# Patient Record
Sex: FEMALE | Race: OTHER | Employment: OTHER | URBAN - METROPOLITAN AREA
[De-identification: names, ages, dates, MRNs, and addresses within clinical notes are randomized per-mention and may not be internally consistent; named-entity substitution may affect disease eponyms.]

---

## 2021-06-23 ENCOUNTER — ULTRASOUND ENCOUNTER (OUTPATIENT)
Dept: OBGYN CLINIC | Facility: CLINIC | Age: 41
End: 2021-06-23
Payer: COMMERCIAL

## 2021-06-23 ENCOUNTER — MOBILE ENCOUNTER (OUTPATIENT)
Dept: OBGYN CLINIC | Facility: CLINIC | Age: 41
End: 2021-06-23

## 2021-06-23 ENCOUNTER — OFFICE VISIT (OUTPATIENT)
Dept: OBGYN CLINIC | Facility: CLINIC | Age: 41
End: 2021-06-23
Payer: COMMERCIAL

## 2021-06-23 VITALS
HEIGHT: 61 IN | BODY MASS INDEX: 33.99 KG/M2 | SYSTOLIC BLOOD PRESSURE: 120 MMHG | DIASTOLIC BLOOD PRESSURE: 80 MMHG | WEIGHT: 180 LBS

## 2021-06-23 DIAGNOSIS — O09.519 ADVANCED MATERNAL AGE, PRIMIGRAVIDA, ANTEPARTUM: ICD-10-CM

## 2021-06-23 DIAGNOSIS — Z34.00 PRIMIPAROUS, ANTEPARTUM: ICD-10-CM

## 2021-06-23 DIAGNOSIS — Z32.00 ENCOUNTER FOR CONFIRMATION OF PREGNANCY TEST RESULT WITH PHYSICAL EXAMINATION: ICD-10-CM

## 2021-06-23 DIAGNOSIS — Z32.00 ENCOUNTER FOR CONFIRMATION OF PREGNANCY TEST RESULT WITH PHYSICAL EXAMINATION: Primary | ICD-10-CM

## 2021-06-23 DIAGNOSIS — O99.210 OBESITY IN PREGNANCY: ICD-10-CM

## 2021-06-23 PROBLEM — E78.6 ELEVATED CHOLESTEROL/HIGH DENSITY LIPOPROTEIN RATIO: Status: ACTIVE | Noted: 2018-09-17

## 2021-06-23 PROCEDURE — 3079F DIAST BP 80-89 MM HG: CPT | Performed by: OBSTETRICS & GYNECOLOGY

## 2021-06-23 PROCEDURE — 3008F BODY MASS INDEX DOCD: CPT | Performed by: OBSTETRICS & GYNECOLOGY

## 2021-06-23 PROCEDURE — 99204 OFFICE O/P NEW MOD 45 MIN: CPT | Performed by: OBSTETRICS & GYNECOLOGY

## 2021-06-23 PROCEDURE — 81025 URINE PREGNANCY TEST: CPT | Performed by: OBSTETRICS & GYNECOLOGY

## 2021-06-23 PROCEDURE — 76817 TRANSVAGINAL US OBSTETRIC: CPT | Performed by: OBSTETRICS & GYNECOLOGY

## 2021-06-23 PROCEDURE — 3074F SYST BP LT 130 MM HG: CPT | Performed by: OBSTETRICS & GYNECOLOGY

## 2021-06-23 RX ORDER — PRENATAL VIT/IRON FUM/FOLIC AC 27MG-0.8MG
1 TABLET ORAL DAILY
COMMUNITY
Start: 2021-03-29 | End: 2022-02-04

## 2021-06-23 RX ORDER — FLUNISOLIDE 0.25 MG/ML
2 SOLUTION NASAL 2 TIMES DAILY
COMMUNITY
Start: 2021-03-29 | End: 2022-02-04

## 2021-06-23 RX ORDER — MONTELUKAST SODIUM 10 MG/1
TABLET ORAL
COMMUNITY
Start: 2018-09-25 | End: 2021-07-26

## 2021-06-23 RX ORDER — FLUTICASONE PROPIONATE AND SALMETEROL 250; 50 UG/1; UG/1
1 POWDER RESPIRATORY (INHALATION) DAILY PRN
COMMUNITY
Start: 2020-04-24 | End: 2021-07-26 | Stop reason: ALTCHOICE

## 2021-06-23 RX ORDER — ALBUTEROL SULFATE 90 UG/1
AEROSOL, METERED RESPIRATORY (INHALATION)
COMMUNITY
Start: 2020-04-01

## 2021-06-23 NOTE — PROGRESS NOTES
OFFICE VISIT FOR CONFIRMATION OF PREGNANCY  NEW PT    2021  3:02 PM    CC: Patient is here for confirmation of pregnancy.     HPI: Patient is a 46year old  Patient's last menstrual period was 2021. 7w0d Estimated Date of Delivery: 22 who History    Socioeconomic History      Marital status:       Spouse name: Not on file      Number of children: Not on file      Years of education: Not on file      Highest education level: Not on file    Occupational History      Occupation: [employer] Abused:     Medications reviewed.  See active list.     /80   Ht 61\"   Wt 180 lb (81.6 kg)   LMP 05/05/2021   Breastfeeding No   BMI 34.01 kg/m²     Exam:   GENERAL: well developed, well nourished, in no apparent distress  ABDOMEN: Soft, non distende related risk of trisomies and option of cell free DNA testing. 13.) The option of complete carrier screening for autosomal recessive disease, cystic fibrosis screening, and SMA screening. New OB first trimester folder given to pt. Plan FU in 4 weeks.

## 2021-06-23 NOTE — PROGRESS NOTES
Telephone call:    Patient paged with \"questions on which meds to get\". Called patient twice. Left voicemail that I was calling to get a hold of the patient. Patient did not answer the phone. Left voicemail to page back if has further questions.     D

## 2021-07-09 ENCOUNTER — NURSE ONLY (OUTPATIENT)
Dept: OBGYN CLINIC | Facility: CLINIC | Age: 41
End: 2021-07-09
Payer: COMMERCIAL

## 2021-07-09 ENCOUNTER — LABORATORY ENCOUNTER (OUTPATIENT)
Dept: LAB | Facility: REFERENCE LAB | Age: 41
End: 2021-07-09
Attending: OBSTETRICS & GYNECOLOGY
Payer: COMMERCIAL

## 2021-07-09 DIAGNOSIS — O99.210 OBESITY IN PREGNANCY: ICD-10-CM

## 2021-07-09 DIAGNOSIS — Z34.01 ENCOUNTER FOR SUPERVISION OF NORMAL FIRST PREGNANCY IN FIRST TRIMESTER: Primary | ICD-10-CM

## 2021-07-09 DIAGNOSIS — Z34.01 ENCOUNTER FOR SUPERVISION OF NORMAL FIRST PREGNANCY IN FIRST TRIMESTER: ICD-10-CM

## 2021-07-09 LAB
ANTIBODY SCREEN: NEGATIVE
BASOPHILS # BLD AUTO: 0.05 X10(3) UL (ref 0–0.2)
BASOPHILS NFR BLD AUTO: 0.4 %
DEPRECATED RDW RBC AUTO: 49.6 FL (ref 35.1–46.3)
EOSINOPHIL # BLD AUTO: 0.02 X10(3) UL (ref 0–0.7)
EOSINOPHIL NFR BLD AUTO: 0.2 %
ERYTHROCYTE [DISTWIDTH] IN BLOOD BY AUTOMATED COUNT: 14.6 % (ref 11–15)
GLUCOSE 1H P GLC SERPL-MCNC: 140 MG/DL
HBV SURFACE AG SER-ACNC: <0.1 [IU]/L
HBV SURFACE AG SERPL QL IA: NONREACTIVE
HCT VFR BLD AUTO: 40.7 %
HGB BLD-MCNC: 13.2 G/DL
IMM GRANULOCYTES # BLD AUTO: 0.04 X10(3) UL (ref 0–1)
IMM GRANULOCYTES NFR BLD: 0.4 %
LYMPHOCYTES # BLD AUTO: 1.93 X10(3) UL (ref 1–4)
LYMPHOCYTES NFR BLD AUTO: 17.4 %
MCH RBC QN AUTO: 30 PG (ref 26–34)
MCHC RBC AUTO-ENTMCNC: 32.4 G/DL (ref 31–37)
MCV RBC AUTO: 92.5 FL
MONOCYTES # BLD AUTO: 0.61 X10(3) UL (ref 0.1–1)
MONOCYTES NFR BLD AUTO: 5.5 %
NEUTROPHILS # BLD AUTO: 8.47 X10 (3) UL (ref 1.5–7.7)
NEUTROPHILS # BLD AUTO: 8.47 X10(3) UL (ref 1.5–7.7)
NEUTROPHILS NFR BLD AUTO: 76.1 %
PLATELET # BLD AUTO: 258 10(3)UL (ref 150–450)
RBC # BLD AUTO: 4.4 X10(6)UL
RH BLOOD TYPE: NEGATIVE
RUBV IGG SER QL: POSITIVE
RUBV IGG SER-ACNC: 88.2 IU/ML (ref 10–?)
WBC # BLD AUTO: 11.1 X10(3) UL (ref 4–11)

## 2021-07-09 PROCEDURE — 86777 TOXOPLASMA ANTIBODY: CPT

## 2021-07-09 PROCEDURE — 86762 RUBELLA ANTIBODY: CPT

## 2021-07-09 PROCEDURE — 82950 GLUCOSE TEST: CPT

## 2021-07-09 PROCEDURE — 85025 COMPLETE CBC W/AUTO DIFF WBC: CPT

## 2021-07-09 PROCEDURE — 87389 HIV-1 AG W/HIV-1&-2 AB AG IA: CPT

## 2021-07-09 PROCEDURE — 86780 TREPONEMA PALLIDUM: CPT

## 2021-07-09 PROCEDURE — 86900 BLOOD TYPING SEROLOGIC ABO: CPT

## 2021-07-09 PROCEDURE — 87340 HEPATITIS B SURFACE AG IA: CPT

## 2021-07-09 PROCEDURE — 86778 TOXOPLASMA ANTIBODY IGM: CPT

## 2021-07-09 PROCEDURE — 86901 BLOOD TYPING SEROLOGIC RH(D): CPT

## 2021-07-09 PROCEDURE — 87086 URINE CULTURE/COLONY COUNT: CPT

## 2021-07-09 PROCEDURE — 86850 RBC ANTIBODY SCREEN: CPT

## 2021-07-09 PROCEDURE — 36415 COLL VENOUS BLD VENIPUNCTURE: CPT

## 2021-07-09 NOTE — PROGRESS NOTES
Pt is a   here today for RN [employer].      Pregnancy Confirmation apt with: [address] [facility] on 21  LMP: 21  US: 21  Working CARMEN: 22 based on LMP    Pre  BMI:   34.03  Medical Hx significant for: asthma, anxiety  Obstetrical Hx signific

## 2021-07-10 PROBLEM — R73.09 ELEVATED GLUCOSE TOLERANCE TEST: Status: ACTIVE | Noted: 2021-07-10

## 2021-07-10 PROBLEM — O99.210 OBESITY IN PREGNANCY: Chronic | Status: ACTIVE | Noted: 2021-06-23

## 2021-07-10 PROBLEM — R73.09 ELEVATED GLUCOSE TOLERANCE TEST: Chronic | Status: ACTIVE | Noted: 2021-07-10

## 2021-07-10 PROBLEM — O26.899 RH NEGATIVE STATE IN ANTEPARTUM PERIOD: Chronic | Status: ACTIVE | Noted: 2021-07-10

## 2021-07-10 PROBLEM — Z67.91 RH NEGATIVE STATE IN ANTEPARTUM PERIOD: Chronic | Status: ACTIVE | Noted: 2021-07-10

## 2021-07-10 PROBLEM — Z67.91 RH NEGATIVE STATE IN ANTEPARTUM PERIOD: Status: ACTIVE | Noted: 2021-07-10

## 2021-07-10 PROBLEM — O99.210 OBESITY IN PREGNANCY (HCC): Chronic | Status: ACTIVE | Noted: 2021-06-23

## 2021-07-10 PROBLEM — O26.899 RH NEGATIVE STATE IN ANTEPARTUM PERIOD: Status: ACTIVE | Noted: 2021-07-10

## 2021-07-10 PROBLEM — Z67.91 RH NEGATIVE STATE IN ANTEPARTUM PERIOD (HCC): Status: ACTIVE | Noted: 2021-07-10

## 2021-07-10 PROBLEM — O26.899 RH NEGATIVE STATE IN ANTEPARTUM PERIOD (HCC): Chronic | Status: ACTIVE | Noted: 2021-07-10

## 2021-07-10 PROBLEM — Z67.91 RH NEGATIVE STATE IN ANTEPARTUM PERIOD (HCC): Chronic | Status: ACTIVE | Noted: 2021-07-10

## 2021-07-10 PROBLEM — O26.899 RH NEGATIVE STATE IN ANTEPARTUM PERIOD (HCC): Status: ACTIVE | Noted: 2021-07-10

## 2021-07-12 LAB — T PALLIDUM AB SER QL: NEGATIVE

## 2021-07-12 NOTE — PROGRESS NOTES
This RN called pt; 3 hr GTT instructions sent to pt via [employer] and number for central scheduling provided. Pt verbalized understanding and agrees with POC.

## 2021-07-13 LAB
TOXOPLASMA GONDII AB, IGG: <3 IU/ML
TOXOPLASMA GONDII AB, IGM: <3 AU/ML

## 2021-07-26 ENCOUNTER — OFFICE VISIT (OUTPATIENT)
Dept: FAMILY MEDICINE CLINIC | Facility: CLINIC | Age: 41
End: 2021-07-26
Payer: COMMERCIAL

## 2021-07-26 VITALS
OXYGEN SATURATION: 99 % | SYSTOLIC BLOOD PRESSURE: 118 MMHG | HEART RATE: 101 BPM | WEIGHT: 181 LBS | HEIGHT: 61 IN | DIASTOLIC BLOOD PRESSURE: 76 MMHG | BODY MASS INDEX: 34.17 KG/M2

## 2021-07-26 DIAGNOSIS — Z00.00 ENCOUNTER FOR ROUTINE ADULT HEALTH EXAMINATION WITHOUT ABNORMAL FINDINGS: Primary | ICD-10-CM

## 2021-07-26 DIAGNOSIS — J45.20 MILD INTERMITTENT ASTHMA WITHOUT COMPLICATION: ICD-10-CM

## 2021-07-26 DIAGNOSIS — Z12.4 PAP SMEAR FOR CERVICAL CANCER SCREENING: ICD-10-CM

## 2021-07-26 PROCEDURE — 3078F DIAST BP <80 MM HG: CPT | Performed by: FAMILY MEDICINE

## 2021-07-26 PROCEDURE — 87624 HPV HI-RISK TYP POOLED RSLT: CPT | Performed by: FAMILY MEDICINE

## 2021-07-26 PROCEDURE — 3074F SYST BP LT 130 MM HG: CPT | Performed by: FAMILY MEDICINE

## 2021-07-26 PROCEDURE — 3008F BODY MASS INDEX DOCD: CPT | Performed by: FAMILY MEDICINE

## 2021-07-26 PROCEDURE — 99385 PREV VISIT NEW AGE 18-39: CPT | Performed by: FAMILY MEDICINE

## 2021-07-26 RX ORDER — OMEGA-3 FATTY ACIDS/FISH OIL 300-1000MG
CAPSULE ORAL
COMMUNITY
End: 2022-02-04

## 2021-07-26 RX ORDER — BUDESONIDE 90 UG/1
1 AEROSOL, POWDER RESPIRATORY (INHALATION) 2 TIMES DAILY
Qty: 1 EACH | Refills: 2 | Status: SHIPPED | OUTPATIENT
Start: 2021-07-26 | End: 2022-02-04

## 2021-07-26 RX ORDER — MONTELUKAST SODIUM 10 MG/1
10 TABLET ORAL NIGHTLY
Qty: 90 TABLET | Refills: 2 | Status: SHIPPED | OUTPATIENT
Start: 2021-07-26 | End: 2022-02-04

## 2021-07-26 NOTE — PROGRESS NOTES
HPI:   Josef Clark is a 46year old female who presents for a complete physical exam.     Currently 12 weeks pregnant and has had nausea and vomiting every night but not during the day. Has tried Vitamin B6 and Unisom with some relief, but not always. mouth nightly. 90 tablet 2   • Budesonide (PULMICORT FLEXHALER) 90 MCG/ACT Inhalation Aerosol Powder, Breath Activated Inhale 1 puff into the lungs 2 (two) times daily. 1 each 2   • Prenatal 27-0.8 MG Oral Tab Take 1 tablet by mouth daily.      • Flunisolid 101   Ht 5' 1\" (1.549 m)   Wt 181 lb (82.1 kg)   LMP 05/05/2021   SpO2 99%   BMI 34.20 kg/m²     GENERAL: well developed, well nourished, in no apparent distress   SKIN: no rashes, no suspicious lesions  HEENT: atraumatic, normocephalic  EYES: PERRLA, EOM vaccine  -Need for Tdap once as an adult and Td booster every 10 years  -Need for Zoster vaccine for patients >= 50  -STI screening (GC/Chlamydia/HIV):  Checked and negative   -Hepatitis C screening for all adults between the ages of 25 and 78: Check in the

## 2021-07-27 ENCOUNTER — TELEPHONE (OUTPATIENT)
Dept: OBGYN CLINIC | Facility: CLINIC | Age: 41
End: 2021-07-27

## 2021-07-27 LAB — HPV I/H RISK 1 DNA SPEC QL NAA+PROBE: NEGATIVE

## 2021-07-30 LAB
LAST PAP RESULT: NORMAL
PAP HISTORY (OTHER THAN LAST PAP): NORMAL

## 2021-08-02 ENCOUNTER — PATIENT MESSAGE (OUTPATIENT)
Dept: OBGYN CLINIC | Facility: CLINIC | Age: 41
End: 2021-08-02

## 2021-08-02 ENCOUNTER — LABORATORY ENCOUNTER (OUTPATIENT)
Dept: LAB | Age: 41
End: 2021-08-02
Attending: OBSTETRICS & GYNECOLOGY
Payer: COMMERCIAL

## 2021-08-02 DIAGNOSIS — R73.09 ELEVATED GLUCOSE TOLERANCE TEST: Primary | ICD-10-CM

## 2021-08-02 PROBLEM — O24.119 PRE-EXISTING TYPE 2 DIABETES MELLITUS DURING PREGNANCY, ANTEPARTUM (HCC): Status: ACTIVE | Noted: 2021-08-02

## 2021-08-02 PROBLEM — O24.119 PRE-EXISTING TYPE 2 DIABETES MELLITUS DURING PREGNANCY, ANTEPARTUM: Status: ACTIVE | Noted: 2021-08-02

## 2021-08-02 PROCEDURE — 82952 GTT-ADDED SAMPLES: CPT | Performed by: OBSTETRICS & GYNECOLOGY

## 2021-08-02 PROCEDURE — 36415 COLL VENOUS BLD VENIPUNCTURE: CPT | Performed by: OBSTETRICS & GYNECOLOGY

## 2021-08-02 PROCEDURE — 82951 GLUCOSE TOLERANCE TEST (GTT): CPT | Performed by: OBSTETRICS & GYNECOLOGY

## 2021-08-03 ENCOUNTER — INITIAL PRENATAL (OUTPATIENT)
Dept: OBGYN CLINIC | Facility: CLINIC | Age: 41
End: 2021-08-03
Payer: COMMERCIAL

## 2021-08-03 VITALS
DIASTOLIC BLOOD PRESSURE: 76 MMHG | BODY MASS INDEX: 34.36 KG/M2 | HEIGHT: 61 IN | SYSTOLIC BLOOD PRESSURE: 112 MMHG | WEIGHT: 182 LBS

## 2021-08-03 DIAGNOSIS — Z67.91 RH NEGATIVE STATE IN ANTEPARTUM PERIOD: Chronic | ICD-10-CM

## 2021-08-03 DIAGNOSIS — O24.119 PRE-EXISTING TYPE 2 DIABETES MELLITUS DURING PREGNANCY, ANTEPARTUM: Chronic | ICD-10-CM

## 2021-08-03 DIAGNOSIS — O26.899 RH NEGATIVE STATE IN ANTEPARTUM PERIOD: Chronic | ICD-10-CM

## 2021-08-03 DIAGNOSIS — O09.519 ADVANCED MATERNAL AGE, PRIMIGRAVIDA, ANTEPARTUM: Primary | Chronic | ICD-10-CM

## 2021-08-03 PROCEDURE — 3078F DIAST BP <80 MM HG: CPT | Performed by: OBSTETRICS & GYNECOLOGY

## 2021-08-03 PROCEDURE — 3008F BODY MASS INDEX DOCD: CPT | Performed by: OBSTETRICS & GYNECOLOGY

## 2021-08-03 PROCEDURE — 3074F SYST BP LT 130 MM HG: CPT | Performed by: OBSTETRICS & GYNECOLOGY

## 2021-08-03 PROCEDURE — 99212 OFFICE O/P EST SF 10 MIN: CPT | Performed by: OBSTETRICS & GYNECOLOGY

## 2021-08-06 ENCOUNTER — HOSPITAL ENCOUNTER (OUTPATIENT)
Dept: ENDOCRINOLOGY | Facility: HOSPITAL | Age: 41
Discharge: HOME | End: 2021-08-06
Attending: OBSTETRICS & GYNECOLOGY
Payer: COMMERCIAL

## 2021-08-06 VITALS — BODY MASS INDEX: 34 KG/M2 | WEIGHT: 179.88 LBS

## 2021-08-06 DIAGNOSIS — O24.415 GESTATIONAL DIABETES MELLITUS (GDM) IN SECOND TRIMESTER CONTROLLED ON ORAL HYPOGLYCEMIC DRUG: Primary | ICD-10-CM

## 2021-08-06 NOTE — PROGRESS NOTES
Gumarogolden Tayler  : 3/3/1980 was seen for Gestational Diabetes Counseling: Individual 14 weeks geatation    Date: 2021   Start time: 3:45 End time: 4:45    Obtained usual diet history: nausea has been very strong throughout pregnancy   B: saltines, provided. Recommendations:      1. Follow recommended GDM meal plan. 2. Begin testing fasting glucose and 2 hours after meals   3. Bring glucose log to each MD office visit. 4. Encouraged activity if no restrictions.    5. Encouraged Malick Baker to call

## 2021-08-17 ENCOUNTER — PATIENT MESSAGE (OUTPATIENT)
Dept: OBGYN CLINIC | Facility: CLINIC | Age: 41
End: 2021-08-17

## 2021-08-17 NOTE — TELEPHONE ENCOUNTER
Fletcher Walters,      How is the nausea and vomiting today? If you are still having issues, please call the office for medication to treat nausea and vomiting. As for the iron pills, based upon your labs you are not anemic and do not need to take iron.   A

## 2021-08-18 ENCOUNTER — APPOINTMENT (OUTPATIENT)
Dept: ENDOCRINOLOGY | Facility: HOSPITAL | Age: 41
End: 2021-08-18
Attending: OBSTETRICS & GYNECOLOGY
Payer: COMMERCIAL

## 2021-08-19 ENCOUNTER — HOSPITAL ENCOUNTER (OUTPATIENT)
Dept: ENDOCRINOLOGY | Facility: HOSPITAL | Age: 41
Discharge: HOME | End: 2021-08-19
Attending: OBSTETRICS & GYNECOLOGY
Payer: COMMERCIAL

## 2021-08-19 VITALS — WEIGHT: 179 LBS | BODY MASS INDEX: 34 KG/M2

## 2021-08-19 DIAGNOSIS — O24.410 DIET CONTROLLED GESTATIONAL DIABETES MELLITUS (GDM) IN SECOND TRIMESTER: Primary | ICD-10-CM

## 2021-08-19 NOTE — PROGRESS NOTES
Terrie Alfaro  : 3/3/1980 was seen for GDM Follow-Up Counseling 15 weeks     Date: 2021  Referring Provider: Dr Danny Lord  Start time: 5 pm End time: 5:30 pm    Assessment:  Wt 179 lb   LMP 2021   BMI 33.82 kg/m²   Weight: Wt Readings from and when to call provider. Recommendations:      1. Follow recommended meal plan. 2. Test blood glucose and ketones as directed. 3. Bring glucose log to each MD visit. 4. Start/continue activity if no restrictions.     5. Additional recommendation

## 2021-08-20 ENCOUNTER — PATIENT MESSAGE (OUTPATIENT)
Dept: OBGYN CLINIC | Facility: CLINIC | Age: 41
End: 2021-08-20

## 2021-08-20 NOTE — TELEPHONE ENCOUNTER
From: Chris Olvera  To: Alma Delia Rivera MD  Sent: 8/20/2021 9:08 AM CDT  Subject: Non-Urgent Medical Question    Good morning. I just read the after visit notes from Dr. Kennedy Hernandez.  She has that my gestational diabetes is being controlled by an or

## 2021-08-27 ENCOUNTER — PATIENT MESSAGE (OUTPATIENT)
Dept: OBGYN CLINIC | Facility: CLINIC | Age: 41
End: 2021-08-27

## 2021-08-27 DIAGNOSIS — O24.319 PREGESTATIONAL DIABETES MELLITUS, MODIFIED WHITE CLASS B: Primary | ICD-10-CM

## 2021-08-30 ENCOUNTER — TELEPHONE (OUTPATIENT)
Dept: PERINATAL CARE | Facility: HOSPITAL | Age: 41
End: 2021-08-30

## 2021-08-30 ENCOUNTER — PATIENT MESSAGE (OUTPATIENT)
Dept: OBGYN CLINIC | Facility: CLINIC | Age: 41
End: 2021-08-30

## 2021-08-30 ENCOUNTER — ROUTINE PRENATAL (OUTPATIENT)
Dept: OBGYN CLINIC | Facility: CLINIC | Age: 41
End: 2021-08-30
Payer: COMMERCIAL

## 2021-08-30 VITALS
BODY MASS INDEX: 33.58 KG/M2 | SYSTOLIC BLOOD PRESSURE: 114 MMHG | HEIGHT: 61 IN | DIASTOLIC BLOOD PRESSURE: 70 MMHG | WEIGHT: 177.88 LBS

## 2021-08-30 DIAGNOSIS — O24.119 PRE-EXISTING TYPE 2 DIABETES MELLITUS DURING PREGNANCY, ANTEPARTUM: Primary | Chronic | ICD-10-CM

## 2021-08-30 DIAGNOSIS — O24.119 TYPE 2 DIABETES MELLITUS COMPLICATING PREGNANCY, ANTEPARTUM: Primary | ICD-10-CM

## 2021-08-30 DIAGNOSIS — O09.519 ADVANCED MATERNAL AGE, PRIMIGRAVIDA, ANTEPARTUM: Chronic | ICD-10-CM

## 2021-08-30 LAB
GLUCOSE (URINE DIPSTICK): NEGATIVE MG/DL
MULTISTIX LOT#: NORMAL NUMERIC
PROTEIN (URINE DIPSTICK): NEGATIVE MG/DL

## 2021-08-30 PROCEDURE — 81002 URINALYSIS NONAUTO W/O SCOPE: CPT | Performed by: OBSTETRICS & GYNECOLOGY

## 2021-08-30 PROCEDURE — 99213 OFFICE O/P EST LOW 20 MIN: CPT | Performed by: OBSTETRICS & GYNECOLOGY

## 2021-08-30 PROCEDURE — 3078F DIAST BP <80 MM HG: CPT | Performed by: OBSTETRICS & GYNECOLOGY

## 2021-08-30 PROCEDURE — 3008F BODY MASS INDEX DOCD: CPT | Performed by: OBSTETRICS & GYNECOLOGY

## 2021-08-30 PROCEDURE — 3074F SYST BP LT 130 MM HG: CPT | Performed by: OBSTETRICS & GYNECOLOGY

## 2021-08-30 NOTE — TELEPHONE ENCOUNTER
See other my chart message            From: Kimberly Gamino  To: Zachery Persaud MD  Sent: 8/30/2021  2:10 PM CDT  Subject: Referral Request    Hi.  I need a referral put in for my 20 week ultrasound as they are already booked until the end of S

## 2021-08-30 NOTE — PROGRESS NOTES
Here for WANDA - last visit Dr. Jim Friedman. Just began Metformin 8/27 for elevated FBS - consult with MFM and for Level II USN ordered.

## 2021-08-31 ENCOUNTER — HOSPITAL ENCOUNTER (OUTPATIENT)
Dept: PERINATAL CARE | Facility: HOSPITAL | Age: 41
Discharge: HOME | End: 2021-08-31
Attending: OBSTETRICS & GYNECOLOGY
Payer: COMMERCIAL

## 2021-08-31 ENCOUNTER — TELEPHONE (OUTPATIENT)
Dept: PERINATAL CARE | Facility: HOSPITAL | Age: 41
End: 2021-08-31

## 2021-08-31 VITALS — WEIGHT: 177 LBS | BODY MASS INDEX: 33 KG/M2

## 2021-08-31 DIAGNOSIS — O24.319 PREGESTATIONAL DIABETES MELLITUS, MODIFIED WHITE CLASS B: Primary | ICD-10-CM

## 2021-08-31 DIAGNOSIS — O09.519 ADVANCED MATERNAL AGE, PRIMIGRAVIDA, ANTEPARTUM: Chronic | ICD-10-CM

## 2021-08-31 DIAGNOSIS — O99.210 OBESITY IN PREGNANCY: Chronic | ICD-10-CM

## 2021-08-31 DIAGNOSIS — O24.319 PREGESTATIONAL DIABETES MELLITUS, MODIFIED WHITE CLASS B: ICD-10-CM

## 2021-08-31 PROCEDURE — 99243 OFF/OP CNSLTJ NEW/EST LOW 30: CPT | Performed by: OBSTETRICS & GYNECOLOGY

## 2021-08-31 RX ORDER — PEN NEEDLE, DIABETIC 31 GX5/16"
NEEDLE, DISPOSABLE MISCELLANEOUS
Qty: 100 EACH | Refills: 2 | Status: SHIPPED | OUTPATIENT
Start: 2021-08-31 | End: 2021-11-18

## 2021-08-31 NOTE — PROGRESS NOTES
Reason for Consult:   Dear Dr. Alease Boxer ,    Thank you for requesting a maternal fetal medicine consultation on Stacey Hill. As you are aware she is a 46year old female with a [place] pregnancy at [place] [address].   A maternal-fetal medicine co route 2 (two) times daily.      • Albuterol Sulfate  (90 Base) MCG/ACT Inhalation Aero Soln ALBUTEROL SULFATE  (90 BASE) MCG/ACT AERS          HISTORY:  Past Medical History:   Diagnosis Date   • Anxiety    • Asthma    • Environmental allergie advised for these patients including a comprehensive ultrasound to assess for fetal malformations (at 20 weeks) and a third trimester ultrasound assessment for fetal growth (at 32 weeks).  In addition, weekly NST's (initiating at 36 weeks gestation for wome women does increase the chance that a women will be induced (71 inductions per fetal death averted) and thereby increases her risk of having a  delivery, only 14 additional cesareans would need to be performed to avert one unexplained fetal death. with the patient. First trimester screening and second trimester multiple-marker serum serum screening as alternative aneuploidy screening options were also reviewed.  However, both of these tests are associated with lower detection and higher false positiv postptandial values of less than 140, or 2 hour postprandial values of less than 120.   Blood sugars should be check at least 4 times daily.      ----------------------------    OBESITY:    Obesity during pregnancy is associated with numerous maternal and p risk factors for preeclampsia.              Studies have found that the increased risk of  birth in obese gravidas is primarily associated with obesity-related medical and  complications, rather than an intrinsic predisposition to spontaneou daily    • Delivery for well controlled pre-existing diabetes without other complication 39 weeks  • Delivery for poorly controlled pre-existing diabetes with other complications 22-85 weeks.     Thank you for allowing me to participate in the care of your

## 2021-08-31 NOTE — PROGRESS NOTES
Pt for MFM consult and Diabetes management  Per Dr Frederic Bamberger she would like Pt managed for DM by MFM  My chart  Flow sheet hector initiated Pt states understanding

## 2021-08-31 NOTE — TELEPHONE ENCOUNTER
Informed LC of pt's concern, order given for NPH 10 units nightly, education at the diabetic center at [facility] 150, f/u with MFM 09/15/21. Called diabetic center and informed on placing the order in the referral and done.     Called pt and informed of insulin ED

## 2021-08-31 NOTE — TELEPHONE ENCOUNTER
Pt contacted office RE Diabetic consult  Pt states taking metformin 500 bid since 8/20  Unable to achieve [facility] Under 100  Office contacted RE if MFM is to follow Blood glucose for pt  Pt encouraged to get A1C  Testing done    Pt offered appointment 10 am  8

## 2021-09-02 ENCOUNTER — TELEPHONE (OUTPATIENT)
Dept: OBGYN CLINIC | Facility: CLINIC | Age: 41
End: 2021-09-02

## 2021-09-02 NOTE — TELEPHONE ENCOUNTER
Patient is very disappointed in the care she is receiving at Dr Teressa Kat office and would like to transfer care to our practice.   She requested that they send her medical records over for review but they refused and told her Dr Denton can access them thro

## 2021-09-02 NOTE — TELEPHONE ENCOUNTER
Spoke with pt who is 17w1d and unhappy with care at current practice. She wishes to transfer care to us. She is under 20 weeks so no doc approval needed. We can see all labs from current practice in EPIC.  Pt states she just failed her 3 hour GTT and starte

## 2021-09-03 ENCOUNTER — HOSPITAL ENCOUNTER (OUTPATIENT)
Dept: ENDOCRINOLOGY | Facility: HOSPITAL | Age: 41
Discharge: HOME | End: 2021-09-03
Attending: OBSTETRICS & GYNECOLOGY
Payer: COMMERCIAL

## 2021-09-03 VITALS — BODY MASS INDEX: 33 KG/M2 | WEIGHT: 177 LBS

## 2021-09-03 DIAGNOSIS — O24.414 INSULIN CONTROLLED GESTATIONAL DIABETES MELLITUS (GDM) IN SECOND TRIMESTER: Primary | ICD-10-CM

## 2021-09-03 NOTE — PROGRESS NOTES
Jose Zhang  : 3/3/1980 was seen for Injection Instruction: Individual    Date: 9/3/2021 Start time: 2:30 pm End time: 3:08 pm    Wt 177 lb   LMP 2021   BMI 33.44 kg/m²       Medication type, dose and frequency: Patient and  reviewed p

## 2021-09-07 ENCOUNTER — TELEPHONE (OUTPATIENT)
Dept: PERINATAL CARE | Facility: HOSPITAL | Age: 41
End: 2021-09-07

## 2021-09-07 NOTE — TELEPHONE ENCOUNTER
17w6d  Received BS log for date range 9/1- 9/6     Low  High Out of Range   Fasting Blood Sugar 92 99 4/6   Post Breakfast 72 115 0/6   Post lunch 101 124 1/6   Post Dinner 106 132 2/6     Patient is currently taking        Levemir 10u at HS

## 2021-09-07 NOTE — TELEPHONE ENCOUNTER
Blood Glucose flow sheet  reviewed  through 9/6        Recommendations  Tor 20 Units at     Blood glucose monitoring   With weekly review by AMIRA

## 2021-09-14 ENCOUNTER — TELEPHONE (OUTPATIENT)
Dept: PERINATAL CARE | Facility: HOSPITAL | Age: 41
End: 2021-09-14

## 2021-09-14 NOTE — TELEPHONE ENCOUNTER
18w6d  Received BS log for date range 9/7-9/13     Low  High Out of Range   Fasting Blood Sugar 77 95 0/7   Post Breakfast 94 109 0/7   Post lunch 96 129 3/7 (food choices for 2/3 highs)   Post Dinner 100 122 1/7     Patient is currently taking     Levemir

## 2021-09-14 NOTE — TELEPHONE ENCOUNTER
No change - continue current regimen. Continue Insulin  Levemir 20 units at bedtime     Sharyle Casey. Sabine Carpenter M.D.   Maternal-Fetal Medicine

## 2021-09-20 ENCOUNTER — NURSE ONLY (OUTPATIENT)
Dept: OBGYN CLINIC | Facility: CLINIC | Age: 41
End: 2021-09-20
Payer: COMMERCIAL

## 2021-09-20 VITALS — BODY MASS INDEX: 33 KG/M2 | WEIGHT: 175.38 LBS

## 2021-09-20 DIAGNOSIS — Z34.02 ENCOUNTER FOR SUPERVISION OF NORMAL FIRST PREGNANCY IN SECOND TRIMESTER: Primary | ICD-10-CM

## 2021-09-20 PROBLEM — Z67.91 RH NEGATIVE STATE IN ANTEPARTUM PERIOD (HCC): Chronic | Status: RESOLVED | Noted: 2021-07-10 | Resolved: 2021-09-20

## 2021-09-20 PROBLEM — R73.09 ELEVATED GLUCOSE TOLERANCE TEST: Chronic | Status: RESOLVED | Noted: 2021-07-10 | Resolved: 2021-09-20

## 2021-09-20 PROBLEM — O26.899 RH NEGATIVE STATE IN ANTEPARTUM PERIOD: Chronic | Status: RESOLVED | Noted: 2021-07-10 | Resolved: 2021-09-20

## 2021-09-20 PROBLEM — O26.899 RH NEGATIVE STATE IN ANTEPARTUM PERIOD (HCC): Chronic | Status: RESOLVED | Noted: 2021-07-10 | Resolved: 2021-09-20

## 2021-09-20 PROBLEM — Z67.91 RH NEGATIVE STATE IN ANTEPARTUM PERIOD: Chronic | Status: RESOLVED | Noted: 2021-07-10 | Resolved: 2021-09-20

## 2021-09-20 PROBLEM — O24.119 PRE-EXISTING TYPE 2 DIABETES MELLITUS DURING PREGNANCY, ANTEPARTUM (HCC): Chronic | Status: RESOLVED | Noted: 2021-08-02 | Resolved: 2021-09-20

## 2021-09-20 PROBLEM — O24.119 PRE-EXISTING TYPE 2 DIABETES MELLITUS DURING PREGNANCY, ANTEPARTUM: Chronic | Status: RESOLVED | Noted: 2021-08-02 | Resolved: 2021-09-20

## 2021-09-20 RX ORDER — CHOLECALCIFEROL (VITAMIN D3) 25 MCG
1 TABLET,CHEWABLE ORAL DAILY
COMMUNITY

## 2021-09-20 NOTE — PROGRESS NOTES
Pillo Moreno    Dear Dr. Alberto Crandall    Thank you for requesting an ultrasound and maternal-fetal medicine consultation on your patient Randy Felty.   As you are aware she is a 46year old female  with a singl with the recommended four times daily assessments (fasting and two-hour post-prandial) is adequate. Her overall glucose control is adequate. Her compliance with her insulin regimen was reviewed and it is adequate.    Her current insulin regimen is:  Karina Adorno

## 2021-09-20 NOTE — PROGRESS NOTES
Pt seen for OBN appt today as a transfer at 19w with no complaints. Pt had PN labs done at previous practice and results are in Epic in the labs section. Pt has NPN appt with [facility] on 9/22.      Partner's name is Travis Concha  contact # [phone]; antonella family with:  Patient's age 28 years or older as of estimated date of delivery: Yes   Thalassemia ([facility], [facility], [address] [facility], or  background):  MCV less than 80: No   Neural tube defect (Meningomyelocele, Spina bifida, or Anencephaly): No   Congeni

## 2021-09-21 ENCOUNTER — PATIENT MESSAGE (OUTPATIENT)
Dept: PERINATAL CARE | Facility: HOSPITAL | Age: 41
End: 2021-09-21

## 2021-09-21 NOTE — TELEPHONE ENCOUNTER
Blood Glucose flow sheet  reviewed  through  9/21        Recommendations     Continue  Levemir 20 units at bedtime      Blood glucose monitoring   With weekly review by AMIRA

## 2021-09-22 ENCOUNTER — HOSPITAL ENCOUNTER (OUTPATIENT)
Dept: PERINATAL CARE | Facility: HOSPITAL | Age: 41
Discharge: HOME | End: 2021-09-22
Attending: OBSTETRICS & GYNECOLOGY
Payer: COMMERCIAL

## 2021-09-22 ENCOUNTER — LAB ENCOUNTER (OUTPATIENT)
Dept: LAB | Facility: HOSPITAL | Age: 41
End: 2021-09-22
Attending: OBSTETRICS & GYNECOLOGY
Payer: COMMERCIAL

## 2021-09-22 ENCOUNTER — INITIAL PRENATAL (OUTPATIENT)
Dept: OBGYN CLINIC | Facility: CLINIC | Age: 41
End: 2021-09-22
Payer: COMMERCIAL

## 2021-09-22 ENCOUNTER — TELEPHONE (OUTPATIENT)
Dept: OBGYN CLINIC | Facility: CLINIC | Age: 41
End: 2021-09-22

## 2021-09-22 VITALS
WEIGHT: 175 LBS | DIASTOLIC BLOOD PRESSURE: 68 MMHG | BODY MASS INDEX: 33 KG/M2 | SYSTOLIC BLOOD PRESSURE: 107 MMHG | HEART RATE: 67 BPM

## 2021-09-22 VITALS
BODY MASS INDEX: 33 KG/M2 | WEIGHT: 175 LBS | HEART RATE: 96 BPM | SYSTOLIC BLOOD PRESSURE: 104 MMHG | DIASTOLIC BLOOD PRESSURE: 64 MMHG

## 2021-09-22 DIAGNOSIS — Z34.02 ENCOUNTER FOR SUPERVISION OF NORMAL FIRST PREGNANCY IN SECOND TRIMESTER: ICD-10-CM

## 2021-09-22 DIAGNOSIS — Z34.02 ENCOUNTER FOR SUPERVISION OF NORMAL FIRST PREGNANCY IN SECOND TRIMESTER: Primary | ICD-10-CM

## 2021-09-22 DIAGNOSIS — O99.210 OBESITY IN PREGNANCY: Chronic | ICD-10-CM

## 2021-09-22 DIAGNOSIS — O24.419 GDM, CLASS A2: Primary | ICD-10-CM

## 2021-09-22 DIAGNOSIS — Z36.3 SCREENING, ANTENATAL, FOR MALFORMATION BY ULTRASOUND: ICD-10-CM

## 2021-09-22 DIAGNOSIS — O09.519 ADVANCED MATERNAL AGE, PRIMIGRAVIDA, ANTEPARTUM: Chronic | ICD-10-CM

## 2021-09-22 PROBLEM — O24.319 PREGESTATIONAL DIABETES MELLITUS, MODIFIED WHITE CLASS B: Status: ACTIVE | Noted: 2021-09-22

## 2021-09-22 PROBLEM — O24.319 PREGESTATIONAL DIABETES MELLITUS, MODIFIED WHITE CLASS B (HCC): Status: ACTIVE | Noted: 2021-09-22

## 2021-09-22 LAB
ALT SERPL-CCNC: 20 U/L
APPEARANCE: CLEAR
AST SERPL-CCNC: 11 U/L (ref 15–37)
CREAT UR-SCNC: 74.3 MG/DL
DEPRECATED RDW RBC AUTO: 49.4 FL (ref 35.1–46.3)
ERYTHROCYTE [DISTWIDTH] IN BLOOD BY AUTOMATED COUNT: 14.2 % (ref 11–15)
GLUCOSE (URINE DIPSTICK): NEGATIVE MG/DL
HCT VFR BLD AUTO: 39.3 %
HGB BLD-MCNC: 12.6 G/DL
MCH RBC QN AUTO: 30.4 PG (ref 26–34)
MCHC RBC AUTO-ENTMCNC: 32.1 G/DL (ref 31–37)
MCV RBC AUTO: 94.7 FL
MULTISTIX LOT#: 5077 NUMERIC
NITRITE, URINE: NEGATIVE
PLATELET # BLD AUTO: 254 10(3)UL (ref 150–450)
PROT UR-MCNC: 5.5 MG/DL
PROT/CREAT UR-RTO: 0.07
RBC # BLD AUTO: 4.15 X10(6)UL
URINE-COLOR: YELLOW
WBC # BLD AUTO: 11 X10(3) UL (ref 4–11)

## 2021-09-22 PROCEDURE — 84460 ALANINE AMINO (ALT) (SGPT): CPT | Performed by: OBSTETRICS & GYNECOLOGY

## 2021-09-22 PROCEDURE — 84156 ASSAY OF PROTEIN URINE: CPT

## 2021-09-22 PROCEDURE — 82570 ASSAY OF URINE CREATININE: CPT

## 2021-09-22 PROCEDURE — 99215 OFFICE O/P EST HI 40 MIN: CPT | Performed by: OBSTETRICS & GYNECOLOGY

## 2021-09-22 PROCEDURE — 3074F SYST BP LT 130 MM HG: CPT | Performed by: OBSTETRICS & GYNECOLOGY

## 2021-09-22 PROCEDURE — 81002 URINALYSIS NONAUTO W/O SCOPE: CPT | Performed by: OBSTETRICS & GYNECOLOGY

## 2021-09-22 PROCEDURE — 36415 COLL VENOUS BLD VENIPUNCTURE: CPT

## 2021-09-22 PROCEDURE — 85027 COMPLETE CBC AUTOMATED: CPT

## 2021-09-22 PROCEDURE — 76811 OB US DETAILED SNGL FETUS: CPT | Performed by: OBSTETRICS & GYNECOLOGY

## 2021-09-22 PROCEDURE — 3078F DIAST BP <80 MM HG: CPT | Performed by: OBSTETRICS & GYNECOLOGY

## 2021-09-22 PROCEDURE — 84450 TRANSFERASE (AST) (SGOT): CPT | Performed by: OBSTETRICS & GYNECOLOGY

## 2021-09-22 NOTE — TELEPHONE ENCOUNTER
Patient is in the lab and would like to have her blood work and urine culture done but the labs are not in place yet. Please advise.

## 2021-09-22 NOTE — PROGRESS NOTES
Had MFM US this am- reviewed recs with her- please see prob list, pt on insulin 0+0+0+20L- AMIRA reviews her BS's weekly.   cotest neg 7/2021

## 2021-09-28 ENCOUNTER — TELEPHONE (OUTPATIENT)
Dept: PERINATAL CARE | Facility: HOSPITAL | Age: 41
End: 2021-09-28

## 2021-09-28 NOTE — TELEPHONE ENCOUNTER
Blood Glucose flow sheet  reviewed  through  9/27     Recommendations      Continue  Levemir 20 units at bedtime       Blood glucose monitoring   With weekly review by MFM    My chart message sent

## 2021-09-30 ENCOUNTER — TELEPHONE (OUTPATIENT)
Dept: PERINATAL CARE | Facility: HOSPITAL | Age: 41
End: 2021-09-30

## 2021-10-05 ENCOUNTER — TELEPHONE (OUTPATIENT)
Dept: PERINATAL CARE | Facility: HOSPITAL | Age: 41
End: 2021-10-05

## 2021-10-05 NOTE — TELEPHONE ENCOUNTER
Blood Glucose flow sheet  reviewed  through 10/4      Recommendations    Levemir 20 units at bedtime      Blood glucose monitoring   With weekly review by MFM    My chart message sent

## 2021-10-12 ENCOUNTER — TELEPHONE (OUTPATIENT)
Dept: PERINATAL CARE | Facility: HOSPITAL | Age: 41
End: 2021-10-12

## 2021-10-12 NOTE — TELEPHONE ENCOUNTER
Blood Glucose flow sheet  reviewed  through 10/11      Recommendations     Jewelemir 20 units at bedtime       Blood glucose monitoring   With weekly review by MFM     My chart message sent

## 2021-10-14 ENCOUNTER — LAB ENCOUNTER (OUTPATIENT)
Dept: LAB | Facility: HOSPITAL | Age: 41
End: 2021-10-14
Attending: OBSTETRICS & GYNECOLOGY
Payer: COMMERCIAL

## 2021-10-14 DIAGNOSIS — Z34.02 ENCOUNTER FOR SUPERVISION OF NORMAL FIRST PREGNANCY IN SECOND TRIMESTER: ICD-10-CM

## 2021-10-14 PROCEDURE — 36415 COLL VENOUS BLD VENIPUNCTURE: CPT

## 2021-10-14 PROCEDURE — 83036 HEMOGLOBIN GLYCOSYLATED A1C: CPT

## 2021-10-14 PROCEDURE — 84443 ASSAY THYROID STIM HORMONE: CPT

## 2021-10-19 ENCOUNTER — TELEPHONE (OUTPATIENT)
Dept: PERINATAL CARE | Facility: HOSPITAL | Age: 41
End: 2021-10-19

## 2021-10-19 NOTE — TELEPHONE ENCOUNTER
Blood Glucose flow sheet  reviewed  through 10/18      Recommendations     Levemir 20 units at bedtime       Blood glucose monitoring   With weekly review by MFM     My chart message sent

## 2021-10-21 ENCOUNTER — TELEPHONE (OUTPATIENT)
Dept: OBGYN CLINIC | Facility: CLINIC | Age: 41
End: 2021-10-21

## 2021-10-22 ENCOUNTER — PATIENT MESSAGE (OUTPATIENT)
Dept: OBGYN CLINIC | Facility: CLINIC | Age: 41
End: 2021-10-22

## 2021-10-22 ENCOUNTER — HOSPITAL ENCOUNTER (OUTPATIENT)
Dept: PERINATAL CARE | Facility: HOSPITAL | Age: 41
Discharge: HOME | End: 2021-10-22
Attending: OBSTETRICS & GYNECOLOGY
Payer: COMMERCIAL

## 2021-10-22 ENCOUNTER — LAB ENCOUNTER (OUTPATIENT)
Dept: LAB | Facility: HOSPITAL | Age: 41
End: 2021-10-22
Attending: OBSTETRICS & GYNECOLOGY
Payer: COMMERCIAL

## 2021-10-22 ENCOUNTER — ROUTINE PRENATAL (OUTPATIENT)
Dept: OBGYN CLINIC | Facility: CLINIC | Age: 41
End: 2021-10-22
Payer: COMMERCIAL

## 2021-10-22 VITALS
WEIGHT: 178 LBS | BODY MASS INDEX: 34 KG/M2 | HEART RATE: 88 BPM | DIASTOLIC BLOOD PRESSURE: 67 MMHG | SYSTOLIC BLOOD PRESSURE: 121 MMHG

## 2021-10-22 VITALS
DIASTOLIC BLOOD PRESSURE: 71 MMHG | SYSTOLIC BLOOD PRESSURE: 110 MMHG | BODY MASS INDEX: 34 KG/M2 | HEART RATE: 80 BPM | WEIGHT: 177.38 LBS

## 2021-10-22 DIAGNOSIS — Z34.91 ENCOUNTER FOR SUPERVISION OF NORMAL PREGNANCY IN FIRST TRIMESTER, UNSPECIFIED GRAVIDITY: ICD-10-CM

## 2021-10-22 DIAGNOSIS — O09.519 ADVANCED MATERNAL AGE, PRIMIGRAVIDA, ANTEPARTUM: Primary | Chronic | ICD-10-CM

## 2021-10-22 DIAGNOSIS — O24.119 TYPE 2 DIABETES MELLITUS COMPLICATING PREGNANCY, ANTEPARTUM: ICD-10-CM

## 2021-10-22 DIAGNOSIS — O09.519 ADVANCED MATERNAL AGE, PRIMIGRAVIDA, ANTEPARTUM: Chronic | ICD-10-CM

## 2021-10-22 DIAGNOSIS — Z34.91 ENCOUNTER FOR SUPERVISION OF NORMAL PREGNANCY IN FIRST TRIMESTER, UNSPECIFIED GRAVIDITY: Primary | ICD-10-CM

## 2021-10-22 DIAGNOSIS — O24.319 PREGESTATIONAL DIABETES MELLITUS, MODIFIED WHITE CLASS B: ICD-10-CM

## 2021-10-22 DIAGNOSIS — O99.210 OBESITY IN PREGNANCY: Chronic | ICD-10-CM

## 2021-10-22 PROCEDURE — 93325 DOPPLER ECHO COLOR FLOW MAPG: CPT | Performed by: OBSTETRICS & GYNECOLOGY

## 2021-10-22 PROCEDURE — 93010 ELECTROCARDIOGRAM REPORT: CPT | Performed by: OBSTETRICS & GYNECOLOGY

## 2021-10-22 PROCEDURE — 76825 ECHO EXAM OF FETAL HEART: CPT | Performed by: OBSTETRICS & GYNECOLOGY

## 2021-10-22 PROCEDURE — 99212 OFFICE O/P EST SF 10 MIN: CPT | Performed by: OBSTETRICS & GYNECOLOGY

## 2021-10-22 PROCEDURE — 93005 ELECTROCARDIOGRAM TRACING: CPT

## 2021-10-22 PROCEDURE — 3078F DIAST BP <80 MM HG: CPT | Performed by: OBSTETRICS & GYNECOLOGY

## 2021-10-22 PROCEDURE — 90471 IMMUNIZATION ADMIN: CPT | Performed by: OBSTETRICS & GYNECOLOGY

## 2021-10-22 PROCEDURE — 85027 COMPLETE CBC AUTOMATED: CPT

## 2021-10-22 PROCEDURE — 3074F SYST BP LT 130 MM HG: CPT | Performed by: OBSTETRICS & GYNECOLOGY

## 2021-10-22 PROCEDURE — 36415 COLL VENOUS BLD VENIPUNCTURE: CPT

## 2021-10-22 PROCEDURE — 81002 URINALYSIS NONAUTO W/O SCOPE: CPT | Performed by: OBSTETRICS & GYNECOLOGY

## 2021-10-22 PROCEDURE — 76827 ECHO EXAM OF FETAL HEART: CPT | Performed by: OBSTETRICS & GYNECOLOGY

## 2021-10-22 PROCEDURE — 90686 IIV4 VACC NO PRSV 0.5 ML IM: CPT | Performed by: OBSTETRICS & GYNECOLOGY

## 2021-10-22 NOTE — PROGRESS NOTES
Adam Summers    Dear Dr. De Pink    Thank you for requesting ultrasound evaluation and maternal fetal medicine consultation on your patient Prateek Tinajero.   As you are aware she is a 46year old female  wi There is a right to left shunting across the patent roiana ovale. There is a normal appearance of the aorta and branching pattern of the head vessels. There appears to be a structurally  normal fetal heart and rhythm.  The patient was made aware of the l Recommendation: decrease insulin to 18 levemir. Continued medication use and capillary blood glucose assessments were reviewed as well as recommendations for serial growth ultrasounds and antepartum testing.       IMPRESSION:  · IUP at 24w2d  · Early-o

## 2021-10-22 NOTE — PROGRESS NOTES
Pt given flu vaccine in left deltoid. Pt tolerated well.     Lot 3A7CG  Exp  06-30-22  ndc 10327-768-30

## 2021-10-29 ENCOUNTER — TELEPHONE (OUTPATIENT)
Dept: PERINATAL CARE | Facility: HOSPITAL | Age: 41
End: 2021-10-29

## 2021-10-29 NOTE — TELEPHONE ENCOUNTER
Blood Glucose flow sheet  reviewed  through 10/28     Recommendations     Levemir 20 units at bedtime       Blood glucose monitoring   With weekly review by MFM     My chart message sent

## 2021-11-09 ENCOUNTER — TELEPHONE (OUTPATIENT)
Dept: PERINATAL CARE | Facility: HOSPITAL | Age: 41
End: 2021-11-09

## 2021-11-09 NOTE — TELEPHONE ENCOUNTER
No change - continue current regimen. Continue Insulin  Levemir 18 U QHS    Mariella Manrique. Nat Friedman M.D.   Maternal-Fetal Medicine

## 2021-11-09 NOTE — TELEPHONE ENCOUNTER
26w6d  Received BS log for date range 11/1-11/8     Low  High Out of Range   Fasting Blood Sugar 69 92 0/8   Post Breakfast 100 114 0/8   Post Lunch 77 118 0/8   Post Dinner 97 116 0/8     Patient is currently taking     Levemir 20 units at bedtime  Rx   P

## 2021-11-11 NOTE — PROGRESS NOTES
Lily Navarrete  Dear Dr. Fernando Erazo     Thank you for requesting an ultrasound and maternal-fetal medicine consultation on your patient Reynaldo Lund.   As you are aware she is a 46year old female  with a mejia pregnan compliance is adequate. Her capillary blood glucose records were reviewed today; her compliance with the recommended four times daily assessments (fasting and two-hour post-prandial) is adequate.   Her overall glucose control is adequate.     Her complianc

## 2021-11-16 ENCOUNTER — ROUTINE PRENATAL (OUTPATIENT)
Dept: OBGYN CLINIC | Facility: CLINIC | Age: 41
End: 2021-11-16
Payer: COMMERCIAL

## 2021-11-16 ENCOUNTER — LAB ENCOUNTER (OUTPATIENT)
Dept: LAB | Facility: HOSPITAL | Age: 41
End: 2021-11-16
Attending: OBSTETRICS & GYNECOLOGY
Payer: COMMERCIAL

## 2021-11-16 ENCOUNTER — TELEPHONE (OUTPATIENT)
Dept: OBGYN CLINIC | Facility: CLINIC | Age: 41
End: 2021-11-16

## 2021-11-16 VITALS
DIASTOLIC BLOOD PRESSURE: 74 MMHG | WEIGHT: 179.81 LBS | SYSTOLIC BLOOD PRESSURE: 115 MMHG | HEART RATE: 85 BPM | BODY MASS INDEX: 34 KG/M2

## 2021-11-16 DIAGNOSIS — Z34.02 ENCOUNTER FOR SUPERVISION OF NORMAL FIRST PREGNANCY IN SECOND TRIMESTER: Primary | ICD-10-CM

## 2021-11-16 PROCEDURE — 90715 TDAP VACCINE 7 YRS/> IM: CPT | Performed by: OBSTETRICS & GYNECOLOGY

## 2021-11-16 PROCEDURE — 81002 URINALYSIS NONAUTO W/O SCOPE: CPT | Performed by: OBSTETRICS & GYNECOLOGY

## 2021-11-16 PROCEDURE — 86901 BLOOD TYPING SEROLOGIC RH(D): CPT | Performed by: OBSTETRICS & GYNECOLOGY

## 2021-11-16 PROCEDURE — 3078F DIAST BP <80 MM HG: CPT | Performed by: OBSTETRICS & GYNECOLOGY

## 2021-11-16 PROCEDURE — 3074F SYST BP LT 130 MM HG: CPT | Performed by: OBSTETRICS & GYNECOLOGY

## 2021-11-16 PROCEDURE — 86900 BLOOD TYPING SEROLOGIC ABO: CPT | Performed by: OBSTETRICS & GYNECOLOGY

## 2021-11-16 PROCEDURE — 86850 RBC ANTIBODY SCREEN: CPT | Performed by: OBSTETRICS & GYNECOLOGY

## 2021-11-16 PROCEDURE — 90471 IMMUNIZATION ADMIN: CPT | Performed by: OBSTETRICS & GYNECOLOGY

## 2021-11-16 PROCEDURE — 36415 COLL VENOUS BLD VENIPUNCTURE: CPT | Performed by: OBSTETRICS & GYNECOLOGY

## 2021-11-16 NOTE — TELEPHONE ENCOUNTER
Pt needs rhogam on Thursday. She has MFM appt that day and can come before or after for rhogam.  She was seen today at 27w6d and didn't have her blood type repeated yet.

## 2021-11-16 NOTE — PROGRESS NOTES
No issues. BS being managed by [facility]. On 18L at bedtime. Tdap today. Needs to do blood type for rhogam.  Has [facility] Thursday. RTC 2 wks.

## 2021-11-18 ENCOUNTER — NURSE ONLY (OUTPATIENT)
Dept: OBGYN CLINIC | Facility: CLINIC | Age: 41
End: 2021-11-18
Payer: COMMERCIAL

## 2021-11-18 ENCOUNTER — TELEPHONE (OUTPATIENT)
Dept: PERINATAL CARE | Facility: HOSPITAL | Age: 41
End: 2021-11-18

## 2021-11-18 ENCOUNTER — HOSPITAL ENCOUNTER (OUTPATIENT)
Dept: PERINATAL CARE | Facility: HOSPITAL | Age: 41
Discharge: HOME | End: 2021-11-18
Attending: OBSTETRICS & GYNECOLOGY
Payer: COMMERCIAL

## 2021-11-18 VITALS
HEART RATE: 89 BPM | WEIGHT: 179 LBS | DIASTOLIC BLOOD PRESSURE: 71 MMHG | SYSTOLIC BLOOD PRESSURE: 133 MMHG | BODY MASS INDEX: 34 KG/M2

## 2021-11-18 VITALS — DIASTOLIC BLOOD PRESSURE: 79 MMHG | HEART RATE: 83 BPM | SYSTOLIC BLOOD PRESSURE: 114 MMHG

## 2021-11-18 DIAGNOSIS — O24.319 PREGESTATIONAL DIABETES MELLITUS, MODIFIED WHITE CLASS B: ICD-10-CM

## 2021-11-18 DIAGNOSIS — O09.519 ADVANCED MATERNAL AGE, PRIMIGRAVIDA, ANTEPARTUM: Chronic | ICD-10-CM

## 2021-11-18 DIAGNOSIS — O24.415 GESTATIONAL DIABETES MELLITUS (GDM) IN SECOND TRIMESTER CONTROLLED ON ORAL HYPOGLYCEMIC DRUG: ICD-10-CM

## 2021-11-18 DIAGNOSIS — E78.6 ELEVATED CHOLESTEROL/HIGH DENSITY LIPOPROTEIN RATIO: ICD-10-CM

## 2021-11-18 DIAGNOSIS — Z67.91 RH NEGATIVE STATE IN ANTEPARTUM PERIOD, THIRD TRIMESTER: Primary | ICD-10-CM

## 2021-11-18 DIAGNOSIS — E11.9 TYPE 2 DIABETES MELLITUS WITHOUT COMPLICATION, WITHOUT LONG-TERM CURRENT USE OF INSULIN (HCC): ICD-10-CM

## 2021-11-18 DIAGNOSIS — O26.893 RH NEGATIVE STATE IN ANTEPARTUM PERIOD, THIRD TRIMESTER: Primary | ICD-10-CM

## 2021-11-18 DIAGNOSIS — O99.210 OBESITY IN PREGNANCY: Primary | Chronic | ICD-10-CM

## 2021-11-18 DIAGNOSIS — O99.210 OBESITY IN PREGNANCY: Chronic | ICD-10-CM

## 2021-11-18 PROCEDURE — 76816 OB US FOLLOW-UP PER FETUS: CPT | Performed by: OBSTETRICS & GYNECOLOGY

## 2021-11-18 PROCEDURE — 3078F DIAST BP <80 MM HG: CPT | Performed by: OBSTETRICS & GYNECOLOGY

## 2021-11-18 PROCEDURE — 3074F SYST BP LT 130 MM HG: CPT | Performed by: OBSTETRICS & GYNECOLOGY

## 2021-11-18 PROCEDURE — 99214 OFFICE O/P EST MOD 30 MIN: CPT | Performed by: OBSTETRICS & GYNECOLOGY

## 2021-11-18 PROCEDURE — 96372 THER/PROPH/DIAG INJ SC/IM: CPT | Performed by: OBSTETRICS & GYNECOLOGY

## 2021-11-18 RX ORDER — BLOOD SUGAR DIAGNOSTIC
1 STRIP MISCELLANEOUS 4 TIMES DAILY
Qty: 150 STRIP | Refills: 3 | Status: SHIPPED | OUTPATIENT
Start: 2021-11-18 | End: 2022-02-04

## 2021-11-18 RX ORDER — PEN NEEDLE, DIABETIC 31 GX5/16"
NEEDLE, DISPOSABLE MISCELLANEOUS
Qty: 100 EACH | Refills: 2 | Status: SHIPPED | OUTPATIENT
Start: 2021-11-18

## 2021-11-18 RX ORDER — PEN NEEDLE, DIABETIC 30 GX3/16"
1 NEEDLE, DISPOSABLE MISCELLANEOUS AS DIRECTED
Qty: 1 EACH | Refills: 3 | Status: SHIPPED | OUTPATIENT
Start: 2021-11-18 | End: 2021-11-18 | Stop reason: CLARIF

## 2021-11-18 NOTE — PROGRESS NOTES
Prenatal patient in today for Routine Rhogam injection. Patient is 28w1d today. Patient without complaints. Injection given left UPPER QUAD. GLUTEUS . Patient tolerated well. Rhogam information sheet provided.  Patient instructed to contact office with any

## 2021-11-18 NOTE — TELEPHONE ENCOUNTER
Blood Glucose flow sheet  reviewed  through 11/17        Recommendations   Continue Levemir 18 U QHS    Blood glucose monitoring   With weekly review by AMIRA

## 2021-11-18 NOTE — TELEPHONE ENCOUNTER
Blood Glucose flow sheet  reviewed  through 11/17        Recommendations     Levemir 18 U QHS    Blood glucose monitoring   With weekly review by MFM  My chart message sent

## 2021-11-23 ENCOUNTER — PATIENT MESSAGE (OUTPATIENT)
Dept: OBGYN CLINIC | Facility: CLINIC | Age: 41
End: 2021-11-23

## 2021-11-24 ENCOUNTER — TELEPHONE (OUTPATIENT)
Dept: PERINATAL CARE | Facility: HOSPITAL | Age: 41
End: 2021-11-24

## 2021-11-24 NOTE — TELEPHONE ENCOUNTER
From: Jimy Hook  To: Florencio Owusu DO  Sent: 11/23/2021 10:41 PM CST  Subject: Blood Glucose Record    Dr. Desmond Nicole requested that I submit weekly records of my blood glucose.

## 2021-11-24 NOTE — TELEPHONE ENCOUNTER
Blood Glucose flow sheet  reviewed  through 11/23/21      Recommendations    Continue  Levemir 18 units at bedtime    Blood glucose monitoring   With weekly review by AMIRA

## 2021-12-01 ENCOUNTER — TELEPHONE (OUTPATIENT)
Dept: OBGYN CLINIC | Facility: CLINIC | Age: 41
End: 2021-12-01

## 2021-12-01 ENCOUNTER — ROUTINE PRENATAL (OUTPATIENT)
Dept: OBGYN CLINIC | Facility: CLINIC | Age: 41
End: 2021-12-01
Payer: COMMERCIAL

## 2021-12-01 ENCOUNTER — TELEPHONE (OUTPATIENT)
Dept: PERINATAL CARE | Facility: HOSPITAL | Age: 41
End: 2021-12-01

## 2021-12-01 VITALS
HEART RATE: 89 BPM | BODY MASS INDEX: 34 KG/M2 | WEIGHT: 178 LBS | DIASTOLIC BLOOD PRESSURE: 74 MMHG | SYSTOLIC BLOOD PRESSURE: 116 MMHG

## 2021-12-01 DIAGNOSIS — Z34.03 ENCOUNTER FOR SUPERVISION OF NORMAL FIRST PREGNANCY IN THIRD TRIMESTER: Primary | ICD-10-CM

## 2021-12-01 PROBLEM — O99.210 OBESITY IN PREGNANCY: Chronic | Status: RESOLVED | Noted: 2021-06-23 | Resolved: 2021-12-01

## 2021-12-01 PROBLEM — O09.519 ADVANCED MATERNAL AGE, PRIMIGRAVIDA, ANTEPARTUM (HCC): Chronic | Status: RESOLVED | Noted: 2021-06-23 | Resolved: 2021-12-01

## 2021-12-01 PROBLEM — E78.6 ELEVATED CHOLESTEROL/HIGH DENSITY LIPOPROTEIN RATIO: Status: RESOLVED | Noted: 2018-09-17 | Resolved: 2021-12-01

## 2021-12-01 PROBLEM — O09.519 ADVANCED MATERNAL AGE, PRIMIGRAVIDA, ANTEPARTUM: Chronic | Status: RESOLVED | Noted: 2021-06-23 | Resolved: 2021-12-01

## 2021-12-01 PROBLEM — O99.210 OBESITY IN PREGNANCY (HCC): Chronic | Status: RESOLVED | Noted: 2021-06-23 | Resolved: 2021-12-01

## 2021-12-01 PROCEDURE — 3078F DIAST BP <80 MM HG: CPT | Performed by: OBSTETRICS & GYNECOLOGY

## 2021-12-01 PROCEDURE — 81002 URINALYSIS NONAUTO W/O SCOPE: CPT | Performed by: OBSTETRICS & GYNECOLOGY

## 2021-12-01 PROCEDURE — 3074F SYST BP LT 130 MM HG: CPT | Performed by: OBSTETRICS & GYNECOLOGY

## 2021-12-01 NOTE — TELEPHONE ENCOUNTER
FMLA forms for spouse received in forms department, logged for processing, incomplete HIPAA/FCR attached with forms. Sent pt a [employer] message for missing HIPAA.

## 2021-12-01 NOTE — TELEPHONE ENCOUNTER
Blood Glucose flow sheet  reviewed  through 11/30/21         Recommendations    Continue Levemir 18 units at bedtime    Blood glucose monitoring   With weekly review by AMIRA

## 2021-12-01 NOTE — TELEPHONE ENCOUNTER
Patient dropped off [place] paperwork for her . Patient will  paperwork when ready. AILEEN received  Payment collected    Thank you.

## 2021-12-07 ENCOUNTER — PATIENT MESSAGE (OUTPATIENT)
Dept: OBGYN CLINIC | Facility: CLINIC | Age: 41
End: 2021-12-07

## 2021-12-07 NOTE — TELEPHONE ENCOUNTER
Returned pt's call, pt checking on status of FMLA forms, informed pt of turnaround time, pt verbalized understanding. Pt would like to  completed forms, please notify pt when forms are ready for  via phone call or MyChart.

## 2021-12-08 ENCOUNTER — TELEPHONE (OUTPATIENT)
Dept: PERINATAL CARE | Facility: HOSPITAL | Age: 41
End: 2021-12-08

## 2021-12-08 NOTE — TELEPHONE ENCOUNTER
Blood Glucose flow sheet  reviewed  through 12/7        Recommendations:    Continue Levemir 18 units at bedtime    Blood glucose monitoring   With weekly review by AMIRA

## 2021-12-08 NOTE — TELEPHONE ENCOUNTER
From: Yaron Salgado  To: Jer Hanna MD  Sent: 12/7/2021 11:02 PM CST  Subject: Weekly Glucose Readings     Hi. Submitting my weekly glucose readings for the week of December 1st through December 7th.

## 2021-12-13 ENCOUNTER — ROUTINE PRENATAL (OUTPATIENT)
Dept: OBGYN CLINIC | Facility: CLINIC | Age: 41
End: 2021-12-13
Payer: COMMERCIAL

## 2021-12-13 VITALS
HEART RATE: 80 BPM | DIASTOLIC BLOOD PRESSURE: 65 MMHG | SYSTOLIC BLOOD PRESSURE: 96 MMHG | BODY MASS INDEX: 34 KG/M2 | WEIGHT: 177.38 LBS

## 2021-12-13 DIAGNOSIS — Z34.91 ENCOUNTER FOR SUPERVISION OF NORMAL PREGNANCY IN FIRST TRIMESTER, UNSPECIFIED GRAVIDITY: Primary | ICD-10-CM

## 2021-12-13 DIAGNOSIS — O24.319 PREGESTATIONAL DIABETES MELLITUS, MODIFIED WHITE CLASS B: ICD-10-CM

## 2021-12-13 PROCEDURE — 3078F DIAST BP <80 MM HG: CPT | Performed by: OBSTETRICS & GYNECOLOGY

## 2021-12-13 PROCEDURE — 3074F SYST BP LT 130 MM HG: CPT | Performed by: OBSTETRICS & GYNECOLOGY

## 2021-12-13 PROCEDURE — 81002 URINALYSIS NONAUTO W/O SCOPE: CPT | Performed by: OBSTETRICS & GYNECOLOGY

## 2021-12-13 NOTE — PROGRESS NOTES
Here with . MFM manages BS.  0+0+0+18 Albertine Favor. Got covid booster this month. Order for NST.   RTC 2 wk

## 2021-12-14 ENCOUNTER — PATIENT MESSAGE (OUTPATIENT)
Dept: OBGYN CLINIC | Facility: CLINIC | Age: 41
End: 2021-12-14

## 2021-12-15 ENCOUNTER — TELEPHONE (OUTPATIENT)
Dept: PERINATAL CARE | Facility: HOSPITAL | Age: 41
End: 2021-12-15

## 2021-12-15 NOTE — TELEPHONE ENCOUNTER
Blood Glucose flow sheet  reviewed  through 12/14 21        Recommendations:     Continue ADA diet    Blood glucose monitoring   With weekly review by MFM  My chart message sent

## 2021-12-17 ENCOUNTER — HOSPITAL ENCOUNTER (OUTPATIENT)
Dept: PERINATAL CARE | Facility: HOSPITAL | Age: 41
Discharge: HOME | End: 2021-12-17
Attending: OBSTETRICS & GYNECOLOGY
Payer: COMMERCIAL

## 2021-12-17 VITALS
DIASTOLIC BLOOD PRESSURE: 71 MMHG | BODY MASS INDEX: 33 KG/M2 | HEART RATE: 86 BPM | WEIGHT: 177 LBS | SYSTOLIC BLOOD PRESSURE: 134 MMHG

## 2021-12-17 DIAGNOSIS — O09.513 AMA (ADVANCED MATERNAL AGE) PRIMIGRAVIDA 35+, THIRD TRIMESTER: ICD-10-CM

## 2021-12-17 DIAGNOSIS — O24.319 PREGESTATIONAL DIABETES MELLITUS, MODIFIED WHITE CLASS B: Primary | ICD-10-CM

## 2021-12-17 DIAGNOSIS — O24.319 PREGESTATIONAL DIABETES MELLITUS, MODIFIED WHITE CLASS B: ICD-10-CM

## 2021-12-17 PROCEDURE — 76816 OB US FOLLOW-UP PER FETUS: CPT | Performed by: OBSTETRICS & GYNECOLOGY

## 2021-12-17 PROCEDURE — 99212 OFFICE O/P EST SF 10 MIN: CPT | Performed by: OBSTETRICS & GYNECOLOGY

## 2021-12-17 PROCEDURE — 76819 FETAL BIOPHYS PROFIL W/O NST: CPT | Performed by: OBSTETRICS & GYNECOLOGY

## 2021-12-17 NOTE — PROGRESS NOTES
Raul Gomez    Dear Dr. Christen De Santiago    Thank you for requesting ultrasound evaluation and maternal fetal medicine consultation on your patient Tejal Malave.   As you are aware she is a 46year old female  with a singleto Ultrasound Report  I interpreted the results and reviewed them with the patient.     DISCUSSION  During her visit we discussed and reviewed the following issues:  See prior M note form 8/31/21 for a detailed review.     GESTATIONAL DIABETES - follow-up  B complication [id number]    Thank you for allowing me to participate in the care of your patient. Please do not hesitate to contact me if additional questions or concerns arise.       Sally Adams MD, M.D.     20 minutes spent in review of records, documentat

## 2021-12-21 ENCOUNTER — HOSPITAL ENCOUNTER (OUTPATIENT)
Dept: PERINATAL CARE | Facility: HOSPITAL | Age: 41
Discharge: HOME | End: 2021-12-21
Attending: OBSTETRICS & GYNECOLOGY
Payer: COMMERCIAL

## 2021-12-21 DIAGNOSIS — O24.319 PREGESTATIONAL DIABETES MELLITUS, MODIFIED WHITE CLASS B: Primary | ICD-10-CM

## 2021-12-21 PROCEDURE — 59025 FETAL NON-STRESS TEST: CPT | Performed by: OBSTETRICS & GYNECOLOGY

## 2021-12-21 PROCEDURE — 59025 FETAL NON-STRESS TEST: CPT

## 2021-12-22 ENCOUNTER — TELEPHONE (OUTPATIENT)
Dept: PERINATAL CARE | Facility: HOSPITAL | Age: 41
End: 2021-12-22

## 2021-12-22 NOTE — TELEPHONE ENCOUNTER
Blood Glucose flow sheet  reviewed  through 12/21/21        Recommendations:  Continue ADA diet  Levemir 18 units QHS    Blood glucose monitoring   With weekly review by AMIRA

## 2021-12-22 NOTE — TELEPHONE ENCOUNTER
Blood Glucose flow sheet  reviewed  through 12/21/21        Recommendations:    Continue ADA diet    Blood glucose monitoring   With weekly review by AMIRA

## 2021-12-23 ENCOUNTER — NST DOCUMENTATION (OUTPATIENT)
Dept: OBGYN CLINIC | Facility: CLINIC | Age: 41
End: 2021-12-23

## 2021-12-23 NOTE — NST
Nonstress Test   Patient: Yumiko Lizama    Gestation: 33w1d    Diagnosis from order: Pregestational diabetes mellitus, modified White class B       NST:         NST DOCUMENTATION 12/21/2021   Variability 6-25 BPM   Accelerations Yes   Decelerations N

## 2021-12-28 ENCOUNTER — HOSPITAL ENCOUNTER (OUTPATIENT)
Dept: PERINATAL CARE | Facility: HOSPITAL | Age: 41
Discharge: HOME | End: 2021-12-28
Attending: OBSTETRICS & GYNECOLOGY
Payer: COMMERCIAL

## 2021-12-28 ENCOUNTER — TELEPHONE (OUTPATIENT)
Dept: PERINATAL CARE | Facility: HOSPITAL | Age: 41
End: 2021-12-28

## 2021-12-28 ENCOUNTER — ROUTINE PRENATAL (OUTPATIENT)
Dept: OBGYN CLINIC | Facility: CLINIC | Age: 41
End: 2021-12-28

## 2021-12-28 VITALS
SYSTOLIC BLOOD PRESSURE: 107 MMHG | BODY MASS INDEX: 34 KG/M2 | HEART RATE: 97 BPM | WEIGHT: 179 LBS | DIASTOLIC BLOOD PRESSURE: 74 MMHG

## 2021-12-28 DIAGNOSIS — O24.319 PREGESTATIONAL DIABETES MELLITUS, MODIFIED WHITE CLASS B: Primary | ICD-10-CM

## 2021-12-28 DIAGNOSIS — Z34.93 ENCOUNTER FOR SUPERVISION OF NORMAL PREGNANCY IN THIRD TRIMESTER, UNSPECIFIED GRAVIDITY: Primary | ICD-10-CM

## 2021-12-28 PROCEDURE — 59025 FETAL NON-STRESS TEST: CPT

## 2021-12-28 PROCEDURE — 81002 URINALYSIS NONAUTO W/O SCOPE: CPT | Performed by: OBSTETRICS & GYNECOLOGY

## 2021-12-28 PROCEDURE — 3078F DIAST BP <80 MM HG: CPT | Performed by: OBSTETRICS & GYNECOLOGY

## 2021-12-28 PROCEDURE — 3074F SYST BP LT 130 MM HG: CPT | Performed by: OBSTETRICS & GYNECOLOGY

## 2021-12-28 PROCEDURE — 59025 FETAL NON-STRESS TEST: CPT | Performed by: OBSTETRICS & GYNECOLOGY

## 2021-12-28 NOTE — TELEPHONE ENCOUNTER
Dr. Radha Win,     Please sign off on form:  -Highlight the patient and hit \"Chart\" button. -In Chart Review, w/in the Encounter tab - click 1 time on the Telephone call encounter for 12/1/21Scroll down the telephone encounter.  -Click \"scan on\" blue Hyperlink under \"Media\" heading for FMLA for spouse Dr. Radha Win 12/28/21 w/in the telephone enc.  -Click on Acknowledge button at the bottom right corner and left-click onto image, signature stamp appears and drag signature to Provider signature line. Stamp will turn blue. Close window.      Thank you,    Apryl Browning

## 2021-12-28 NOTE — TELEPHONE ENCOUNTER
Blood Glucose flow sheet  reviewed  through 12/27/21        Recommendations:  Continue ADA diet  Levemir 18 units QHS     Blood glucose monitoring   With weekly review by AMIRA

## 2022-01-04 ENCOUNTER — TELEPHONE (OUTPATIENT)
Dept: PERINATAL CARE | Facility: HOSPITAL | Age: 42
End: 2022-01-04

## 2022-01-04 ENCOUNTER — PATIENT MESSAGE (OUTPATIENT)
Dept: OBGYN CLINIC | Facility: CLINIC | Age: 42
End: 2022-01-04

## 2022-01-04 ENCOUNTER — HOSPITAL ENCOUNTER (OUTPATIENT)
Dept: PERINATAL CARE | Facility: HOSPITAL | Age: 42
Discharge: HOME | End: 2022-01-04
Attending: OBSTETRICS & GYNECOLOGY
Payer: COMMERCIAL

## 2022-01-04 DIAGNOSIS — O24.319 PREGESTATIONAL DIABETES MELLITUS, MODIFIED WHITE CLASS B: Primary | ICD-10-CM

## 2022-01-04 PROCEDURE — 59025 FETAL NON-STRESS TEST: CPT

## 2022-01-04 NOTE — TELEPHONE ENCOUNTER
Blood Glucose flow sheet  reviewed  through 1/3/2022        Recommendations:  Continue ADA diet  Levemir 18 units QHS     Blood glucose monitoring   With weekly review by HONEYM

## 2022-01-04 NOTE — TELEPHONE ENCOUNTER
Patient called for status, form signed, printed copy and placed by OB check in at [facility] for spouse Kassi Card to .

## 2022-01-05 NOTE — TELEPHONE ENCOUNTER
From: Ward Junior  To: Mallika Denton MD  Sent: 1/4/2022 10:11 AM CST  Subject: Signature For Important Documents     Hi. My  and I are waiting for Dr. Patrick North to sign off on his paternity leave paperwork.  We have an appointment today at [facility]

## 2022-01-07 ENCOUNTER — HOSPITAL ENCOUNTER (OUTPATIENT)
Dept: PERINATAL CARE | Facility: HOSPITAL | Age: 42
Discharge: HOME | End: 2022-01-07
Attending: OBSTETRICS & GYNECOLOGY
Payer: COMMERCIAL

## 2022-01-07 VITALS — DIASTOLIC BLOOD PRESSURE: 66 MMHG | SYSTOLIC BLOOD PRESSURE: 107 MMHG | HEART RATE: 88 BPM

## 2022-01-07 DIAGNOSIS — O28.8 NON-REACTIVE NST (NON-STRESS TEST): ICD-10-CM

## 2022-01-07 DIAGNOSIS — O28.8 NON-REACTIVE NST (NON-STRESS TEST): Primary | ICD-10-CM

## 2022-01-07 DIAGNOSIS — O24.319 PREGESTATIONAL DIABETES MELLITUS, MODIFIED WHITE CLASS B: ICD-10-CM

## 2022-01-07 DIAGNOSIS — O24.319 PREGESTATIONAL DIABETES MELLITUS, MODIFIED WHITE CLASS B: Primary | ICD-10-CM

## 2022-01-07 PROCEDURE — 76819 FETAL BIOPHYS PROFIL W/O NST: CPT | Performed by: OBSTETRICS & GYNECOLOGY

## 2022-01-07 PROCEDURE — 76815 OB US LIMITED FETUS(S): CPT | Performed by: OBSTETRICS & GYNECOLOGY

## 2022-01-07 PROCEDURE — 59025 FETAL NON-STRESS TEST: CPT

## 2022-01-07 PROCEDURE — 59025 FETAL NON-STRESS TEST: CPT | Performed by: OBSTETRICS & GYNECOLOGY

## 2022-01-07 NOTE — PROGRESS NOTES
Outpatient Maternal-Fetal Medicine Follow-Up    LMP 05/05/2021       OB ULTRASOUND REPORT   See imaging tab for complete ultrasound report or in PACS    Single IUP in cephalic presentation. Placenta is anterior. A 3 vessel cord is noted.   Cardiac acti

## 2022-01-11 ENCOUNTER — HOSPITAL ENCOUNTER (OUTPATIENT)
Dept: PERINATAL CARE | Facility: HOSPITAL | Age: 42
Discharge: HOME | End: 2022-01-11
Attending: OBSTETRICS & GYNECOLOGY
Payer: COMMERCIAL

## 2022-01-11 ENCOUNTER — ROUTINE PRENATAL (OUTPATIENT)
Dept: OBGYN CLINIC | Facility: CLINIC | Age: 42
End: 2022-01-11
Payer: COMMERCIAL

## 2022-01-11 ENCOUNTER — TELEPHONE (OUTPATIENT)
Dept: PERINATAL CARE | Facility: HOSPITAL | Age: 42
End: 2022-01-11

## 2022-01-11 ENCOUNTER — LAB ENCOUNTER (OUTPATIENT)
Dept: LAB | Facility: HOSPITAL | Age: 42
End: 2022-01-11
Attending: OBSTETRICS & GYNECOLOGY
Payer: COMMERCIAL

## 2022-01-11 VITALS
SYSTOLIC BLOOD PRESSURE: 118 MMHG | WEIGHT: 181.38 LBS | DIASTOLIC BLOOD PRESSURE: 80 MMHG | BODY MASS INDEX: 34 KG/M2 | HEART RATE: 79 BPM

## 2022-01-11 DIAGNOSIS — Z34.91 ENCOUNTER FOR SUPERVISION OF NORMAL PREGNANCY IN FIRST TRIMESTER, UNSPECIFIED GRAVIDITY: Primary | ICD-10-CM

## 2022-01-11 DIAGNOSIS — Z34.91 ENCOUNTER FOR SUPERVISION OF NORMAL PREGNANCY IN FIRST TRIMESTER, UNSPECIFIED GRAVIDITY: ICD-10-CM

## 2022-01-11 DIAGNOSIS — O24.319 PREGESTATIONAL DIABETES MELLITUS, MODIFIED WHITE CLASS B: Primary | ICD-10-CM

## 2022-01-11 LAB
APPEARANCE: CLEAR
BILIRUBIN: NEGATIVE
DEPRECATED RDW RBC AUTO: 51.1 FL (ref 35.1–46.3)
ERYTHROCYTE [DISTWIDTH] IN BLOOD BY AUTOMATED COUNT: 14.9 % (ref 11–15)
GLUCOSE (URINE DIPSTICK): 100 MG/DL
HCT VFR BLD AUTO: 40 %
HGB BLD-MCNC: 12.9 G/DL
KETONES (URINE DIPSTICK): 80 MG/DL
LEUKOCYTES: NEGATIVE
MCH RBC QN AUTO: 30.3 PG (ref 26–34)
MCHC RBC AUTO-ENTMCNC: 32.3 G/DL (ref 31–37)
MCV RBC AUTO: 93.9 FL
MULTISTIX LOT#: 1027 NUMERIC
NITRITE, URINE: NEGATIVE
OCCULT BLOOD: NEGATIVE
PH, URINE: 7 (ref 4.5–8)
PLATELET # BLD AUTO: 199 10(3)UL (ref 150–450)
PROTEIN (URINE DIPSTICK): NEGATIVE MG/DL
RBC # BLD AUTO: 4.26 X10(6)UL
SPECIFIC GRAVITY: 1.02 (ref 1–1.03)
URINE-COLOR: YELLOW
UROBILINOGEN,SEMI-QN: 0.2 MG/DL (ref 0–1.9)
WBC # BLD AUTO: 10.5 X10(3) UL (ref 4–11)

## 2022-01-11 PROCEDURE — 86780 TREPONEMA PALLIDUM: CPT

## 2022-01-11 PROCEDURE — 36415 COLL VENOUS BLD VENIPUNCTURE: CPT

## 2022-01-11 PROCEDURE — 59025 FETAL NON-STRESS TEST: CPT | Performed by: OBSTETRICS & GYNECOLOGY

## 2022-01-11 PROCEDURE — 85027 COMPLETE CBC AUTOMATED: CPT

## 2022-01-11 PROCEDURE — 81002 URINALYSIS NONAUTO W/O SCOPE: CPT | Performed by: OBSTETRICS & GYNECOLOGY

## 2022-01-11 PROCEDURE — 87389 HIV-1 AG W/HIV-1&-2 AB AG IA: CPT

## 2022-01-11 PROCEDURE — 59025 FETAL NON-STRESS TEST: CPT

## 2022-01-11 PROCEDURE — 3074F SYST BP LT 130 MM HG: CPT | Performed by: OBSTETRICS & GYNECOLOGY

## 2022-01-11 PROCEDURE — 3079F DIAST BP 80-89 MM HG: CPT | Performed by: OBSTETRICS & GYNECOLOGY

## 2022-01-11 NOTE — PROGRESS NOTES
Pt just had juice and rik crackers for her NST right before she came in - reassured her about her urine dip results.  Labs ordered, had BPP last Friday and growth US this coming friday

## 2022-01-11 NOTE — TELEPHONE ENCOUNTER
Blood Glucose flow sheet  reviewed  through 1/10         Recommendations    Blood glucose monitoring   With weekly review by AMIRA  Continue ADA diet  Levemir 18 units at bedtime    My chart message sent

## 2022-01-12 ENCOUNTER — PATIENT MESSAGE (OUTPATIENT)
Dept: OBGYN CLINIC | Facility: CLINIC | Age: 42
End: 2022-01-12

## 2022-01-12 LAB — T PALLIDUM AB SER QL: NEGATIVE

## 2022-01-12 NOTE — PROGRESS NOTES
Ervin Giles    Dear Dr. Cari Boeck    Thank you for requesting ultrasound evaluation and maternal fetal medicine consultation on your patient Racquel Mckinnon.   As you are aware she is a 46year old female  with a singleto recommended four times daily assessments (fasting and two-hour post-prandial) is good. Her overall glucose control is good. Her compliance with her insulin regimen was reviewed and it is good.    Her current insulin regimen is:  Levemir 18 units at bedt

## 2022-01-13 ENCOUNTER — NST DOCUMENTATION (OUTPATIENT)
Dept: OBGYN CLINIC | Facility: CLINIC | Age: 42
End: 2022-01-13

## 2022-01-13 ENCOUNTER — TELEPHONE (OUTPATIENT)
Dept: OBGYN CLINIC | Facility: CLINIC | Age: 42
End: 2022-01-13

## 2022-01-13 NOTE — TELEPHONE ENCOUNTER
From: Lindsay Virgen  To: Claudy Maynard MD  Sent: 1/12/2022 8:30 PM CST  Subject: Weekly Glucose Readings     Hi. I'm submitting my weekly glucose readings for the week of January 5th through January 11.

## 2022-01-13 NOTE — NST
Nonstress Test   Patient: Cristian Hirsch    Gestation: 38P2V    Diagnosis from order: Pregestational diabetes mellitus, modified White class B       NST:         NST DOCUMENTATION 1/11/2022   Variability 6-25 BPM   Accelerations Yes   Decelerations No

## 2022-01-14 ENCOUNTER — HOSPITAL ENCOUNTER (OUTPATIENT)
Dept: PERINATAL CARE | Facility: HOSPITAL | Age: 42
Discharge: HOME | End: 2022-01-14
Attending: OBSTETRICS & GYNECOLOGY
Payer: COMMERCIAL

## 2022-01-14 VITALS
DIASTOLIC BLOOD PRESSURE: 78 MMHG | SYSTOLIC BLOOD PRESSURE: 124 MMHG | BODY MASS INDEX: 34 KG/M2 | HEART RATE: 80 BPM | WEIGHT: 181 LBS

## 2022-01-14 DIAGNOSIS — O24.319 PREGESTATIONAL DIABETES MELLITUS, MODIFIED WHITE CLASS B: ICD-10-CM

## 2022-01-14 DIAGNOSIS — O24.319 PREGESTATIONAL DIABETES MELLITUS, MODIFIED WHITE CLASS B: Primary | ICD-10-CM

## 2022-01-14 PROCEDURE — 99214 OFFICE O/P EST MOD 30 MIN: CPT | Performed by: OBSTETRICS & GYNECOLOGY

## 2022-01-14 PROCEDURE — 76816 OB US FOLLOW-UP PER FETUS: CPT | Performed by: OBSTETRICS & GYNECOLOGY

## 2022-01-14 PROCEDURE — 76819 FETAL BIOPHYS PROFIL W/O NST: CPT | Performed by: OBSTETRICS & GYNECOLOGY

## 2022-01-18 ENCOUNTER — PATIENT MESSAGE (OUTPATIENT)
Dept: OBGYN CLINIC | Facility: CLINIC | Age: 42
End: 2022-01-18

## 2022-01-18 ENCOUNTER — HOSPITAL ENCOUNTER (OUTPATIENT)
Dept: PERINATAL CARE | Facility: HOSPITAL | Age: 42
Discharge: HOME | End: 2022-01-18
Attending: OBSTETRICS & GYNECOLOGY
Payer: COMMERCIAL

## 2022-01-18 DIAGNOSIS — O24.319 PREGESTATIONAL DIABETES MELLITUS, MODIFIED WHITE CLASS B: Primary | ICD-10-CM

## 2022-01-18 PROCEDURE — 59025 FETAL NON-STRESS TEST: CPT

## 2022-01-18 PROCEDURE — 59025 FETAL NON-STRESS TEST: CPT | Performed by: OBSTETRICS & GYNECOLOGY

## 2022-01-19 ENCOUNTER — ROUTINE PRENATAL (OUTPATIENT)
Dept: OBGYN CLINIC | Facility: CLINIC | Age: 42
End: 2022-01-19
Payer: COMMERCIAL

## 2022-01-19 VITALS
SYSTOLIC BLOOD PRESSURE: 114 MMHG | WEIGHT: 183 LBS | HEART RATE: 85 BPM | BODY MASS INDEX: 35 KG/M2 | DIASTOLIC BLOOD PRESSURE: 79 MMHG

## 2022-01-19 DIAGNOSIS — Z34.93 ENCOUNTER FOR SUPERVISION OF NORMAL PREGNANCY IN THIRD TRIMESTER, UNSPECIFIED GRAVIDITY: Primary | ICD-10-CM

## 2022-01-19 LAB
APPEARANCE: CLEAR
BILIRUBIN: NEGATIVE
GLUCOSE (URINE DIPSTICK): 100 MG/DL
KETONES (URINE DIPSTICK): NEGATIVE MG/DL
MULTISTIX LOT#: ABNORMAL NUMERIC
NITRITE, URINE: NEGATIVE
OCCULT BLOOD: NEGATIVE
PH, URINE: 7 (ref 4.5–8)
PROTEIN (URINE DIPSTICK): NEGATIVE MG/DL
SPECIFIC GRAVITY: 1.01 (ref 1–1.03)
URINE-COLOR: YELLOW
UROBILINOGEN,SEMI-QN: 0.2 MG/DL (ref 0–1.9)

## 2022-01-19 PROCEDURE — 3078F DIAST BP <80 MM HG: CPT | Performed by: OBSTETRICS & GYNECOLOGY

## 2022-01-19 PROCEDURE — 81002 URINALYSIS NONAUTO W/O SCOPE: CPT | Performed by: OBSTETRICS & GYNECOLOGY

## 2022-01-19 PROCEDURE — 3074F SYST BP LT 130 MM HG: CPT | Performed by: OBSTETRICS & GYNECOLOGY

## 2022-01-19 NOTE — TELEPHONE ENCOUNTER
From: Demarcus Hammond  To: Emilie Herrera MD  Sent: 1/18/2022 10:39 PM CST  Subject: Weekly Glucose Readings     Hi. I'm submitting my weekly glucose readings for the week of January 12 through January 18th.

## 2022-01-20 ENCOUNTER — PATIENT MESSAGE (OUTPATIENT)
Dept: OBGYN CLINIC | Facility: CLINIC | Age: 42
End: 2022-01-20

## 2022-01-20 NOTE — TELEPHONE ENCOUNTER
From: Jorge Calhoun  To: Sravanthi Dotson MD  Sent: 1/20/2022 3:33 PM CST  Subject: Waiting on my Strep B Test Result    Hi. Dr. Ortega Cadena did a Strep B test on me yesterday and I haven't gotten the test results back.  I just wanted to make sure that the test got

## 2022-01-21 ENCOUNTER — TELEPHONE (OUTPATIENT)
Dept: PERINATAL CARE | Facility: HOSPITAL | Age: 42
End: 2022-01-21

## 2022-01-21 ENCOUNTER — HOSPITAL ENCOUNTER (OUTPATIENT)
Dept: PERINATAL CARE | Facility: HOSPITAL | Age: 42
Discharge: HOME | End: 2022-01-21
Attending: OBSTETRICS & GYNECOLOGY
Payer: COMMERCIAL

## 2022-01-21 DIAGNOSIS — O24.319 PREGESTATIONAL DIABETES MELLITUS, MODIFIED WHITE CLASS B: Primary | ICD-10-CM

## 2022-01-21 LAB — GROUP B STREP BY PCR FOR PCR OVT: NEGATIVE

## 2022-01-21 PROCEDURE — 59025 FETAL NON-STRESS TEST: CPT

## 2022-01-21 NOTE — TELEPHONE ENCOUNTER
Blood Glucose flow sheet  reviewed  through 1/20        Recommendations     Blood glucose monitoring   With weekly review by AMIRA  Continue ADA diet  Levemir 18 units at bedtime     My chart message sent

## 2022-01-25 ENCOUNTER — TELEPHONE (OUTPATIENT)
Dept: OBGYN CLINIC | Facility: CLINIC | Age: 42
End: 2022-01-25

## 2022-01-25 ENCOUNTER — HOSPITAL ENCOUNTER (OUTPATIENT)
Dept: PERINATAL CARE | Facility: HOSPITAL | Age: 42
Discharge: HOME | End: 2022-01-25
Attending: OBSTETRICS & GYNECOLOGY
Payer: COMMERCIAL

## 2022-01-25 ENCOUNTER — ROUTINE PRENATAL (OUTPATIENT)
Dept: OBGYN CLINIC | Facility: CLINIC | Age: 42
End: 2022-01-25
Payer: COMMERCIAL

## 2022-01-25 VITALS
BODY MASS INDEX: 35 KG/M2 | SYSTOLIC BLOOD PRESSURE: 124 MMHG | WEIGHT: 183.81 LBS | HEART RATE: 89 BPM | DIASTOLIC BLOOD PRESSURE: 85 MMHG

## 2022-01-25 DIAGNOSIS — O24.319 PREGESTATIONAL DIABETES MELLITUS, MODIFIED WHITE CLASS B: Primary | ICD-10-CM

## 2022-01-25 DIAGNOSIS — Z34.93 ENCOUNTER FOR SUPERVISION OF NORMAL PREGNANCY IN THIRD TRIMESTER, UNSPECIFIED GRAVIDITY: Primary | ICD-10-CM

## 2022-01-25 DIAGNOSIS — Z34.80 SUPERVISION OF OTHER NORMAL PREGNANCY, ANTEPARTUM: Primary | ICD-10-CM

## 2022-01-25 LAB
BILIRUBIN: NEGATIVE
GLUCOSE (URINE DIPSTICK): NEGATIVE MG/DL
KETONES (URINE DIPSTICK): NEGATIVE MG/DL
MULTISTIX LOT#: ABNORMAL NUMERIC
NITRITE, URINE: NEGATIVE
PH, URINE: 7.5 (ref 4.5–8)
PROTEIN (URINE DIPSTICK): NEGATIVE MG/DL
SPECIFIC GRAVITY: 1 (ref 1–1.03)
UROBILINOGEN,SEMI-QN: 0.2 MG/DL (ref 0–1.9)

## 2022-01-25 PROCEDURE — 59426 ANTEPARTUM CARE ONLY: CPT | Performed by: OBSTETRICS & GYNECOLOGY

## 2022-01-25 PROCEDURE — 81002 URINALYSIS NONAUTO W/O SCOPE: CPT | Performed by: OBSTETRICS & GYNECOLOGY

## 2022-01-25 PROCEDURE — 3079F DIAST BP 80-89 MM HG: CPT | Performed by: OBSTETRICS & GYNECOLOGY

## 2022-01-25 PROCEDURE — 59025 FETAL NON-STRESS TEST: CPT

## 2022-01-25 PROCEDURE — 3074F SYST BP LT 130 MM HG: CPT | Performed by: OBSTETRICS & GYNECOLOGY

## 2022-01-25 NOTE — TELEPHONE ENCOUNTER
Spoke with MD group. Plan for IOL at 39 weeks. Scheduled for ripening at 6pm on Tuesday 2/1/22. Please give pt instructions about IOL. Needs outpt covid testing prior to IOL.   Can cancel 2/2 appt since she will be in the hospital for IOL at that time

## 2022-01-25 NOTE — TELEPHONE ENCOUNTER
Pt called and informed of COVID testing needing to be done 72 hours before hand. Pt instructed to have testing done no later then 1/29. Order placed and number to CS given.  Pt instructed that IOL will take place at the [facility] on 2/1 at 6pm. Pt encouraged to e

## 2022-01-25 NOTE — PROGRESS NOTES
No issues. Discussed concerns for IOL timing based on previous office recs of 38 weeks. Will discuss with MD group regarding MFM recs. Doing twice weekly NSTs. RTC 1 wk based on IOL timing.

## 2022-01-28 ENCOUNTER — HOSPITAL ENCOUNTER (OUTPATIENT)
Dept: PERINATAL CARE | Facility: HOSPITAL | Age: 42
Discharge: HOME | End: 2022-01-28
Attending: OBSTETRICS & GYNECOLOGY
Payer: COMMERCIAL

## 2022-01-28 VITALS — DIASTOLIC BLOOD PRESSURE: 51 MMHG | HEART RATE: 87 BPM | SYSTOLIC BLOOD PRESSURE: 119 MMHG

## 2022-01-28 DIAGNOSIS — O24.319 PREGESTATIONAL DIABETES MELLITUS, MODIFIED WHITE CLASS B: Primary | ICD-10-CM

## 2022-01-28 PROCEDURE — 59025 FETAL NON-STRESS TEST: CPT

## 2022-01-29 ENCOUNTER — LAB ENCOUNTER (OUTPATIENT)
Dept: LAB | Facility: HOSPITAL | Age: 42
End: 2022-01-29
Attending: OBSTETRICS & GYNECOLOGY
Payer: COMMERCIAL

## 2022-01-29 DIAGNOSIS — Z34.80 SUPERVISION OF OTHER NORMAL PREGNANCY, ANTEPARTUM: ICD-10-CM

## 2022-01-29 LAB — SARS-COV-2 RNA RESP QL NAA+PROBE: NOT DETECTED

## 2022-02-01 ENCOUNTER — HOSPITAL ENCOUNTER (INPATIENT)
Facility: HOSPITAL | Age: 42
LOS: 3 days | Discharge: HOME | End: 2022-02-04
Attending: OBSTETRICS & GYNECOLOGY | Admitting: OBSTETRICS & GYNECOLOGY
Payer: COMMERCIAL

## 2022-02-01 ENCOUNTER — APPOINTMENT (OUTPATIENT)
Dept: OBGYN CLINIC | Facility: HOSPITAL | Age: 42
End: 2022-02-01
Payer: COMMERCIAL

## 2022-02-01 ENCOUNTER — HOSPITAL ENCOUNTER (OUTPATIENT)
Dept: PERINATAL CARE | Facility: HOSPITAL | Age: 42
Discharge: HOME | End: 2022-02-01
Attending: OBSTETRICS & GYNECOLOGY
Payer: COMMERCIAL

## 2022-02-01 VITALS
WEIGHT: 181 LBS | HEART RATE: 96 BPM | SYSTOLIC BLOOD PRESSURE: 112 MMHG | BODY MASS INDEX: 34 KG/M2 | DIASTOLIC BLOOD PRESSURE: 66 MMHG

## 2022-02-01 DIAGNOSIS — O24.319 PREGESTATIONAL DIABETES MELLITUS, MODIFIED WHITE CLASS B: Primary | ICD-10-CM

## 2022-02-01 PROBLEM — Z34.90 PREGNANCY: Status: ACTIVE | Noted: 2022-02-01

## 2022-02-01 PROBLEM — Z34.90 PREGNANCY (HCC): Status: ACTIVE | Noted: 2022-02-01

## 2022-02-01 LAB
ANTIBODY SCREEN: NEGATIVE
BASOPHILS # BLD AUTO: 0.05 X10(3) UL (ref 0–0.2)
BASOPHILS NFR BLD AUTO: 0.5 %
EOSINOPHIL # BLD AUTO: 0.04 X10(3) UL (ref 0–0.7)
EOSINOPHIL NFR BLD AUTO: 0.4 %
ERYTHROCYTE [DISTWIDTH] IN BLOOD BY AUTOMATED COUNT: 15 % (ref 11–15)
GLUCOSE BLDC GLUCOMTR-MCNC: 77 MG/DL (ref 70–99)
GLUCOSE BLDC GLUCOMTR-MCNC: 89 MG/DL (ref 70–99)
HCT VFR BLD AUTO: 38.8 %
HGB BLD-MCNC: 13 G/DL
IMM GRANULOCYTES # BLD AUTO: 0.05 X10(3) UL (ref 0–1)
IMM GRANULOCYTES NFR BLD: 0.5 %
LYMPHOCYTES # BLD AUTO: 2.31 X10(3) UL (ref 1–4)
LYMPHOCYTES NFR BLD AUTO: 21.5 %
MCH RBC QN AUTO: 31.6 PG (ref 26–34)
MCHC RBC AUTO-ENTMCNC: 33.5 G/DL (ref 31–37)
MCV RBC AUTO: 94.4 FL
MONOCYTES # BLD AUTO: 1.08 X10(3) UL (ref 0.1–1)
MONOCYTES NFR BLD AUTO: 10 %
NEUTROPHILS # BLD AUTO: 7.23 X10 (3) UL (ref 1.5–7.7)
NEUTROPHILS # BLD AUTO: 7.23 X10(3) UL (ref 1.5–7.7)
NEUTROPHILS NFR BLD AUTO: 67.1 %
PLATELET # BLD AUTO: 182 10(3)UL (ref 150–450)
RBC # BLD AUTO: 4.11 X10(6)UL
RH BLOOD TYPE: NEGATIVE
WBC # BLD AUTO: 10.8 X10(3) UL (ref 4–11)

## 2022-02-01 PROCEDURE — 59025 FETAL NON-STRESS TEST: CPT | Performed by: OBSTETRICS & GYNECOLOGY

## 2022-02-01 PROCEDURE — 59025 FETAL NON-STRESS TEST: CPT

## 2022-02-01 PROCEDURE — 3E0P7VZ INTRODUCTION OF HORMONE INTO FEMALE REPRODUCTIVE, VIA NATURAL OR ARTIFICIAL OPENING: ICD-10-PCS | Performed by: OBSTETRICS & GYNECOLOGY

## 2022-02-01 RX ORDER — ONDANSETRON 2 MG/ML
4 INJECTION INTRAMUSCULAR; INTRAVENOUS EVERY 6 HOURS PRN
Status: DISCONTINUED | OUTPATIENT
Start: 2022-02-01 | End: 2022-02-02 | Stop reason: HOSPADM

## 2022-02-01 RX ORDER — HYDROXYZINE HYDROCHLORIDE 50 MG/ML
50 INJECTION, SOLUTION INTRAMUSCULAR
Status: DISCONTINUED | OUTPATIENT
Start: 2022-02-01 | End: 2022-02-03

## 2022-02-01 RX ORDER — TERBUTALINE SULFATE 1 MG/ML
0.25 INJECTION, SOLUTION SUBCUTANEOUS AS NEEDED
Status: DISCONTINUED | OUTPATIENT
Start: 2022-02-01 | End: 2022-02-02 | Stop reason: HOSPADM

## 2022-02-01 RX ORDER — ACETAMINOPHEN 500 MG
500 TABLET ORAL EVERY 6 HOURS PRN
Status: DISCONTINUED | OUTPATIENT
Start: 2022-02-01 | End: 2022-02-02 | Stop reason: HOSPADM

## 2022-02-01 RX ORDER — TRISODIUM CITRATE DIHYDRATE AND CITRIC ACID MONOHYDRATE 500; 334 MG/5ML; MG/5ML
30 SOLUTION ORAL AS NEEDED
Status: DISCONTINUED | OUTPATIENT
Start: 2022-02-01 | End: 2022-02-02 | Stop reason: HOSPADM

## 2022-02-01 RX ORDER — NALBUPHINE HCL 10 MG/ML
10 AMPUL (ML) INJECTION
Status: DISCONTINUED | OUTPATIENT
Start: 2022-02-01 | End: 2022-02-04

## 2022-02-01 RX ORDER — SODIUM CHLORIDE, SODIUM LACTATE, POTASSIUM CHLORIDE, CALCIUM CHLORIDE 600; 310; 30; 20 MG/100ML; MG/100ML; MG/100ML; MG/100ML
INJECTION, SOLUTION INTRAVENOUS CONTINUOUS
Status: DISCONTINUED | OUTPATIENT
Start: 2022-02-01 | End: 2022-02-02 | Stop reason: HOSPADM

## 2022-02-01 RX ORDER — LIDOCAINE HYDROCHLORIDE 10 MG/ML
30 INJECTION, SOLUTION EPIDURAL; INFILTRATION; INTRACAUDAL; PERINEURAL ONCE
Status: COMPLETED | OUTPATIENT
Start: 2022-02-01 | End: 2022-02-02

## 2022-02-01 RX ORDER — ZOLPIDEM TARTRATE 5 MG/1
10 TABLET ORAL NIGHTLY PRN
Status: DISCONTINUED | OUTPATIENT
Start: 2022-02-01 | End: 2022-02-04

## 2022-02-01 RX ORDER — IBUPROFEN 600 MG/1
600 TABLET ORAL EVERY 6 HOURS PRN
Status: DISCONTINUED | OUTPATIENT
Start: 2022-02-01 | End: 2022-02-02 | Stop reason: HOSPADM

## 2022-02-01 RX ORDER — DEXTROSE, SODIUM CHLORIDE, SODIUM LACTATE, POTASSIUM CHLORIDE, AND CALCIUM CHLORIDE 5; .6; .31; .03; .02 G/100ML; G/100ML; G/100ML; G/100ML; G/100ML
INJECTION, SOLUTION INTRAVENOUS CONTINUOUS
Status: DISCONTINUED | OUTPATIENT
Start: 2022-02-01 | End: 2022-02-02 | Stop reason: HOSPADM

## 2022-02-01 RX ORDER — AMMONIA INHALANTS 0.04 G/.3ML
0.3 INHALANT RESPIRATORY (INHALATION) AS NEEDED
Status: DISCONTINUED | OUTPATIENT
Start: 2022-02-01 | End: 2022-02-02 | Stop reason: HOSPADM

## 2022-02-02 ENCOUNTER — TELEPHONE (OUTPATIENT)
Dept: OBGYN CLINIC | Facility: CLINIC | Age: 42
End: 2022-02-02

## 2022-02-02 LAB
GLUCOSE BLDC GLUCOMTR-MCNC: 84 MG/DL (ref 70–99)
GLUCOSE BLDC GLUCOMTR-MCNC: 85 MG/DL (ref 70–99)
GLUCOSE BLDC GLUCOMTR-MCNC: 92 MG/DL (ref 70–99)
GLUCOSE BLDC GLUCOMTR-MCNC: 99 MG/DL (ref 70–99)

## 2022-02-02 PROCEDURE — 0KQM0ZZ REPAIR PERINEUM MUSCLE, OPEN APPROACH: ICD-10-PCS | Performed by: OBSTETRICS & GYNECOLOGY

## 2022-02-02 PROCEDURE — 59410 OBSTETRICAL CARE: CPT | Performed by: OBSTETRICS & GYNECOLOGY

## 2022-02-02 RX ORDER — CARBOPROST TROMETHAMINE 250 UG/ML
INJECTION, SOLUTION INTRAMUSCULAR
Status: DISCONTINUED
Start: 2022-02-02 | End: 2022-02-02 | Stop reason: WASHOUT

## 2022-02-02 RX ORDER — CEFAZOLIN SODIUM/WATER 2 G/20 ML
2 SYRINGE (ML) INTRAVENOUS ONCE
Status: COMPLETED | OUTPATIENT
Start: 2022-02-02 | End: 2022-02-02

## 2022-02-02 RX ORDER — TRANEXAMIC ACID 10 MG/ML
INJECTION, SOLUTION INTRAVENOUS
Status: DISCONTINUED
Start: 2022-02-02 | End: 2022-02-02 | Stop reason: WASHOUT

## 2022-02-02 RX ORDER — LIDOCAINE HYDROCHLORIDE 10 MG/ML
INJECTION, SOLUTION EPIDURAL; INFILTRATION; INTRACAUDAL; PERINEURAL
Status: COMPLETED
Start: 2022-02-02 | End: 2022-02-02

## 2022-02-02 RX ORDER — METHYLERGONOVINE MALEATE 0.2 MG/ML
0.2 INJECTION INTRAVENOUS ONCE
Status: COMPLETED | OUTPATIENT
Start: 2022-02-02 | End: 2022-02-02

## 2022-02-02 RX ORDER — METHYLERGONOVINE MALEATE 0.2 MG/ML
INJECTION INTRAVENOUS
Status: COMPLETED
Start: 2022-02-02 | End: 2022-02-02

## 2022-02-02 RX ORDER — MISOPROSTOL 200 UG/1
TABLET ORAL
Status: DISPENSED
Start: 2022-02-02 | End: 2022-02-02

## 2022-02-02 NOTE — TELEPHONE ENCOUNTER
On-call ([facility] 22)    Jayda Monreal MD routed conversation to [facility] Clinical Staff      Noted. Pt does not have any future PN appts to cancel.

## 2022-02-02 NOTE — PROGRESS NOTES
Patient up to bathroom with assist x 2. Voided 450cc. Patient transferred to mother/baby room 354 per wheelchair in stable condition with baby and personal belongings. Accompanied by significant other and staff. Report given to mother/baby RN, Mily Carbajal.

## 2022-02-02 NOTE — L&D DELIVERY NOTE
[facility]    Vaginal Delivery Note    Cristian Hirsch Patient Status:  Inpatient    3/3/1980 MRN [id number]   Location [address] Attending Fany Witt MD   Hosp Day # 1 PCP None Pcp     Delivery     Infant Info:  Date of Delivery: 2022, Time of Delivery: 10:31 AM, Delivery Type: Normal spontaneous vaginal delivery    Delivery Narrative: Patient pushed for less than 5 minutes prior to delivering a live male infant Apgars 8 at one minute, 9 at five minutes weight 6# 11 oz over intact perineum in EVANS position. Loose nuchal cord reduced Infant was bulb suctioned prior to delivering in toto. Cord doubly clamped & cut after 30 seconds. Infant handed to awaiting mother. Second degree midline perineal laceration repaired with 2-0 Vicryl in normal usual fashion. Left periuretheral tear repaired w/ 3-0 chromic. No sulcus nor cervical lacerations noted -- cervical abrasion stitched w/ 3-0 chromic. Placenta delivered spontaneously, intact and normal in appearance with 3 vessel cord.   Mother in stable condition.  (+) PPH -- responded to increased pitocen / methergine / uterine exploration w/ removal of small amount membranes -- RN informed me later that pitocen was not infusion -- leaking on floor    Maternal Anesthesia: IV sedation     Delivery Complications:  none    Neonatologist Present: no    Placenta info:  Date/Time of Delivery: 2022 10:55 AM   Delivery: spontaneous  Placenta to Pathology: no    Cord info:  Cord Gases Submitted: no  Cord Blood Collection: no  Cord Tissue Collection: no  Cord Complications: single nuchal    Sponge and Needle Counts:  Verified    Estimated Blood Loss (mL)  350 cc      Ramses Gilliland MD   2022  11:38 AM

## 2022-02-03 LAB
BASOPHILS # BLD AUTO: 0.07 X10(3) UL (ref 0–0.2)
BASOPHILS NFR BLD AUTO: 0.4 %
DEPRECATED RDW RBC AUTO: 52.8 FL (ref 35.1–46.3)
EOSINOPHIL # BLD AUTO: 0.03 X10(3) UL (ref 0–0.7)
EOSINOPHIL NFR BLD AUTO: 0.2 %
ERYTHROCYTE [DISTWIDTH] IN BLOOD BY AUTOMATED COUNT: 15.2 % (ref 11–15)
HCT VFR BLD AUTO: 30.4 %
HGB BLD-MCNC: 9.9 G/DL
IMM GRANULOCYTES # BLD AUTO: 0.11 X10(3) UL (ref 0–1)
IMM GRANULOCYTES NFR BLD: 0.6 %
LYMPHOCYTES # BLD AUTO: 2.12 X10(3) UL (ref 1–4)
LYMPHOCYTES NFR BLD AUTO: 12.3 %
MCH RBC QN AUTO: 31.1 PG (ref 26–34)
MCHC RBC AUTO-ENTMCNC: 32.6 G/DL (ref 31–37)
MCV RBC AUTO: 95.6 FL
MONOCYTES # BLD AUTO: 1.18 X10(3) UL (ref 0.1–1)
MONOCYTES NFR BLD AUTO: 6.8 %
NEUTROPHILS # BLD AUTO: 13.74 X10 (3) UL (ref 1.5–7.7)
NEUTROPHILS # BLD AUTO: 13.74 X10(3) UL (ref 1.5–7.7)
NEUTROPHILS NFR BLD AUTO: 79.7 %
PLATELET # BLD AUTO: 148 10(3)UL (ref 150–450)
RBC # BLD AUTO: 3.18 X10(6)UL
WBC # BLD AUTO: 17.3 X10(3) UL (ref 4–11)

## 2022-02-03 RX ORDER — ONDANSETRON 2 MG/ML
4 INJECTION INTRAMUSCULAR; INTRAVENOUS EVERY 6 HOURS PRN
Status: DISCONTINUED | OUTPATIENT
Start: 2022-02-03 | End: 2022-02-03

## 2022-02-03 RX ORDER — IBUPROFEN 600 MG/1
600 TABLET ORAL EVERY 6 HOURS
Status: DISCONTINUED | OUTPATIENT
Start: 2022-02-03 | End: 2022-02-03

## 2022-02-03 RX ORDER — DOCUSATE SODIUM 100 MG/1
100 CAPSULE, LIQUID FILLED ORAL
Status: DISCONTINUED | OUTPATIENT
Start: 2022-02-03 | End: 2022-02-03

## 2022-02-03 RX ORDER — CHOLECALCIFEROL (VITAMIN D3) 25 MCG
1 TABLET,CHEWABLE ORAL DAILY
Status: DISCONTINUED | OUTPATIENT
Start: 2022-02-03 | End: 2022-02-04

## 2022-02-03 RX ORDER — ACETAMINOPHEN 325 MG/1
650 TABLET ORAL EVERY 6 HOURS PRN
Status: DISCONTINUED | OUTPATIENT
Start: 2022-02-03 | End: 2022-02-04

## 2022-02-03 RX ORDER — ACETAMINOPHEN 325 MG/1
650 TABLET ORAL EVERY 6 HOURS PRN
Status: DISCONTINUED | OUTPATIENT
Start: 2022-02-03 | End: 2022-02-03

## 2022-02-03 RX ORDER — DIAPER,BRIEF,INFANT-TODD,DISP
1 EACH MISCELLANEOUS EVERY 6 HOURS PRN
Status: DISCONTINUED | OUTPATIENT
Start: 2022-02-03 | End: 2022-02-03

## 2022-02-03 RX ORDER — SIMETHICONE 80 MG
80 TABLET,CHEWABLE ORAL 3 TIMES DAILY PRN
Status: DISCONTINUED | OUTPATIENT
Start: 2022-02-03 | End: 2022-02-04

## 2022-02-03 RX ORDER — BISACODYL 10 MG
10 SUPPOSITORY, RECTAL RECTAL ONCE AS NEEDED
Status: DISCONTINUED | OUTPATIENT
Start: 2022-02-03 | End: 2022-02-03

## 2022-02-03 RX ORDER — IBUPROFEN 600 MG/1
600 TABLET ORAL EVERY 6 HOURS
Status: DISCONTINUED | OUTPATIENT
Start: 2022-02-03 | End: 2022-02-04

## 2022-02-03 RX ORDER — ONDANSETRON 2 MG/ML
4 INJECTION INTRAMUSCULAR; INTRAVENOUS EVERY 6 HOURS PRN
Status: DISCONTINUED | OUTPATIENT
Start: 2022-02-03 | End: 2022-02-04

## 2022-02-03 RX ORDER — AMMONIA INHALANTS 0.04 G/.3ML
0.3 INHALANT RESPIRATORY (INHALATION) AS NEEDED
Status: DISCONTINUED | OUTPATIENT
Start: 2022-02-03 | End: 2022-02-04

## 2022-02-03 RX ORDER — SIMETHICONE 80 MG
80 TABLET,CHEWABLE ORAL 3 TIMES DAILY PRN
Status: DISCONTINUED | OUTPATIENT
Start: 2022-02-03 | End: 2022-02-03

## 2022-02-03 RX ORDER — BISACODYL 10 MG
10 SUPPOSITORY, RECTAL RECTAL ONCE AS NEEDED
Status: DISCONTINUED | OUTPATIENT
Start: 2022-02-03 | End: 2022-02-04

## 2022-02-03 RX ORDER — DOCUSATE SODIUM 100 MG/1
100 CAPSULE, LIQUID FILLED ORAL
Status: DISCONTINUED | OUTPATIENT
Start: 2022-02-03 | End: 2022-02-04

## 2022-02-03 RX ORDER — DIAPER,BRIEF,INFANT-TODD,DISP
1 EACH MISCELLANEOUS EVERY 6 HOURS PRN
Status: DISCONTINUED | OUTPATIENT
Start: 2022-02-03 | End: 2022-02-04

## 2022-02-03 NOTE — LACTATION NOTE
Mom states that infant is breastfeeding well and denies questions/concerns at this time. Encouraged to call for assistance, as needed.

## 2022-02-03 NOTE — PLAN OF CARE

## 2022-02-03 NOTE — LACTATION NOTE
This note was copied from a baby's chart. LACTATION NOTE - INFANT    Evaluation Type  Evaluation Type: Inpatient    Problems & Assessment  Problems: comment/detail: IDM  Infant Assessment: Anterior fontanel soft and flat;Skin color: pink or appropriate for ethnicity;Hunger cues present  Muscle tone: Appropriate for GA;Jittery; Hypertonic (RN in the room)    Feeding Assessment  Summary Current Feeding: Adlib;Breastfeeding exclusively  Breastfeeding Assessment: Assisted with breastfeeding w/mother's permission;Sustained nutrititive latch w/audible swallows; Coordinated suck/swallow;Deep latch achieved and observed  Breastfeeding Positions: football;laid back;right breast;left breast  Latch: Grasps breast, tongue down, lips flanged, rhythmic sucking  Audible Sucks/Swallows: Spontaneous and intermittent (24 hours old)  Type of Nipple: Everted (after stimulation)  Comfort (Breast/Nipple): Filling, red/small blisters/bruises, mild/mod discomfort  Hold (Positioning): Full assist, teach one side, mother does other, staff holds  [facility] Score: 8  Other (comment): Infant quite frantic at the breast initially, but finally calmed down and was able to achieve a deep latch with sustained active sucking and swallowing. Reviewed normal  breastfeeding behavior, deep latch techniques, and how to assess adequate milk transfer at the breast. Taught hand expression; mom did return demo. Instructed to hand express and spoon feed expressed colostrum if infant not latching. Mom encouraged to call for breastfeeding assistance, as needed.

## 2022-02-03 NOTE — LACTATION NOTE
This note was copied from a baby's chart. Infant out of room for circumcision. Mom states that he has been breastfeeding well. Denies questions/concerns at this time.

## 2022-02-03 NOTE — PROGRESS NOTES
Wants circ for son. Risks reviewed including bleeding, infection, injury and need for additional procedures all reviewed. Pt wishes to proceed.

## 2022-02-03 NOTE — LACTATION NOTE
This note was copied from a baby's chart. LACTATION NOTE - INFANT    Evaluation Type  Evaluation Type: Inpatient    Problems & Assessment  Problems Diagnosed or Identified: Latch difficulty  Problems: comment/detail: IDM  Infant Assessment: Anterior fontanel soft and flat;Skin color: pink or appropriate for ethnicity;Hunger cues present  Muscle tone: Appropriate for GA    Feeding Assessment  Summary Current Feeding: Adlib;Breastfeeding exclusively  Breastfeeding Assessment: Assisted with breastfeeding w/mother's permission;Calm and ready to breastfeed;Coordinated suck/swallow;Deep latch achieved and observed; Tolerated feeding well  Breastfeeding Positions: left breast  Latch: Grasps breast, tongue down, lips flanged, rhythmic sucking  Audible Sucks/Swallows: Spontaneous and intermittent (24 hours old)  Type of Nipple: Everted (after stimulation)  Comfort (Breast/Nipple): Soft/non-tender  Hold (Positioning): Full assist, teach one side, mother does other, staff holds  [facility] Score: 9

## 2022-02-03 NOTE — LACTATION NOTE
LACTATION NOTE - MOTHER      Evaluation Type: Inpatient    Problems identified  Problems identified: Knowledge deficit    Maternal history  Maternal history: AMA; Anxiety;Diabetes Mellitus; Gestational diabetes; Induction of labor    Breastfeeding goal  Breastfeeding goal: To maintain breast milk feeding per patient goal    Maternal Assessment  Bilateral Breasts: Soft  Bilateral Nipples: WNL  Right Nipple: Colostrum difficult to express  Right Areola: Bruising  Left Nipple: Colostrum easily expressed  Prior breastfeeding experience (comment below): Primip  Breastfeeding Assistance: Breastfeeding assistance provided with permission    Pain assessment  Pain, additional: Pain location;Pain w/initial sucks only  Pain Location: Nipples  Location/Comment: pinching resolved with deep latch techniques. Treatment of Sore Nipples: Deeper latch techniques    Guidelines for use of:  Suggested use of pump: Pump each time a supplement is offered  Other (comment): Reviewed normal  breastfeeding behavior, deep latch techniques, and how to assess adequate milk transfer at the breast. Taught hand expression; mom did return demo. Instructed to hand express and spoon feed expressed colostrum if infant not latching. Mom encouraged to call for breastfeeding assistance, as needed.

## 2022-02-04 VITALS
HEART RATE: 67 BPM | BODY MASS INDEX: 34.93 KG/M2 | HEIGHT: 61 IN | RESPIRATION RATE: 16 BRPM | OXYGEN SATURATION: 97 % | TEMPERATURE: 99 F | WEIGHT: 185 LBS | DIASTOLIC BLOOD PRESSURE: 74 MMHG | SYSTOLIC BLOOD PRESSURE: 135 MMHG

## 2022-02-04 RX ORDER — IBUPROFEN 600 MG/1
600 TABLET ORAL EVERY 6 HOURS PRN
Qty: 20 TABLET | Refills: 0 | Status: SHIPPED | OUTPATIENT
Start: 2022-02-04 | End: 2022-03-23

## 2022-02-04 RX ORDER — PSEUDOEPHEDRINE HCL 30 MG
100 TABLET ORAL 2 TIMES DAILY PRN
Qty: 30 CAPSULE | Refills: 0 | Status: SHIPPED | OUTPATIENT
Start: 2022-02-04 | End: 2022-03-23

## 2022-02-04 NOTE — LACTATION NOTE
This note was copied from a baby's chart. LACTATION NOTE - INFANT    Evaluation Type  Evaluation Type: Inpatient    Problems & Assessment  Problems Diagnosed or Identified: Sleepy  Infant Assessment: Hunger cues present;Skin color: pink or appropriate for ethnicity  Muscle tone: Appropriate for GA    Feeding Assessment  Summary Current Feeding: Adlib;Breastfeeding exclusively  Breastfeeding Assessment: Calm and ready to breastfeed;Assisted with breastfeeding w/mother's permission;Deep latch achieved and observed; Coordinated suck/swallow  Breastfeeding Positions: left breast;sidelying  Latch: Repeated attempts, hold nipple in mouth, stimulate to suck  Audible Sucks/Swallows: Spontaneous and intermittent (24 hours old)  Type of Nipple: Everted (after stimulation)  Comfort (Breast/Nipple): Soft/non-tender  Hold (Positioning): No assist from staff, mother able to position/hold infant  LATCH Score: 9  Other (comment): Needs some stimulation to continue nursing, but then nurses well and has deep latch

## 2022-02-04 NOTE — PLAN OF CARE
Problem: BIRTH - VAGINAL/ SECTION  Goal: Fetal and maternal status remain reassuring during the birth process  Description: INTERVENTIONS:  - Monitor vital signs  - Monitor fetal heart rate  - Monitor uterine activity  - Monitor labor progression (vaginal delivery)  - DVT prophylaxis (C/S delivery)  - Surgical antibiotic prophylaxis (C/S delivery)  Outcome: Progressing     Problem: PAIN - ADULT  Goal: Verbalizes/displays adequate comfort level or patient's stated pain goal  Description: INTERVENTIONS:  - Encourage pt to monitor pain and request assistance  - Assess pain using appropriate pain scale  - Administer analgesics based on type and severity of pain and evaluate response  - Implement non-pharmacological measures as appropriate and evaluate response  - Consider cultural and social influences on pain and pain management  - Manage/alleviate anxiety  - Utilize distraction and/or relaxation techniques  - Monitor for opioid side effects  - Notify MD/LIP if interventions unsuccessful or patient reports new pain  - Anticipate increased pain with activity and pre-medicate as appropriate  Outcome: Progressing     Problem: ANXIETY  Goal: Will report anxiety at manageable levels  Description: INTERVENTIONS:  - Administer medication as ordered  - Teach and rehearse alternative coping skills  - Provide emotional support with 1:1 interaction with staff  Outcome: Progressing     Problem: Patient Centered Care  Goal: Patient preferences are identified and integrated in the patient's plan of care  Description: Interventions:  - What would you like us to know as we care for you?   - Provide timely, complete, and accurate information to patient/family  - Incorporate patient and family knowledge, values, beliefs, and cultural backgrounds into the planning and delivery of care  - Encourage patient/family to participate in care and decision-making at the level they choose  - Honor patient and family perspectives and choices  Outcome: Progressing     Problem: POSTPARTUM  Goal: Long Term Goal:Experiences normal postpartum course  Description: INTERVENTIONS:  - Assess and monitor vital signs and lab values. - Assess fundus and lochia. - Provide ice/sitz baths for perineum discomfort. - Monitor healing of incision/episiotomy/laceration, and assess for signs and symptoms of infection and hematoma. - Assess bladder function and monitor for bladder distention.  - Provide/instruct/assist with pericare as needed. - Provide VTE prophylaxis as needed. - Monitor bowel function.  - Encourage ambulation and provide assistance as needed. - Assess and monitor emotional status and provide social service/psych resources as needed. - Utilize standard precautions and use personal protective equipment as indicated. Ensure aseptic care of all intravenous lines and invasive tubes/drains.  - Obtain immunization and exposure to communicable diseases history. Outcome: Progressing  Goal: Optimize infant feeding at the breast  Description: INTERVENTIONS:  - Initiate breast feeding within first hour after birth. - Monitor effectiveness of current breast feeding efforts. - Assess support systems available to mother/family.  - Identify cultural beliefs/practices regarding lactation, letdown techniques, maternal food preferences. - Assess mother's knowledge and previous experience with breast feeding.  - Provide information as needed about early infant feeding cues (e.g., rooting, lip smacking, sucking fingers/hand) versus late cue of crying.  - Discuss/demonstrate breast feeding aids (e.g., infant sling, nursing footstool/pillows, and breast pumps). - Encourage mother/other family members to express feelings/concerns, and actively listen. - Educate father/SO about benefits of breast feeding and how to manage common lactation challenges.   - Recommend avoidance of specific medications or substances incompatible with breast feeding.  - Assess and monitor for signs of nipple pain/trauma. - Instruct and provide assistance with proper latch. - Review techniques for milk expression (breast pumping) and storage of breast milk. Provide pumping equipment/supplies, instructions and assistance, as needed. - Encourage rooming-in and breast feeding on demand.  - Encourage skin-to-skin contact. - Provide LC support as needed. - Assess for and manage engorgement. - Provide breast feeding education handouts and information on community breast feeding support. Outcome: Progressing  Goal: Establishment of adequate milk supply with medication/procedure interruptions  Description: INTERVENTIONS:  - Review techniques for milk expression (breast pumping). - Provide pumping equipment/supplies, instructions, and assistance until it is safe to breastfeed infant. Outcome: Progressing  Goal: Experiences normal breast weaning course  Description: INTERVENTIONS:  - Assess for and manage engorgement. - Instruct on breast care. - Provide comfort measures. Outcome: Progressing  Goal: Appropriate maternal -  bonding  Description: INTERVENTIONS:  - Assess caregiver- interactions. - Assess caregiver's emotional status and coping mechanisms. - Encourage caregiver to participate in  daily care. - Assess support systems available to mother/family.  - Provide /case management support as needed.   Outcome: Progressing

## 2022-02-04 NOTE — LACTATION NOTE
LACTATION NOTE - MOTHER      Evaluation Type: Inpatient    Problems identified  Problems identified: Knowledge deficit    Maternal history  Maternal history: Diabetes Mellitus;AMA; Anxiety; Gestational diabetes; Induction of labor    Breastfeeding goal  Breastfeeding goal: To maintain breast milk feeding per patient goal    Maternal Assessment  Bilateral Breasts: Soft;Symmetrical  Bilateral Nipples: WNL  Right Areola: Bruising  Prior breastfeeding experience (comment below): Primip  Breastfeeding Assistance: Breastfeeding assistance provided with permission    Pain assessment  Location/Comment: denies    Guidelines for use of: Other (comment): Infant currently latched and nursing in side lying position, needs some stimulation to continue feeding. Reviewed discharge information and OP clinic.

## 2022-02-04 NOTE — PLAN OF CARE
FOCUS: MOM DISCHARGE    D: DISCHARGE ORDER RECEIVED FROM MD    A: POSTPARTUM DISCHARGE FOLDER GIVEN, DISCHARGE MEDICATION FORM REVIEWED, SIGNED AND GIVEN TO PATIENT. ID BAND MATCHED WITH INFANT BAND. PATIENT INFORMED WHEN TO MAKE FOLLOW UP APPOINTMENT WITH OB    R: MOTHER IS INTERACTING APPROPRIATELY WITH INFANT.  VERBALIZED UNDERSTANDING OF FOLLOW UP INSTRUCTIONS. DISCHARGED IN STABLE VIE WHEELCHAIR.

## 2022-02-06 ENCOUNTER — NST DOCUMENTATION (OUTPATIENT)
Dept: OBGYN CLINIC | Facility: CLINIC | Age: 42
End: 2022-02-06

## 2022-02-06 NOTE — NST
Nonstress Test   Patient: Varsha Clark    Gestation: 39w0d    Diagnosis from order: Pregestational diabetes mellitus, modified White class B       NST:         NST DOCUMENTATION 2022   Variability -   Accelerations -   Decelerations -   Baseline -   Uterine Irritability -   Contractions -   Acoustic Stimulator -   Nonstress Test Interpretation -   Nonstress Test Second Interpretation -   Comments -   NST Completed by -   Disposition -    Testing Plan -   Provider Notified macduff DO         I agree with the above evaluation. NST completed. Mallika Denton MD  2022  9:15 AM

## 2022-02-06 NOTE — NST
Nonstress Test   Patient: Shannan Friedman    Gestation: 39w0d    Diagnosis from order: Pregestational diabetes mellitus, modified White class B       NST:         NST DOCUMENTATION 2022   Variability -   Accelerations -   Decelerations -   Baseline -   Uterine Irritability -   Contractions -   Acoustic Stimulator -   Nonstress Test Interpretation -   Nonstress Test Second Interpretation -   Comments -   NST Completed by -   Disposition -    Testing Plan -   Provider Notified macduff DO         I agree with the above evaluation. NST completed. Mallika Denton MD  2022  9:26 AM

## 2022-02-24 ENCOUNTER — TELEPHONE (OUTPATIENT)
Dept: OBGYN UNIT | Facility: HOSPITAL | Age: 42
End: 2022-02-24

## 2022-02-27 ENCOUNTER — NST DOCUMENTATION (OUTPATIENT)
Dept: OBGYN CLINIC | Facility: CLINIC | Age: 42
End: 2022-02-27

## 2022-02-27 NOTE — NST
Nonstress Test   Patient: Raul Abt    [birth year]    Diagnosis from order: Pregestational diabetes mellitus, modified White class B       NST: 135/mod/R        Rose Rey DO  2/27/2022  2:57 PM

## 2022-02-27 NOTE — NST
Nonstress Test   Patient: Yobany Notice    [birth year]    Diagnosis from order: Pregestational diabetes mellitus, modified White class B       NST: 130/mod/R    Alveda Leora, DO  2/27/2022  3:30 PM

## 2022-03-23 ENCOUNTER — POSTPARTUM (OUTPATIENT)
Dept: OBGYN CLINIC | Facility: CLINIC | Age: 42
End: 2022-03-23
Payer: COMMERCIAL

## 2022-03-23 VITALS
WEIGHT: 168.19 LBS | DIASTOLIC BLOOD PRESSURE: 66 MMHG | SYSTOLIC BLOOD PRESSURE: 101 MMHG | BODY MASS INDEX: 32 KG/M2 | HEART RATE: 76 BPM

## 2022-03-23 PROBLEM — Z34.90 PREGNANCY: Status: RESOLVED | Noted: 2022-02-01 | Resolved: 2022-03-23

## 2022-03-23 PROBLEM — Z34.90 PREGNANCY (HCC): Status: RESOLVED | Noted: 2022-02-01 | Resolved: 2022-03-23

## 2022-03-23 PROBLEM — O24.319 PREGESTATIONAL DIABETES MELLITUS, MODIFIED WHITE CLASS B: Status: RESOLVED | Noted: 2021-09-22 | Resolved: 2022-03-23

## 2022-03-23 PROBLEM — O24.319 PREGESTATIONAL DIABETES MELLITUS, MODIFIED WHITE CLASS B (HCC): Status: RESOLVED | Noted: 2021-09-22 | Resolved: 2022-03-23

## 2022-03-23 PROCEDURE — 3074F SYST BP LT 130 MM HG: CPT | Performed by: OBSTETRICS & GYNECOLOGY

## 2022-03-23 PROCEDURE — 3078F DIAST BP <80 MM HG: CPT | Performed by: OBSTETRICS & GYNECOLOGY

## 2022-03-24 ENCOUNTER — NST DOCUMENTATION (OUTPATIENT)
Dept: OBGYN CLINIC | Facility: CLINIC | Age: 42
End: 2022-03-24

## 2022-03-25 NOTE — NST
Nonstress Test   Patient: Ilan Alexander    1/[birth year]    NST reported as reactive per MFM RN    NST equivocal; pt has since delivered    Jock Neel, DO  3/24/2022  9:30 PM

## 2022-04-04 ENCOUNTER — LABORATORY ENCOUNTER (OUTPATIENT)
Dept: LAB | Facility: HOSPITAL | Age: 42
End: 2022-04-04
Attending: OBSTETRICS & GYNECOLOGY
Payer: COMMERCIAL

## 2022-04-04 LAB
GLUCOSE 1H P GLC SERPL-MCNC: 83 MG/DL
GLUCOSE P FAST SERPL-MCNC: 78 MG/DL (ref 70–99)

## 2022-04-04 PROCEDURE — 36415 COLL VENOUS BLD VENIPUNCTURE: CPT

## 2022-04-04 PROCEDURE — 82951 GLUCOSE TOLERANCE TEST (GTT): CPT

## 2022-08-30 ENCOUNTER — LAB ENCOUNTER (OUTPATIENT)
Dept: LAB | Facility: HOSPITAL | Age: 42
End: 2022-08-30
Attending: FAMILY MEDICINE
Payer: COMMERCIAL

## 2022-08-30 ENCOUNTER — OFFICE VISIT (OUTPATIENT)
Dept: INTERNAL MEDICINE CLINIC | Facility: CLINIC | Age: 42
End: 2022-08-30
Payer: COMMERCIAL

## 2022-08-30 VITALS
SYSTOLIC BLOOD PRESSURE: 110 MMHG | DIASTOLIC BLOOD PRESSURE: 70 MMHG | BODY MASS INDEX: 32.1 KG/M2 | WEIGHT: 170 LBS | HEIGHT: 61 IN | TEMPERATURE: 98 F | HEART RATE: 70 BPM | OXYGEN SATURATION: 99 %

## 2022-08-30 DIAGNOSIS — Z82.41 FAMILY HISTORY OF SUDDEN CARDIAC DEATH: ICD-10-CM

## 2022-08-30 DIAGNOSIS — L65.9 HAIR THINNING: ICD-10-CM

## 2022-08-30 DIAGNOSIS — D22.9 ATYPICAL NEVUS: ICD-10-CM

## 2022-08-30 DIAGNOSIS — Z00.00 WELLNESS EXAMINATION: ICD-10-CM

## 2022-08-30 DIAGNOSIS — Z12.31 ENCOUNTER FOR SCREENING MAMMOGRAM FOR MALIGNANT NEOPLASM OF BREAST: ICD-10-CM

## 2022-08-30 DIAGNOSIS — J45.20 MILD INTERMITTENT ASTHMA WITHOUT COMPLICATION: ICD-10-CM

## 2022-08-30 DIAGNOSIS — Z00.00 WELLNESS EXAMINATION: Primary | ICD-10-CM

## 2022-08-30 LAB
ALBUMIN SERPL-MCNC: 3.7 G/DL (ref 3.4–5)
ALBUMIN/GLOB SERPL: 1 {RATIO} (ref 1–2)
ALP LIVER SERPL-CCNC: 71 U/L
ALT SERPL-CCNC: 26 U/L
ANION GAP SERPL CALC-SCNC: 6 MMOL/L (ref 0–18)
AST SERPL-CCNC: 12 U/L (ref 15–37)
BASOPHILS # BLD AUTO: 0.02 X10(3) UL (ref 0–0.2)
BASOPHILS NFR BLD AUTO: 0.3 %
BILIRUB SERPL-MCNC: 0.5 MG/DL (ref 0.1–2)
BUN BLD-MCNC: 19 MG/DL (ref 7–18)
BUN/CREAT SERPL: 27.1 (ref 10–20)
CALCIUM BLD-MCNC: 9.3 MG/DL (ref 8.5–10.1)
CHLORIDE SERPL-SCNC: 107 MMOL/L (ref 98–112)
CHOLEST SERPL-MCNC: 227 MG/DL (ref ?–200)
CO2 SERPL-SCNC: 26 MMOL/L (ref 21–32)
CREAT BLD-MCNC: 0.7 MG/DL
DEPRECATED RDW RBC AUTO: 49.1 FL (ref 35.1–46.3)
EOSINOPHIL # BLD AUTO: 0.09 X10(3) UL (ref 0–0.7)
EOSINOPHIL NFR BLD AUTO: 1.3 %
ERYTHROCYTE [DISTWIDTH] IN BLOOD BY AUTOMATED COUNT: 14.5 % (ref 11–15)
EST. AVERAGE GLUCOSE BLD GHB EST-MCNC: 103 MG/DL (ref 68–126)
FASTING PATIENT LIPID ANSWER: YES
FASTING STATUS PATIENT QL REPORTED: YES
GFR SERPLBLD BASED ON 1.73 SQ M-ARVRAT: 112 ML/MIN/1.73M2 (ref 60–?)
GLOBULIN PLAS-MCNC: 3.6 G/DL (ref 2.8–4.4)
GLUCOSE BLD-MCNC: 86 MG/DL (ref 70–99)
HBA1C MFR BLD: 5.2 % (ref ?–5.7)
HCT VFR BLD AUTO: 44.8 %
HDLC SERPL-MCNC: 81 MG/DL (ref 40–59)
HGB BLD-MCNC: 14.1 G/DL
IMM GRANULOCYTES # BLD AUTO: 0.01 X10(3) UL (ref 0–1)
IMM GRANULOCYTES NFR BLD: 0.1 %
LDLC SERPL CALC-MCNC: 132 MG/DL (ref ?–100)
LYMPHOCYTES # BLD AUTO: 2.37 X10(3) UL (ref 1–4)
LYMPHOCYTES NFR BLD AUTO: 35.1 %
MCH RBC QN AUTO: 28.9 PG (ref 26–34)
MCHC RBC AUTO-ENTMCNC: 31.5 G/DL (ref 31–37)
MCV RBC AUTO: 91.8 FL
MONOCYTES # BLD AUTO: 0.5 X10(3) UL (ref 0.1–1)
MONOCYTES NFR BLD AUTO: 7.4 %
NEUTROPHILS # BLD AUTO: 3.77 X10 (3) UL (ref 1.5–7.7)
NEUTROPHILS # BLD AUTO: 3.77 X10(3) UL (ref 1.5–7.7)
NEUTROPHILS NFR BLD AUTO: 55.8 %
NONHDLC SERPL-MCNC: 146 MG/DL (ref ?–130)
OSMOLALITY SERPL CALC.SUM OF ELEC: 290 MOSM/KG (ref 275–295)
PLATELET # BLD AUTO: 255 10(3)UL (ref 150–450)
POTASSIUM SERPL-SCNC: 4.5 MMOL/L (ref 3.5–5.1)
PROT SERPL-MCNC: 7.3 G/DL (ref 6.4–8.2)
RBC # BLD AUTO: 4.88 X10(6)UL
SODIUM SERPL-SCNC: 139 MMOL/L (ref 136–145)
TRIGL SERPL-MCNC: 82 MG/DL (ref 30–149)
TSI SER-ACNC: 1.41 MIU/ML (ref 0.36–3.74)
VLDLC SERPL CALC-MCNC: 15 MG/DL (ref 0–30)
WBC # BLD AUTO: 6.8 X10(3) UL (ref 4–11)

## 2022-08-30 PROCEDURE — 36415 COLL VENOUS BLD VENIPUNCTURE: CPT

## 2022-08-30 PROCEDURE — 80061 LIPID PANEL: CPT

## 2022-08-30 PROCEDURE — 80053 COMPREHEN METABOLIC PANEL: CPT

## 2022-08-30 PROCEDURE — 85025 COMPLETE CBC W/AUTO DIFF WBC: CPT | Performed by: FAMILY MEDICINE

## 2022-08-30 PROCEDURE — 3078F DIAST BP <80 MM HG: CPT | Performed by: FAMILY MEDICINE

## 2022-08-30 PROCEDURE — 84443 ASSAY THYROID STIM HORMONE: CPT

## 2022-08-30 PROCEDURE — 83036 HEMOGLOBIN GLYCOSYLATED A1C: CPT

## 2022-08-30 PROCEDURE — 3044F HG A1C LEVEL LT 7.0%: CPT | Performed by: FAMILY MEDICINE

## 2022-08-30 PROCEDURE — 99213 OFFICE O/P EST LOW 20 MIN: CPT | Performed by: FAMILY MEDICINE

## 2022-08-30 PROCEDURE — 3074F SYST BP LT 130 MM HG: CPT | Performed by: FAMILY MEDICINE

## 2022-08-30 PROCEDURE — 99396 PREV VISIT EST AGE 40-64: CPT | Performed by: FAMILY MEDICINE

## 2022-08-30 PROCEDURE — 3008F BODY MASS INDEX DOCD: CPT | Performed by: FAMILY MEDICINE

## 2022-08-30 RX ORDER — MONTELUKAST SODIUM 10 MG/1
10 TABLET ORAL NIGHTLY
COMMUNITY

## 2022-08-30 RX ORDER — BUDESONIDE 90 UG/1
AEROSOL, POWDER RESPIRATORY (INHALATION) 2 TIMES DAILY
COMMUNITY

## 2022-08-30 RX ORDER — ALBUTEROL SULFATE 90 UG/1
2 AEROSOL, METERED RESPIRATORY (INHALATION) EVERY 6 HOURS PRN
Qty: 1 EACH | Refills: 0 | Status: SHIPPED | OUTPATIENT
Start: 2022-08-30

## 2022-09-06 ENCOUNTER — OFFICE VISIT (OUTPATIENT)
Dept: DERMATOLOGY CLINIC | Facility: CLINIC | Age: 42
End: 2022-09-06
Payer: COMMERCIAL

## 2022-09-06 DIAGNOSIS — D22.9 MULTIPLE NEVI: ICD-10-CM

## 2022-09-06 DIAGNOSIS — Z12.83 SCREENING EXAM FOR SKIN CANCER: Primary | ICD-10-CM

## 2022-09-06 PROCEDURE — 99203 OFFICE O/P NEW LOW 30 MIN: CPT | Performed by: PHYSICIAN ASSISTANT

## 2022-09-23 ENCOUNTER — OFFICE VISIT (OUTPATIENT)
Dept: OBGYN CLINIC | Facility: CLINIC | Age: 42
End: 2022-09-23

## 2022-09-23 VITALS
DIASTOLIC BLOOD PRESSURE: 73 MMHG | HEART RATE: 69 BPM | SYSTOLIC BLOOD PRESSURE: 112 MMHG | BODY MASS INDEX: 32 KG/M2 | WEIGHT: 169.38 LBS

## 2022-09-23 DIAGNOSIS — Z01.419 ENCOUNTER FOR GYNECOLOGICAL EXAMINATION WITHOUT ABNORMAL FINDING: Primary | ICD-10-CM

## 2022-09-23 PROCEDURE — 99396 PREV VISIT EST AGE 40-64: CPT | Performed by: OBSTETRICS & GYNECOLOGY

## 2022-09-23 PROCEDURE — 3074F SYST BP LT 130 MM HG: CPT | Performed by: OBSTETRICS & GYNECOLOGY

## 2022-09-23 PROCEDURE — 3078F DIAST BP <80 MM HG: CPT | Performed by: OBSTETRICS & GYNECOLOGY

## 2022-10-11 ENCOUNTER — TELEPHONE (OUTPATIENT)
Dept: INTERNAL MEDICINE CLINIC | Facility: CLINIC | Age: 42
End: 2022-10-11

## 2022-10-13 ENCOUNTER — ORDER TRANSCRIPTION (OUTPATIENT)
Dept: ADMINISTRATIVE | Facility: HOSPITAL | Age: 42
End: 2022-10-13

## 2022-10-13 DIAGNOSIS — Z13.6 SCREENING FOR CARDIOVASCULAR CONDITION: Primary | ICD-10-CM

## 2022-10-31 ENCOUNTER — OFFICE VISIT (OUTPATIENT)
Dept: OBGYN CLINIC | Facility: CLINIC | Age: 42
End: 2022-10-31
Payer: COMMERCIAL

## 2022-10-31 VITALS
SYSTOLIC BLOOD PRESSURE: 111 MMHG | HEART RATE: 81 BPM | BODY MASS INDEX: 32 KG/M2 | DIASTOLIC BLOOD PRESSURE: 71 MMHG | WEIGHT: 171 LBS

## 2022-10-31 DIAGNOSIS — N89.8 VAGINAL LEUKORRHEA: Primary | ICD-10-CM

## 2022-10-31 PROCEDURE — 99212 OFFICE O/P EST SF 10 MIN: CPT | Performed by: OBSTETRICS & GYNECOLOGY

## 2022-10-31 PROCEDURE — 3074F SYST BP LT 130 MM HG: CPT | Performed by: OBSTETRICS & GYNECOLOGY

## 2022-10-31 PROCEDURE — 3078F DIAST BP <80 MM HG: CPT | Performed by: OBSTETRICS & GYNECOLOGY

## 2022-11-02 ENCOUNTER — OFFICE VISIT (OUTPATIENT)
Dept: FAMILY MEDICINE CLINIC | Facility: CLINIC | Age: 42
End: 2022-11-02
Payer: COMMERCIAL

## 2022-11-02 VITALS
SYSTOLIC BLOOD PRESSURE: 115 MMHG | WEIGHT: 173 LBS | BODY MASS INDEX: 32.66 KG/M2 | DIASTOLIC BLOOD PRESSURE: 70 MMHG | HEART RATE: 68 BPM | HEIGHT: 61 IN | OXYGEN SATURATION: 99 %

## 2022-11-02 DIAGNOSIS — R19.4 CHANGE IN BOWEL HABITS: ICD-10-CM

## 2022-11-02 DIAGNOSIS — K64.4 EXTERNAL HEMORRHOID: Primary | ICD-10-CM

## 2022-11-02 PROBLEM — E78.5 HYPERLIPIDEMIA WITH TARGET LOW DENSITY LIPOPROTEIN (LDL) CHOLESTEROL LESS THAN 100 MG/DL: Status: ACTIVE | Noted: 2022-10-13

## 2022-11-02 PROCEDURE — 3074F SYST BP LT 130 MM HG: CPT | Performed by: FAMILY MEDICINE

## 2022-11-02 PROCEDURE — 99213 OFFICE O/P EST LOW 20 MIN: CPT | Performed by: FAMILY MEDICINE

## 2022-11-02 PROCEDURE — 3008F BODY MASS INDEX DOCD: CPT | Performed by: FAMILY MEDICINE

## 2022-11-02 PROCEDURE — 3078F DIAST BP <80 MM HG: CPT | Performed by: FAMILY MEDICINE

## 2022-11-02 RX ORDER — HYDROCORTISONE 25 MG/G
1 CREAM TOPICAL 2 TIMES DAILY
Qty: 28 G | Refills: 0 | Status: SHIPPED | OUTPATIENT
Start: 2022-11-02

## 2022-11-17 ENCOUNTER — HOSPITAL ENCOUNTER (OUTPATIENT)
Dept: CT IMAGING | Age: 42
Discharge: HOME | End: 2022-11-17
Attending: INTERNAL MEDICINE

## 2022-11-17 DIAGNOSIS — Z13.6 SCREENING FOR CARDIOVASCULAR CONDITION: ICD-10-CM

## 2022-11-17 NOTE — PROGRESS NOTES
Date of Service 11/17/2022    Aristides Oliva  Date of Birth 3/3/1980    Patient Age: 38year old    PCP: Twyla Christie, [facility]    Consult Type  Type Scan/Screening: Heart Scan  Preliminary Heart Scan Score: 0                Body Mass Index  There is no height or weight on file to calculate BMI. Lipid Profile  Cholesterol: 227, done on 8/30/2022. HDL Cholesterol: 81, done on 8/30/2022. LDL Cholesterol: 132, done on 8/30/2022. TriGlycerides 82, done on 8/30/2022. Nurse Review  Risk factor information and results reviewed with Nurse: Yes    Recommended Follow Up:  Consult your physician regarding[de-identified] Final Heart Scan Report; Discuss potential for Incidental Finding    No data recorded      Recommendations for Change:  Nutrition Changes: Low Saturated Fat;Low Fat Dairy  Cholesterol Modification (goal of therapy depends upon your risk): Decrease LDL (Lousy/Bad) Ideal <100  Exercise: Enhance Current Program  Smoking Cessation: No Change Needed  Weight Management: Decrease Current Weight  Stress Management: Adopt Stress Management Techniques  Repeat Heart Scan: 5 years if Calcium Score is 0. 0; Discuss with your Physician          Samuel Recommended Resources:  Recommended Resources: Upcoming Classes, Medical Services and Health Library www. [employer] [facility]. Neil Newman RN        Please Contact the Nurse Heart Line with any Questions or Concerns [phone].

## 2022-11-21 ENCOUNTER — MED REC SCAN ONLY (OUTPATIENT)
Dept: FAMILY MEDICINE CLINIC | Facility: CLINIC | Age: 42
End: 2022-11-21

## 2022-11-25 ENCOUNTER — HOSPITAL ENCOUNTER (OUTPATIENT)
Age: 42
Discharge: HOME | End: 2022-11-25
Payer: COMMERCIAL

## 2022-11-25 VITALS
TEMPERATURE: 98 F | DIASTOLIC BLOOD PRESSURE: 61 MMHG | RESPIRATION RATE: 16 BRPM | OXYGEN SATURATION: 100 % | SYSTOLIC BLOOD PRESSURE: 119 MMHG | HEART RATE: 65 BPM

## 2022-11-25 DIAGNOSIS — N30.90 CYSTITIS: Primary | ICD-10-CM

## 2022-11-25 LAB
B-HCG UR QL: NEGATIVE
BILIRUB UR QL STRIP: NEGATIVE
COLOR UR: YELLOW
GLUCOSE UR STRIP-MCNC: NEGATIVE MG/DL
KETONES UR STRIP-MCNC: NEGATIVE MG/DL
NITRITE UR QL STRIP: NEGATIVE
PH UR STRIP: 7.5 [PH]
PROT UR STRIP-MCNC: NEGATIVE MG/DL
SP GR UR STRIP: 1.02
UROBILINOGEN UR STRIP-ACNC: <2 MG/DL

## 2022-11-25 RX ORDER — CEPHALEXIN 500 MG/1
500 CAPSULE ORAL 2 TIMES DAILY
Qty: 14 CAPSULE | Refills: 0 | Status: SHIPPED | OUTPATIENT
Start: 2022-11-25 | End: 2022-12-02

## 2022-11-26 NOTE — DISCHARGE INSTRUCTIONS
Make sure to stay well hydrated with clear fluids. Take the full course of antibiotics as prescribed, even if you begin to feel better. Follow up with your primary care provider within the next 2 days. Seek additional care in the ER for new or worsening symptoms, fever, abdominal pain, back pain, blood in your urine, or persistent vomiting/diarrhea.

## 2022-11-26 NOTE — ED INITIAL ASSESSMENT (HPI)
Patient complaints of burning with urination, frequent urination, pressure in the abdomen, back pain last night x 3days.

## 2022-11-30 ENCOUNTER — HOSPITAL ENCOUNTER (OUTPATIENT)
Age: 42
Discharge: ANOTHER HOSPITAL | End: 2022-11-30
Payer: COMMERCIAL

## 2022-11-30 ENCOUNTER — HOSPITAL ENCOUNTER (EMERGENCY)
Facility: HOSPITAL | Age: 42
Discharge: HOME | End: 2022-11-30
Attending: STUDENT IN AN ORGANIZED HEALTH CARE EDUCATION/TRAINING PROGRAM
Payer: COMMERCIAL

## 2022-11-30 ENCOUNTER — APPOINTMENT (OUTPATIENT)
Dept: CT IMAGING | Facility: HOSPITAL | Age: 42
End: 2022-11-30
Attending: STUDENT IN AN ORGANIZED HEALTH CARE EDUCATION/TRAINING PROGRAM
Payer: COMMERCIAL

## 2022-11-30 VITALS
DIASTOLIC BLOOD PRESSURE: 76 MMHG | OXYGEN SATURATION: 98 % | HEIGHT: 61 IN | WEIGHT: 170 LBS | BODY MASS INDEX: 32.1 KG/M2 | SYSTOLIC BLOOD PRESSURE: 122 MMHG | TEMPERATURE: 98 F | RESPIRATION RATE: 16 BRPM | HEART RATE: 88 BPM

## 2022-11-30 VITALS
RESPIRATION RATE: 18 BRPM | DIASTOLIC BLOOD PRESSURE: 77 MMHG | HEIGHT: 61 IN | WEIGHT: 170 LBS | BODY MASS INDEX: 32.1 KG/M2 | HEART RATE: 69 BPM | OXYGEN SATURATION: 100 % | SYSTOLIC BLOOD PRESSURE: 139 MMHG | TEMPERATURE: 98 F

## 2022-11-30 DIAGNOSIS — N20.0 BILATERAL RENAL STONES: ICD-10-CM

## 2022-11-30 DIAGNOSIS — N30.00 ACUTE CYSTITIS WITHOUT HEMATURIA: Primary | ICD-10-CM

## 2022-11-30 DIAGNOSIS — R10.31 ABDOMINAL PAIN, RIGHT LOWER QUADRANT: ICD-10-CM

## 2022-11-30 DIAGNOSIS — R10.9 RIGHT FLANK PAIN: Primary | ICD-10-CM

## 2022-11-30 LAB
ANION GAP SERPL CALC-SCNC: 5 MMOL/L (ref 0–18)
B-HCG UR QL: NEGATIVE
BASOPHILS # BLD AUTO: 0.06 X10(3) UL (ref 0–0.2)
BASOPHILS NFR BLD AUTO: 0.4 %
BILIRUB UR QL: NEGATIVE
BUN BLD-MCNC: 15 MG/DL (ref 7–18)
BUN/CREAT SERPL: 16.9 (ref 10–20)
CALCIUM BLD-MCNC: 9.5 MG/DL (ref 8.5–10.1)
CHLORIDE SERPL-SCNC: 106 MMOL/L (ref 98–112)
CO2 SERPL-SCNC: 27 MMOL/L (ref 21–32)
COLOR UR: YELLOW
CREAT BLD-MCNC: 0.89 MG/DL
DEPRECATED RDW RBC AUTO: 48.5 FL (ref 35.1–46.3)
EOSINOPHIL # BLD AUTO: 0 X10(3) UL (ref 0–0.7)
EOSINOPHIL NFR BLD AUTO: 0 %
ERYTHROCYTE [DISTWIDTH] IN BLOOD BY AUTOMATED COUNT: 14.2 % (ref 11–15)
GFR SERPLBLD BASED ON 1.73 SQ M-ARVRAT: 84 ML/MIN/1.73M2 (ref 60–?)
GLUCOSE BLD-MCNC: 101 MG/DL (ref 70–99)
GLUCOSE UR-MCNC: NEGATIVE MG/DL
HCT VFR BLD AUTO: 45.2 %
HGB BLD-MCNC: 14.3 G/DL
IMM GRANULOCYTES # BLD AUTO: 0.07 X10(3) UL (ref 0–1)
IMM GRANULOCYTES NFR BLD: 0.5 %
LEUKOCYTE ESTERASE UR QL STRIP.AUTO: NEGATIVE
LYMPHOCYTES # BLD AUTO: 0.77 X10(3) UL (ref 1–4)
LYMPHOCYTES NFR BLD AUTO: 5.3 %
MCH RBC QN AUTO: 29.1 PG (ref 26–34)
MCHC RBC AUTO-ENTMCNC: 31.6 G/DL (ref 31–37)
MCV RBC AUTO: 92.1 FL
MONOCYTES # BLD AUTO: 0.64 X10(3) UL (ref 0.1–1)
MONOCYTES NFR BLD AUTO: 4.4 %
NEUTROPHILS # BLD AUTO: 12.89 X10 (3) UL (ref 1.5–7.7)
NEUTROPHILS # BLD AUTO: 12.89 X10(3) UL (ref 1.5–7.7)
NEUTROPHILS NFR BLD AUTO: 89.4 %
NITRITE UR QL STRIP.AUTO: NEGATIVE
OSMOLALITY SERPL CALC.SUM OF ELEC: 287 MOSM/KG (ref 275–295)
PH UR: 5.5 [PH] (ref 5–8)
PLATELET # BLD AUTO: 270 10(3)UL (ref 150–450)
POTASSIUM SERPL-SCNC: 3.9 MMOL/L (ref 3.5–5.1)
RBC # BLD AUTO: 4.91 X10(6)UL
RBC #/AREA URNS AUTO: >10 /HPF
RBC #/AREA URNS AUTO: >10 /HPF
SODIUM SERPL-SCNC: 138 MMOL/L (ref 136–145)
SP GR UR STRIP: 1.02 (ref 1–1.03)
UROBILINOGEN UR STRIP-ACNC: 0.2
WBC # BLD AUTO: 14.4 X10(3) UL (ref 4–11)

## 2022-11-30 PROCEDURE — 81001 URINALYSIS AUTO W/SCOPE: CPT | Performed by: STUDENT IN AN ORGANIZED HEALTH CARE EDUCATION/TRAINING PROGRAM

## 2022-11-30 PROCEDURE — 80048 BASIC METABOLIC PNL TOTAL CA: CPT | Performed by: STUDENT IN AN ORGANIZED HEALTH CARE EDUCATION/TRAINING PROGRAM

## 2022-11-30 PROCEDURE — 99214 OFFICE O/P EST MOD 30 MIN: CPT | Performed by: NURSE PRACTITIONER

## 2022-11-30 PROCEDURE — 80048 BASIC METABOLIC PNL TOTAL CA: CPT

## 2022-11-30 PROCEDURE — 74176 CT ABD & PELVIS W/O CONTRAST: CPT | Performed by: STUDENT IN AN ORGANIZED HEALTH CARE EDUCATION/TRAINING PROGRAM

## 2022-11-30 PROCEDURE — 85025 COMPLETE CBC W/AUTO DIFF WBC: CPT

## 2022-11-30 PROCEDURE — 99284 EMERGENCY DEPT VISIT MOD MDM: CPT

## 2022-11-30 PROCEDURE — 81015 MICROSCOPIC EXAM OF URINE: CPT

## 2022-11-30 PROCEDURE — 81025 URINE PREGNANCY TEST: CPT

## 2022-11-30 PROCEDURE — 81025 URINE PREGNANCY TEST: CPT | Performed by: STUDENT IN AN ORGANIZED HEALTH CARE EDUCATION/TRAINING PROGRAM

## 2022-11-30 PROCEDURE — 85025 COMPLETE CBC W/AUTO DIFF WBC: CPT | Performed by: STUDENT IN AN ORGANIZED HEALTH CARE EDUCATION/TRAINING PROGRAM

## 2022-11-30 PROCEDURE — 36415 COLL VENOUS BLD VENIPUNCTURE: CPT

## 2022-11-30 PROCEDURE — 81001 URINALYSIS AUTO W/SCOPE: CPT

## 2022-11-30 RX ORDER — CEFDINIR 300 MG/1
300 CAPSULE ORAL 2 TIMES DAILY
Qty: 14 CAPSULE | Refills: 0 | Status: SHIPPED | OUTPATIENT
Start: 2022-11-30 | End: 2022-12-07

## 2022-11-30 NOTE — ED INITIAL ASSESSMENT (HPI)
Patient was here on 11/25 she was seen and prescribed an antibiotic for a UTI. She started with increased urinary urgency and frequency on 11/23. Antibiotic is not effective and she is having right flank/back pain, abdominal cramping, and nausea without vomiting. She also has a lot of dysuria that continues for a while after urination has completed. Denies any fevers/chills and unknown if she has hematuria, as she is starting her period. She took extra strength tylenol at 8am and around 1115am she took ibuprofen 600mg. Patient is also nursing her 9 month old.

## 2022-11-30 NOTE — ED INITIAL ASSESSMENT (HPI)
Patient seen on Friday at [facility] for uti and placed on antibiotics. Returned to [facility] today for right flank pain and nausea and continued urinary symptoms as well.

## 2022-12-01 NOTE — ED QUICK NOTES
Patient to ED for complaints of right sided abd and flank pain, now improved since arrival. Endorses recently diagnosed w uti; has pressure in pelvis and sharp pain only after she urinates. States urine was bloody but also menstruating.

## 2022-12-02 ENCOUNTER — PATIENT MESSAGE (OUTPATIENT)
Dept: FAMILY MEDICINE CLINIC | Facility: CLINIC | Age: 42
End: 2022-12-02

## 2022-12-02 DIAGNOSIS — N20.0 KIDNEY STONES: Primary | ICD-10-CM

## 2022-12-02 DIAGNOSIS — E78.5 HYPERLIPIDEMIA WITH TARGET LOW DENSITY LIPOPROTEIN (LDL) CHOLESTEROL LESS THAN 100 MG/DL: ICD-10-CM

## 2022-12-02 NOTE — TELEPHONE ENCOUNTER
Rich Grant, UMM 12/2/2022 12:18 PM CST        ----- Message -----  From: Ilan Alexander  Sent: 12/2/2022 11:32 AM CST  To: Em Rn Triage  Subject: Referral for a nutritionist     Hi ,  Currently I am dealing with high cholesterol, GI issues and now kidney stones. I'm feeling a bit overwhelmed as I am trying to correct my eating habits, but things that I can ear to lower my cholesterol I can't eat because of my GI issues and kidney stones and   vice versa. I was following the [address] for the GI issues, but it doesn't seem to be helping. Just hoping that I can get a referral for a nutritionist to help me out.

## 2022-12-05 NOTE — TELEPHONE ENCOUNTER
Blood Glucose flow sheet  reviewed  By Dr Power Sherman through 9/13        Recommendations    Continue Insulin  Levemir 20 units at bedtime       Blood glucose monitoring   With weekly review by MFM    Pt contacted Comprehensive Nutrition Assessment    Type and Reason for Visit:  Positive Nutrition Screen (Unplanned weight loss, decreased appetite)    Nutrition Recommendations/Plan:   ADULT DIET; Regular; GI [place] (GERD/Peptic Ulcer)  Start Ensure Clear 2x/day  Monitor p.o intakes, nausea/ vomiting, GI function and labs     Malnutrition Assessment:  Malnutrition Status:  Severe malnutrition (12/05/22 1636)    Context:  Acute Illness     Findings of the 6 clinical characteristics of malnutrition:  Energy Intake:  50% or less of estimated energy requirements for 5 or more days  Weight Loss:  Greater than 2% over 1 week     Body Fat Loss:  Unable to assess     Muscle Mass Loss:  Unable to assess    Fluid Accumulation:  No significant fluid accumulation Extremities   Strength:  Not Performed    Nutrition Assessment:    Patient admission is related to [place], nausea, vomiting and COVID-19 virus. Patient has not been tolerating food after eating and reports nausea and vomiting for the last two weeks. Patient reports a weight loss of 15 lbs over the last several weeks. Patient currently is on a GERD/Peptic ulcer diet. Due to nausea and vomiting consider modifying diet to clear liquid diet. Monitor p.o intakes, nausea/ vomiting, GI function and labs. Nutrition Related Findings:    No edema. Active bowel sounds. COVID-15 virus. Nausea/ vomiting Wound Type: None       Current Nutrition Intake & Therapies:    Average Meal Intake: 0%     ADULT DIET; Regular; GI [place] (GERD/Peptic Ulcer)    Anthropometric Measures:  Height: 5' 11\" (180.3 cm)  Ideal Body Weight (IBW): 172 lbs (78 kg)       Current Body Weight: 283 lb (128.4 kg), 164.5 % IBW.  Weight Source: Bed Scale  Current BMI (kg/m2): 39.5  Usual Body Weight: 298 lb (135.2 kg)  % Weight Change (Calculated): -5                    BMI Categories: Obese Class 2 (BMI 35.0 -39.9)    Estimated Daily Nutrient Needs:  Energy Requirements Based On: Kcal/kg  Weight Used for Energy Requirements: Current  Energy (kcal/day): 1622-6168 kcal (15-18 kcal/kg)  Weight Used for Protein Requirements: Ideal  Protein (g/day):  gm of protein (1.2-1.3 gm/kg)      Nutrition Diagnosis:   Severe malnutrition related to inadequate protein-energy intake as evidenced by weight loss, intake 0-25%, weight loss greater than or equal to 2% in 1 week    Nutrition Interventions:   Food and/or Nutrient Delivery: Continue Current Diet, Start Oral Nutrition Supplement  Nutrition Education/Counseling: Education not indicated  Coordination of Nutrition Care: Continue to monitor while inpatient       Goals:     Goals: PO intake 50% or greater       Nutrition Monitoring and Evaluation:      Food/Nutrient Intake Outcomes: Food and Nutrient Intake  Physical Signs/Symptoms Outcomes: Nausea or Vomiting, Biochemical Data, Skin, Weight, GI Status, Fluid Status or Edema    Discharge Planning:    Continue current diet, Continue Oral Nutrition Supplement         Millicent KENT, RDN, LDN  Lead Clinical Dietitian  RD Office Phone [phone]

## 2022-12-10 ENCOUNTER — HOSPITAL ENCOUNTER (OUTPATIENT)
Dept: MAMMOGRAPHY | Facility: HOSPITAL | Age: 42
Discharge: HOME | End: 2022-12-10
Attending: FAMILY MEDICINE
Payer: COMMERCIAL

## 2022-12-10 DIAGNOSIS — Z12.31 ENCOUNTER FOR SCREENING MAMMOGRAM FOR MALIGNANT NEOPLASM OF BREAST: ICD-10-CM

## 2022-12-10 PROCEDURE — 77063 BREAST TOMOSYNTHESIS BI: CPT | Performed by: FAMILY MEDICINE

## 2022-12-10 PROCEDURE — 77067 SCR MAMMO BI INCL CAD: CPT | Performed by: FAMILY MEDICINE

## 2022-12-12 ENCOUNTER — PATIENT MESSAGE (OUTPATIENT)
Dept: FAMILY MEDICINE CLINIC | Facility: CLINIC | Age: 42
End: 2022-12-12

## 2022-12-12 DIAGNOSIS — R92.2 DENSE BREAST TISSUE: Primary | ICD-10-CM

## 2022-12-21 ENCOUNTER — PATIENT MESSAGE (OUTPATIENT)
Dept: SURGERY | Facility: CLINIC | Age: 42
End: 2022-12-21

## 2022-12-21 ENCOUNTER — OFFICE VISIT (OUTPATIENT)
Dept: SURGERY | Facility: CLINIC | Age: 42
End: 2022-12-21
Payer: COMMERCIAL

## 2022-12-21 VITALS — DIASTOLIC BLOOD PRESSURE: 78 MMHG | SYSTOLIC BLOOD PRESSURE: 117 MMHG | HEART RATE: 83 BPM

## 2022-12-21 DIAGNOSIS — N20.0 NEPHROLITHIASIS: Primary | ICD-10-CM

## 2022-12-21 PROCEDURE — 99244 OFF/OP CNSLTJ NEW/EST MOD 40: CPT | Performed by: UROLOGY

## 2022-12-21 PROCEDURE — 3074F SYST BP LT 130 MM HG: CPT | Performed by: UROLOGY

## 2022-12-21 PROCEDURE — 3078F DIAST BP <80 MM HG: CPT | Performed by: UROLOGY

## 2022-12-21 RX ORDER — TAMSULOSIN HYDROCHLORIDE 0.4 MG/1
0.4 CAPSULE ORAL DAILY
Qty: 30 CAPSULE | Refills: 0 | Status: SHIPPED | OUTPATIENT
Start: 2022-12-21 | End: 2022-12-21 | Stop reason: CLARIF

## 2022-12-22 NOTE — TELEPHONE ENCOUNTER
From: Mily Cuba  To: Nathalie Garcia MD  Sent: 12/21/2022 3:26 PM CST  Subject: Referral for Renal Ultrasound     I was trying to schedule my 6 month renal ultrasound appointment but scheduling doesn't have the order. Please let me know when the order has been placed so I can make the appointment.  Thank you!!!

## 2022-12-29 ENCOUNTER — HOSPITAL ENCOUNTER (OUTPATIENT)
Dept: MRI IMAGING | Facility: HOSPITAL | Age: 42
Discharge: HOME | End: 2022-12-29
Attending: FAMILY MEDICINE
Payer: COMMERCIAL

## 2022-12-29 DIAGNOSIS — R92.2 DENSE BREAST TISSUE: ICD-10-CM

## 2022-12-29 PROCEDURE — 77049 MRI BREAST C-+ W/CAD BI: CPT | Performed by: FAMILY MEDICINE

## 2022-12-29 PROCEDURE — A9575 INJ GADOTERATE MEGLUMI 0.1ML: HCPCS | Performed by: FAMILY MEDICINE

## 2022-12-29 RX ORDER — GADOTERATE MEGLUMINE 376.9 MG/ML
15 INJECTION INTRAVENOUS
Status: COMPLETED | OUTPATIENT
Start: 2022-12-29 | End: 2022-12-29

## 2022-12-29 RX ADMIN — GADOTERATE MEGLUMINE 15 ML: 376.9 INJECTION INTRAVENOUS at 15:42:00

## 2023-01-08 ENCOUNTER — PATIENT MESSAGE (OUTPATIENT)
Dept: SURGERY | Facility: CLINIC | Age: 43
End: 2023-01-08

## 2023-01-08 ENCOUNTER — PATIENT MESSAGE (OUTPATIENT)
Facility: CLINIC | Age: 43
End: 2023-01-08

## 2023-01-09 ENCOUNTER — TELEPHONE (OUTPATIENT)
Dept: FAMILY MEDICINE CLINIC | Facility: CLINIC | Age: 43
End: 2023-01-09

## 2023-01-09 ENCOUNTER — TELEPHONE (OUTPATIENT)
Dept: SURGERY | Facility: CLINIC | Age: 43
End: 2023-01-09

## 2023-01-09 NOTE — TELEPHONE ENCOUNTER
Patient sent message to Urology as well, no response as of yet. So this morning I was feeling some stomach cramps nothing too bad. Late this afternoon I noticed blood after I urinated. It was in my urine as well as on the toilet paper after I wiped. At first I thought maybe my period came early as it's due in 13 days, however the second time I urinated there was only blood in my urine. Nothing when I wiped with toilet paper. The 3rd time I urinated there was no blood, but blood was visible the 4th time and super visible the 5th time. My urine is clear like water so the blood although faint after the first time was easy to spot and definitely visible the last time. I sent my Urologist an email as well. As of now not feeling any pain.

## 2023-01-09 NOTE — TELEPHONE ENCOUNTER
-Routed to Dr. Samuel Griffin:  Please review & advise. Thank Gilbert Holloway RN    -Call-Center transferred pt to Urology; identity verified with name & .    -On 23, pt experienced cramping followed by hematuria (Hawiian punch color). -Her mensus is due in 12 days, \"but I thought it was early. \"    -With hydration, urine color becomes light pink.    -In the am today, pt again experienced hematuria (no cramping), & with hydration the urine becomes light pink.    -Pt scheduled 23 for US kidney/ bladder followed by OV with AR on 23 to discuss results.    -Outcome pending physician review.

## 2023-01-09 NOTE — TELEPHONE ENCOUNTER
From: Nadara January  To: Haydee Fleming DO  Sent: 1/8/2023 10:06 PM CST  Subject: Blood in urine    So this morning I was feeling some stomach cramps nothing too bad. Late this afternoon I noticed blood after I urinated. It was in my urine as well as on the toilet paper after I wiped. At first I thought maybe my period came early as it's due in 13 days, however the second time I urinated there was only blood in my urine. Nothing when I wiped with toilet paper. The 3rd time I urinated there was no blood, but blood was visible the 4th time and super visible the 5th time. My urine is clear like water so the blood although faint after the first time was easy to spot and definitely visible the last time. I sent my Urologist an email as well. As of now not feeling any pain.

## 2023-01-09 NOTE — TELEPHONE ENCOUNTER
Advised patient of Dr. Elsa Dunham note. Patient verbalized understanding and not having any pain or fevers. Patient will await response from urology.

## 2023-01-09 NOTE — TELEPHONE ENCOUNTER
Mook Mc, I just tried to reach you by phone and left you a message to call me back at the office. I'll also send you a my chart msg.

## 2023-01-10 ENCOUNTER — TELEPHONE (OUTPATIENT)
Dept: SURGERY | Facility: CLINIC | Age: 43
End: 2023-01-10

## 2023-01-10 NOTE — TELEPHONE ENCOUNTER
I called pt verified name/ informed pt of urine testing and office cystoscopy pt states she already received a call today and is scheduled for her office cystoscopy and will do a urine test at one of the out patient labs.

## 2023-01-13 ENCOUNTER — LAB ENCOUNTER (OUTPATIENT)
Dept: LAB | Facility: HOSPITAL | Age: 43
End: 2023-01-13
Attending: UROLOGY
Payer: COMMERCIAL

## 2023-01-13 DIAGNOSIS — R31.0 GROSS HEMATURIA: ICD-10-CM

## 2023-01-13 LAB
BILIRUB UR QL: NEGATIVE
CLARITY UR: CLEAR
COLOR UR: YELLOW
GLUCOSE UR-MCNC: NEGATIVE MG/DL
KETONES UR-MCNC: NEGATIVE MG/DL
NITRITE UR QL STRIP.AUTO: NEGATIVE
PH UR: 6.5 [PH] (ref 5–8)
PROT UR-MCNC: NEGATIVE MG/DL
SP GR UR STRIP: 1.01 (ref 1–1.03)
UROBILINOGEN UR STRIP-ACNC: 0.2

## 2023-01-13 PROCEDURE — 81015 MICROSCOPIC EXAM OF URINE: CPT

## 2023-01-13 PROCEDURE — 88108 CYTOPATH CONCENTRATE TECH: CPT

## 2023-01-13 PROCEDURE — 87086 URINE CULTURE/COLONY COUNT: CPT

## 2023-01-13 PROCEDURE — 81001 URINALYSIS AUTO W/SCOPE: CPT

## 2023-01-16 ENCOUNTER — PROCEDURE (OUTPATIENT)
Dept: SURGERY | Facility: CLINIC | Age: 43
End: 2023-01-16

## 2023-01-16 VITALS — SYSTOLIC BLOOD PRESSURE: 146 MMHG | HEART RATE: 81 BPM | DIASTOLIC BLOOD PRESSURE: 77 MMHG

## 2023-01-16 DIAGNOSIS — R31.0 GROSS HEMATURIA: Primary | ICD-10-CM

## 2023-01-16 LAB — NON GYNE INTERPRETATION: NEGATIVE

## 2023-01-16 RX ORDER — TAMSULOSIN HYDROCHLORIDE 0.4 MG/1
0.4 CAPSULE ORAL DAILY
Qty: 30 CAPSULE | Refills: 0 | Status: CANCELLED | OUTPATIENT
Start: 2023-01-16 | End: 2023-02-15

## 2023-01-16 RX ORDER — KETOROLAC TROMETHAMINE 10 MG/1
10 TABLET, FILM COATED ORAL EVERY 6 HOURS PRN
Qty: 5 TABLET | Refills: 0 | Status: CANCELLED | OUTPATIENT
Start: 2023-01-16

## 2023-01-16 NOTE — PROCEDURES
CYSTOSCOPY (FEMALE)    PRE-OP DIAGNOSIS: gross hematuria    POST-OP DIAGNOSIS: same    PROCEDURE: Cystsocopy    SURGEON: Joleen Ceja MD    ASSISTANT: none     EBL: minimal    FINDINGS:   1. Urethra: No urethral lesions, no urethral strictures  2. Bladder: Bilateral ureteral orifices in orthotopic position with efflux (darker efflux from left), no suspicious or concerning erythematous lesions, no papillary bladder tumors, no stones   3. Retroflexion: no abnormalities   4. Other findings: none    INDICATIONS: gross hematuria. UCx negative. Cytology negative. CT from 2022 with multiple nonobstructing kidney stones. PROCEDURE: Patient was brought to the procedure suite and a timeout was performed identifying the patient,  and procedure being performed. The risks of the procedure were once again detailed to the patient including bleeding, infection, dysuria. The patient agreed to proceed. The patient had a negative urinalysis. No antibiotics were given to patient prior to this procedure. She was placed in a supine position on the table and a flexible cystoscope was inserted per urethra. There were no obvious urethral lesions or strictures. Once in the bladder we performed a full diagnostic/surveillance cystoscopy which demonstrated no abnormalities. On retroflexion we noted no abnormalties. The scope was then carefully removed and once again no urethral abnormalities were noted. There were no complications after this procedure and the patient tolerated the procedure without issue. IMPRESSION: cysto negative. Patient with left flank pain now. No fevers. Recommended hydration, tylenol and hotpacks to start. She may be passing a kidney stone. She knows to contact me in 1 week to update me with symptoms. Could consider tamsulosin and toradol once approved by her OB as she is breast feeding. Patient to bring stone to clinic if she catches it.  If she has fevers, she knows to go to the ER where CT noncontrast will be important for diagnosis. PLAN:    1. Patient to update me in 1 week  2.  Follow up in 2 weeks - if she is still having pain, will order CT scan

## 2023-01-30 ENCOUNTER — TELEPHONE (OUTPATIENT)
Dept: SURGERY | Facility: CLINIC | Age: 43
End: 2023-01-30

## 2023-01-30 NOTE — TELEPHONE ENCOUNTER
I s/w pt and told her that [address] had ordered a Kidney /bladder US and not a GB US and I explained that an oily substance in her stool should be addressed to her PCP and or her GI specialist. She stated that she has an appt to see a GI doc on 2/6 and has the kidney/bladder [address] for June before her f/u so she doesn't know why she has to come in tomorrow. I explained that when she had the cysto procedure with AR on 1/16 the plan she agreed to was that she would f/u in 2 weeks and if still having pain at that time a CT scan may be ordered. Pt agreed to keep her appt for tomorrow. (Newest Message First)  January 30, 2023  Lynda Blair  to [facility] Urology Clinical Staff (supporting Kami Mendoza MD)    Regla Gan 1:35 PM  So I started having an oily substance in my stool. Is it possible to get an Ultrasound in the morning before my afternoon appointment?

## 2023-01-30 NOTE — TELEPHONE ENCOUNTER
PT called stating she had sent several my chart messages and not received a call back. PT has appointment tomorrow.      Please advise    Future Appointments   Date Time Provider Pippa Hobsoni   1/31/2023  3:00 PM [facility]   2/6/2023  1:20 PM Brandon Pritchard MD [facility] OF [facility]   4/3/2023  3:20 PM GAL Campos [address] OF [facility]   6/16/2023 11:00 AM [address] [place] [address] OF [facility]   6/23/2023 10:00 AM Vandana Garcia MD [facility]   9/5/2023 11:30 AM Phuc Cortes PA-C [facility]   9/25/2023  1:00 PM Curt Castellon MD [facility]

## 2023-01-31 ENCOUNTER — OFFICE VISIT (OUTPATIENT)
Dept: SURGERY | Facility: CLINIC | Age: 43
End: 2023-01-31

## 2023-01-31 DIAGNOSIS — N20.0 NEPHROLITHIASIS: Primary | ICD-10-CM

## 2023-01-31 PROCEDURE — 99214 OFFICE O/P EST MOD 30 MIN: CPT | Performed by: UROLOGY

## 2023-02-02 DIAGNOSIS — N20.0 NEPHROLITHIASIS: Primary | ICD-10-CM

## 2023-02-03 ENCOUNTER — PATIENT MESSAGE (OUTPATIENT)
Facility: CLINIC | Age: 43
End: 2023-02-03

## 2023-02-03 NOTE — TELEPHONE ENCOUNTER
From: Janina Concepcion  To: Alo Alberto DO  Sent: 2/3/2023 11:25 AM CST  Subject: GI Issues    Hi Dr. Rashid Cuevas   I reached out to the GI Specialist that I am seeing on Monday and this was their response:  Sho Thakkar,   Unfortunately, since we have not seen you in the office, we cannot provide you with triage advice and recommendations. Please contact you primary doctor with your GI symptoms or go to Emergency Room if your symptoms get worse. Thank you. Warm Regards,  GI Staff    So I am now reaching out to you. Attached is everything I sent to them. Hi Dr. Carol Trujillo,  I have a consultation with you on Monday. Just giving you a heads up as to issues going on. I've had some oil like substance in my stool and sometimes part of my stool is white in color. Today I had some stomach cramps and then I had loose stool twice. The third time I thought that I was having a bowel movement and it felt like loose stool, but it was blood. I'm attaching a picture. This is the first time that has happened.

## 2023-02-06 ENCOUNTER — OFFICE VISIT (OUTPATIENT)
Facility: CLINIC | Age: 43
End: 2023-02-06

## 2023-02-06 ENCOUNTER — TELEPHONE (OUTPATIENT)
Facility: CLINIC | Age: 43
End: 2023-02-06

## 2023-02-06 VITALS
WEIGHT: 169.81 LBS | SYSTOLIC BLOOD PRESSURE: 108 MMHG | BODY MASS INDEX: 32 KG/M2 | DIASTOLIC BLOOD PRESSURE: 69 MMHG | HEART RATE: 76 BPM

## 2023-02-06 DIAGNOSIS — K62.5 RECTAL BLEEDING: Primary | ICD-10-CM

## 2023-02-06 DIAGNOSIS — K62.5 BLOOD PER RECTUM: Primary | ICD-10-CM

## 2023-02-06 DIAGNOSIS — R10.13 DYSPEPSIA: ICD-10-CM

## 2023-02-06 PROCEDURE — 3074F SYST BP LT 130 MM HG: CPT | Performed by: INTERNAL MEDICINE

## 2023-02-06 PROCEDURE — 99244 OFF/OP CNSLTJ NEW/EST MOD 40: CPT | Performed by: INTERNAL MEDICINE

## 2023-02-06 PROCEDURE — 3078F DIAST BP <80 MM HG: CPT | Performed by: INTERNAL MEDICINE

## 2023-02-06 RX ORDER — POLYETHYLENE GLYCOL 3350, SODIUM CHLORIDE, SODIUM BICARBONATE, POTASSIUM CHLORIDE 420; 11.2; 5.72; 1.48 G/4L; G/4L; G/4L; G/4L
POWDER, FOR SOLUTION ORAL
Qty: 1 EACH | Refills: 0 | Status: SHIPPED | OUTPATIENT
Start: 2023-02-06

## 2023-02-06 NOTE — TELEPHONE ENCOUNTER
Scheduled for:  Colonoscopy [phone] ,[address]   Provider Name:  Nirmala Aguilar  Date:  3/8/2023  Location:  [facility]  Sedation:  Mac  Time: 0930 Am (pt is aware to arrive at 0830 Am )   Prep: Split dose Colyte or Trilyte ,Egd  I discussed prep instructions with the patient at the time of the appointment which she verbally understood and given the prep instructions. Meds/Allergies Reconciled?:  Physician reviewed     Diagnosis with codes:  Rectal bleeding K62.5,  Dyspepsia R10.13   Was patient informed to call insurance with codes (Y/N):  Yes, I confirmed [employer] [place] insurance with the patient. The patient also verbally understands to call her  insurance to check for pre-cert, codes were given on prep instructions. Referral sent?:  N/a  [facility] or [facility] notified?:  I sent an electronic request to Endo Scheduling and received a confirmation today. Medication Orders: This patient verbally confirmed that she is not taking:   Iron, blood thinners, BP meds, and is not diabetic   Not latex allergy, Not PCN allergy and does not have a pacemaker Pt is aware to NOT take iron pills, herbal meds and diet supplements for 7 days before exam. Also to NOT take any form of alcohol, recreational drugs and any forms of ED meds 24 hours before exam.    Misc Orders:  Patient was informed that they will need a COVID 19 test prior to their procedure. Patient verbally understood & will await a phone call from [facility] to schedule.       Further instructions given by staff:

## 2023-02-06 NOTE — PATIENT INSTRUCTIONS
# Blood per rectum  # change in stools  # dyspepsia irregular bowels    Recommend:  - Miralax up to every other day  - Discussed Hpylori test and celiac but wants EGD  -Schedule EGD/colonoscopy w/MAC sedation for hematochezia, change in stools, dyspepsia   -Prep: Split dose Colyte or equivalent    ** If MAC @ [facility]/NE:    - NO alcohol, recreational drugs nor erectile dysfunction mediations 24 hours before procedure(s)   - NO herbal supplements or weight loss medications x 7 days prior to the procedure(s)    ** If MAC @ [facility] or [facility] [place] - continue all medications as prescribed    Read all bowel prep instructions carefully    AVOID seeds, nuts, popcorn, raw fruits and vegetables (cooked is okay) for 2-3 days before procedure    You have to get COVID testing done 72 hours before the procedure date       >>>Please note: if you were prescribed Suprep for the bowel prep and it is too expensive or not covered by insurance, it is okay to substitute Trilyte (or any similar generic prep). This can be done by notifying the pharmacy or calling our office.

## 2023-02-06 NOTE — TELEPHONE ENCOUNTER
Patient was seen by GI and has an EGD/colonoscopy on 3/8/2023. Spoke with patient, relayed provider message. Reports lower back pain and urinary frequency, denies dysuria. She will  the antibiotic    She has been taking pantoprazole, and denies GERD symptoms. 1. She is concerned for ongoing intermittent sharp LUQ pain near the ribcage. Denies trauma. She had hoped the US address that area of pain. She is specifically asking about the spleen? 2. Also asking if her insulin level can be checked. I reassured her if there is no evidence of diabetes, her insulin is doing its job.   However, she would like this lab test added on if possible

## 2023-02-07 ENCOUNTER — PATIENT MESSAGE (OUTPATIENT)
Facility: CLINIC | Age: 43
End: 2023-02-07

## 2023-02-07 DIAGNOSIS — R19.7 DIARRHEA, UNSPECIFIED TYPE: ICD-10-CM

## 2023-02-07 DIAGNOSIS — K90.9 FATTY STOOLS: Primary | ICD-10-CM

## 2023-02-08 NOTE — TELEPHONE ENCOUNTER
Dr. Kyle Foster    Patient attached pictures of oily substance discussed during 2/6/2023 appointment for your review. Thank you.

## 2023-02-08 NOTE — TELEPHONE ENCOUNTER
From: Nikki Little  To: Renetta Vasquez MD  Sent: 2/7/2023 5:46 PM CST  Subject: Oily substance after using restroom     This message is being sent by Tatyana Jasmine on behalf of Nikki Little. I know we have the procedure for March 8, but this is the oily substance we talked about in our appointment.

## 2023-02-09 NOTE — TELEPHONE ENCOUNTER
Patient contacted and message from Dr. Ligia Phan given. Number for Central scheduling given. Patient voiced understanding.

## 2023-02-09 NOTE — TELEPHONE ENCOUNTER
Dr. Ligia Phan    I don't see the order for the [address]. Please place order and I can let patient know how to schedule.   Patient is scheduled for CT abdomen through Dr. Pedro Pablo Trinidad     Thank you

## 2023-02-24 ENCOUNTER — TELEPHONE (OUTPATIENT)
Dept: SURGERY | Facility: CLINIC | Age: 43
End: 2023-02-24

## 2023-02-24 ENCOUNTER — HOSPITAL ENCOUNTER (OUTPATIENT)
Dept: ULTRASOUND IMAGING | Age: 43
Discharge: HOME | End: 2023-02-24
Attending: INTERNAL MEDICINE
Payer: COMMERCIAL

## 2023-02-24 DIAGNOSIS — K90.9 FATTY STOOLS: ICD-10-CM

## 2023-02-24 DIAGNOSIS — R19.7 DIARRHEA, UNSPECIFIED TYPE: ICD-10-CM

## 2023-02-24 DIAGNOSIS — N20.0 NEPHROLITHIASIS: Primary | ICD-10-CM

## 2023-02-24 PROCEDURE — 76700 US EXAM ABDOM COMPLETE: CPT | Performed by: INTERNAL MEDICINE

## 2023-02-27 ENCOUNTER — TELEPHONE (OUTPATIENT)
Dept: SURGERY | Facility: CLINIC | Age: 43
End: 2023-02-27

## 2023-02-27 NOTE — TELEPHONE ENCOUNTER
-S/w pt; identity verified with name & .  -Pt reports she is satisfied with OV on 3/13/23 to discuss imaging results with AR. -Encounter complete.

## 2023-03-05 ENCOUNTER — LAB ENCOUNTER (OUTPATIENT)
Dept: LAB | Age: 43
End: 2023-03-05
Attending: INTERNAL MEDICINE
Payer: COMMERCIAL

## 2023-03-05 DIAGNOSIS — Z01.818 PRE-OP TESTING: ICD-10-CM

## 2023-03-06 LAB — SARS-COV-2 RNA RESP QL NAA+PROBE: NOT DETECTED

## 2023-03-08 ENCOUNTER — HOSPITAL ENCOUNTER (OUTPATIENT)
Age: 43
Setting detail: HOSPITAL OUTPATIENT SURGERY
Discharge: HOME | End: 2023-03-08
Attending: INTERNAL MEDICINE | Admitting: INTERNAL MEDICINE
Payer: COMMERCIAL

## 2023-03-08 ENCOUNTER — ANESTHESIA (OUTPATIENT)
Dept: ENDOSCOPY | Age: 43
End: 2023-03-08
Payer: COMMERCIAL

## 2023-03-08 ENCOUNTER — ANESTHESIA EVENT (OUTPATIENT)
Dept: ENDOSCOPY | Age: 43
End: 2023-03-08
Payer: COMMERCIAL

## 2023-03-08 VITALS
WEIGHT: 169 LBS | TEMPERATURE: 97 F | SYSTOLIC BLOOD PRESSURE: 110 MMHG | HEART RATE: 79 BPM | DIASTOLIC BLOOD PRESSURE: 81 MMHG | OXYGEN SATURATION: 98 % | BODY MASS INDEX: 31.91 KG/M2 | HEIGHT: 61 IN | RESPIRATION RATE: 27 BRPM

## 2023-03-08 DIAGNOSIS — R10.13 DYSPEPSIA: ICD-10-CM

## 2023-03-08 DIAGNOSIS — K62.5 RECTAL BLEEDING: ICD-10-CM

## 2023-03-08 DIAGNOSIS — Z01.818 PRE-OP TESTING: Primary | ICD-10-CM

## 2023-03-08 LAB — B-HCG UR QL: NEGATIVE

## 2023-03-08 PROCEDURE — 45385 COLONOSCOPY W/LESION REMOVAL: CPT | Performed by: INTERNAL MEDICINE

## 2023-03-08 PROCEDURE — 43239 EGD BIOPSY SINGLE/MULTIPLE: CPT | Performed by: INTERNAL MEDICINE

## 2023-03-08 PROCEDURE — 99070 SPECIAL SUPPLIES PHYS/QHP: CPT | Performed by: INTERNAL MEDICINE

## 2023-03-08 RX ORDER — LIDOCAINE HYDROCHLORIDE 20 MG/ML
INJECTION, SOLUTION EPIDURAL; INFILTRATION; INTRACAUDAL; PERINEURAL AS NEEDED
Status: DISCONTINUED | OUTPATIENT
Start: 2023-03-08 | End: 2023-03-08 | Stop reason: SURG

## 2023-03-08 RX ORDER — SODIUM CHLORIDE, SODIUM LACTATE, POTASSIUM CHLORIDE, CALCIUM CHLORIDE 600; 310; 30; 20 MG/100ML; MG/100ML; MG/100ML; MG/100ML
INJECTION, SOLUTION INTRAVENOUS CONTINUOUS
OUTPATIENT
Start: 2023-03-08

## 2023-03-08 RX ORDER — GLYCOPYRROLATE 0.2 MG/ML
INJECTION, SOLUTION INTRAMUSCULAR; INTRAVENOUS AS NEEDED
Status: DISCONTINUED | OUTPATIENT
Start: 2023-03-08 | End: 2023-03-08 | Stop reason: SURG

## 2023-03-08 RX ORDER — DEXTROSE MONOHYDRATE 25 G/50ML
50 INJECTION, SOLUTION INTRAVENOUS
OUTPATIENT
Start: 2023-03-08

## 2023-03-08 RX ORDER — NICOTINE POLACRILEX 4 MG
30 LOZENGE BUCCAL
OUTPATIENT
Start: 2023-03-08

## 2023-03-08 RX ORDER — NICOTINE POLACRILEX 4 MG
15 LOZENGE BUCCAL
OUTPATIENT
Start: 2023-03-08

## 2023-03-08 RX ORDER — SODIUM CHLORIDE, SODIUM LACTATE, POTASSIUM CHLORIDE, CALCIUM CHLORIDE 600; 310; 30; 20 MG/100ML; MG/100ML; MG/100ML; MG/100ML
INJECTION, SOLUTION INTRAVENOUS CONTINUOUS
Status: DISCONTINUED | OUTPATIENT
Start: 2023-03-08 | End: 2023-03-08

## 2023-03-08 RX ADMIN — SODIUM CHLORIDE, SODIUM LACTATE, POTASSIUM CHLORIDE, CALCIUM CHLORIDE: 600; 310; 30; 20 INJECTION, SOLUTION INTRAVENOUS at 09:39:00

## 2023-03-08 RX ADMIN — GLYCOPYRROLATE 0.2 MG: 0.2 INJECTION, SOLUTION INTRAMUSCULAR; INTRAVENOUS at 09:40:00

## 2023-03-08 RX ADMIN — LIDOCAINE HYDROCHLORIDE 40 MG: 20 INJECTION, SOLUTION EPIDURAL; INFILTRATION; INTRACAUDAL; PERINEURAL at 09:40:00

## 2023-03-08 RX ADMIN — SODIUM CHLORIDE, SODIUM LACTATE, POTASSIUM CHLORIDE, CALCIUM CHLORIDE: 600; 310; 30; 20 INJECTION, SOLUTION INTRAVENOUS at 10:21:00

## 2023-03-08 NOTE — ANESTHESIA PROCEDURE NOTES
Peripheral IV  Date/Time: 3/8/2023 9:35 AM  Inserted by: Heidy Roland CRNA    Placement  Needle size: 20 G  Laterality: left  Location: antecubital  Site prep: alcohol  Technique: anatomical landmarks  Attempts: 1

## 2023-03-08 NOTE — DISCHARGE INSTRUCTIONS
Home Care Instructions for Colonoscopy and/or Gastroscopy with Sedation    Diet:  - Resume your regular diet as tolerated unless otherwise instructed. - Start with light meals to minimize bloating.  - Do not drink alcohol today. Medication:  - If you have questions about resuming your normal medications, please contact your Primary Care Physician. Activities:  - Take it easy today. Do not return to work today. - Do not drive today. - Do not operate any machinery today (including kitchen equipment). -     Don't sign any legal documents or make critical decisions. Colonoscopy:  - You may notice some rectal \"spotting\" (a little blood on the toilet tissue) for a day or two after the exam. This is normal.  - If you experience any rectal bleeding (not spotting), persistent tenderness or sharp severe abdominal pains, oral temperature over 100 degrees Fahrenheit, light-headedness or dizziness, or any other problems, contact your doctor. Gastroscopy:  - You may have a sore throat for 2-3 days following the exam. This is normal. Gargling with warm salt water (1/2 tsp salt to 1 glass warm water) or using throat lozenges will help. - If you experience any sharp pain in your neck, abdomen or chest, vomiting of blood, oral temperature over 100 degrees Fahrenheit, light-headedness or dizziness, or any other problems, contact your doctor. **If unable to reach your doctor, please go to the [facility] Emergency Room**    - Your referring physician will receive a full report of your examination.  - If you do not hear from your doctor's office within two weeks of your biopsy, please call them for your results. Home Care Instructions for Colonoscopy and/or Gastroscopy with Sedation    Diet:  - Resume your regular diet as tolerated unless otherwise instructed. - Start with light meals to minimize bloating.  - Do not drink alcohol today.     Medication:  - If you have questions about resuming your normal medications, please contact your Primary Care Physician. Activities:  - Take it easy today. Do not return to work today. - Do not drive today. - Do not operate any machinery today (including kitchen equipment). -     Don't sign any legal documents or make critical decisions. Colonoscopy:  - You may notice some rectal \"spotting\" (a little blood on the toilet tissue) for a day or two after the exam. This is normal.  - If you experience any rectal bleeding (not spotting), persistent tenderness or sharp severe abdominal pains, oral temperature over 100 degrees Fahrenheit, light-headedness or dizziness, or any other problems, contact your doctor. Gastroscopy:  - You may have a sore throat for 2-3 days following the exam. This is normal. Gargling with warm salt water (1/2 tsp salt to 1 glass warm water) or using throat lozenges will help. - If you experience any sharp pain in your neck, abdomen or chest, vomiting of blood, oral temperature over 100 degrees Fahrenheit, light-headedness or dizziness, or any other problems, contact your doctor. **If unable to reach your doctor, please go to the [facility] Emergency Room**    - Your referring physician will receive a full report of your examination.  - If you do not hear from your doctor's office within two weeks of your biopsy, please call them for your results.          1 clip placed

## 2023-03-08 NOTE — ANESTHESIA POSTPROCEDURE EVALUATION
Patient: Vivian Hale    Procedure Summary     Date: 03/08/23 Room / Location: [facility] ENDOSCOPY 01 / [facility] ENDO    Anesthesia Start: 0939 Anesthesia Stop:     Procedures:       COLONOSCOPY      ESOPHAGOGASTRODUODENOSCOPY (EGD) Diagnosis:       Rectal bleeding      Dyspepsia      (gastric erosions, gastric erythema, colon polyp, hemorroids, prominent appendixeal orfice)    Surgeons: Nedra Garber MD Anesthesiologist:     Anesthesia Type: MAC, general ASA Status: 2          Anesthesia Type: MAC, general    Vitals Value Taken Time   /52 03/08/23 1024   Temp  03/08/23 1028   Pulse 88 03/08/23 1024   Resp 16 03/08/23 1024   SpO2 100 % 03/08/23 1024       [facility] AN Post Evaluation:   Patient Evaluated in Patient location: Endo recovery.   Patient Participation: waiting for patient participation  Level of Consciousness: sleepy but conscious  Pain Score: 0  Pain Management: adequate  Airway Patency:patent  Yes    Cardiovascular Status: acceptable  Respiratory Status: acceptable  Postoperative Hydration acceptable      Lucila Vital CRNA  3/8/2023 10:28 AM

## 2023-03-08 NOTE — INTERVAL H&P NOTE
Pre-op Diagnosis: Rectal bleeding /Dyspepsia    The above referenced H&P was reviewed by Demetri Wyatt MD on 3/8/2023, the patient was examined and no significant changes have occurred in the patient's condition since the H&P was performed. I discussed with the patient and/or legal representative the potential benefits, risks and side effects of this procedure; the likelihood of the patient achieving goals; and potential problems that might occur during recuperation. I discussed reasonable alternatives to the procedure, including risks, benefits and side effects related to the alternatives and risks related to not receiving this procedure. We will proceed with procedure as planned.

## 2023-03-08 NOTE — ADDENDUM NOTE
Addendum  created 03/08/23 1043 by Abhay Herron CRNA    Child order released for a procedure order, Clinical Note Signed, Intraprocedure Blocks edited, SmartForm saved

## 2023-03-09 ENCOUNTER — TELEPHONE (OUTPATIENT)
Facility: CLINIC | Age: 43
End: 2023-03-09

## 2023-03-09 ENCOUNTER — HOSPITAL ENCOUNTER (OUTPATIENT)
Dept: CT IMAGING | Facility: HOSPITAL | Age: 43
Discharge: HOME | End: 2023-03-09
Attending: UROLOGY
Payer: COMMERCIAL

## 2023-03-09 DIAGNOSIS — N20.0 NEPHROLITHIASIS: ICD-10-CM

## 2023-03-09 DIAGNOSIS — K38.9 APPENDIX DISEASE: Primary | ICD-10-CM

## 2023-03-09 LAB
CREAT BLD-MCNC: 0.8 MG/DL
GFR SERPLBLD BASED ON 1.73 SQ M-ARVRAT: 95 ML/MIN/1.73M2 (ref 60–?)

## 2023-03-09 PROCEDURE — 82565 ASSAY OF CREATININE: CPT

## 2023-03-09 PROCEDURE — 74178 CT ABD&PLV WO CNTR FLWD CNTR: CPT | Performed by: UROLOGY

## 2023-03-09 PROCEDURE — 76377 3D RENDER W/INTRP POSTPROCES: CPT | Performed by: UROLOGY

## 2023-03-09 RX ORDER — AMOXICILLIN AND CLAVULANATE POTASSIUM 875; 125 MG/1; MG/1
1 TABLET, FILM COATED ORAL 2 TIMES DAILY
Qty: 20 TABLET | Refills: 0 | Status: SHIPPED | OUTPATIENT
Start: 2023-03-09 | End: 2023-03-19

## 2023-03-09 NOTE — TELEPHONE ENCOUNTER
Called patient to discuss, confirmed information below. Patient states she was not started on antibiotic. Patient also had CT urogram today and may show appendix if you would like to review when available (results pending at this time). Patient denies any abdominal pain, nausea or vomiting. Patient would like to know if there is any further follow up needed at this time?

## 2023-03-09 NOTE — TELEPHONE ENCOUNTER
Pt and  called in stating pt had a colonoscopy done yesterday 3/8/23. They told her that her appendix is inflamed and it also had puss which the puss was removed. They would like to know what Dr Lizet Sanabira would recommend as the next step.

## 2023-03-09 NOTE — TELEPHONE ENCOUNTER
Spoke with surgeon, Dr. Kevin Cash, who recommended starting her on Augmentin bid x 10 days and I sent this to her pharmacy. He would like to see her in the office to discuss possible appendectomy as it appears she may be higher risk for appendicitis in the future. Please have her call to schedule today, but to go to the ER if she develops any fevers, severe lower abdominal pain, and/or nausea with vomiting and anorexia.

## 2023-03-10 ENCOUNTER — PATIENT MESSAGE (OUTPATIENT)
Facility: CLINIC | Age: 43
End: 2023-03-10

## 2023-03-10 ENCOUNTER — TELEPHONE (OUTPATIENT)
Dept: SURGERY | Facility: CLINIC | Age: 43
End: 2023-03-10

## 2023-03-10 NOTE — TELEPHONE ENCOUNTER
From: Ely Leyva  To: Day Riojas MD  Sent: 3/10/2023 12:39 PM CST  Subject: Follow up Appointment     Dr. Sebastian Giraldo  I was trying to schedule a follow-up appointment with you as the biopsy reports came out. You don't have any openings until July 24th. Not sure if that's ok or if I should come in sooner.

## 2023-03-13 ENCOUNTER — OFFICE VISIT (OUTPATIENT)
Dept: SURGERY | Facility: CLINIC | Age: 43
End: 2023-03-13

## 2023-03-13 DIAGNOSIS — N20.0 NEPHROLITHIASIS: Primary | ICD-10-CM

## 2023-03-13 PROCEDURE — 99214 OFFICE O/P EST MOD 30 MIN: CPT | Performed by: UROLOGY

## 2023-03-13 RX ORDER — TAMSULOSIN HYDROCHLORIDE 0.4 MG/1
0.4 CAPSULE ORAL DAILY
Qty: 30 CAPSULE | Refills: 11 | Status: SHIPPED | OUTPATIENT
Start: 2023-03-13 | End: 2023-04-12

## 2023-03-13 NOTE — TELEPHONE ENCOUNTER
Dr. Claudia Hernandez,    Pt is calling to follow up on her concern, please see TE below, 3/10. She is concerned bec she has a 15 month old boy at home. She is still taking Augmentin for \"pus and inflammation of appendix. \"     She will have follow up appt with surgeon tomorrow. Please advise on bx results. Thank you.

## 2023-03-14 ENCOUNTER — MED REC SCAN ONLY (OUTPATIENT)
Dept: FAMILY MEDICINE CLINIC | Facility: CLINIC | Age: 43
End: 2023-03-14

## 2023-03-14 NOTE — TELEPHONE ENCOUNTER
Dr. Sebastian Giraldo,    Please advise on message below. Does the pt need h. Pylori treatment? Thank you.

## 2023-03-15 ENCOUNTER — PATIENT MESSAGE (OUTPATIENT)
Facility: CLINIC | Age: 43
End: 2023-03-15

## 2023-03-15 RX ORDER — AMOXICILLIN 500 MG/1
1000 TABLET, FILM COATED ORAL 2 TIMES DAILY
Qty: 56 TABLET | Refills: 0 | Status: SHIPPED | OUTPATIENT
Start: 2023-03-15 | End: 2023-03-29

## 2023-03-15 RX ORDER — OMEPRAZOLE 20 MG/1
20 CAPSULE, DELAYED RELEASE ORAL
Qty: 28 CAPSULE | Refills: 0 | Status: SHIPPED | OUTPATIENT
Start: 2023-03-15 | End: 2023-03-29

## 2023-03-15 RX ORDER — LEVOFLOXACIN 500 MG/1
500 TABLET, FILM COATED ORAL EVERY 24 HOURS
Qty: 14 TABLET | Refills: 0 | Status: SHIPPED | OUTPATIENT
Start: 2023-03-15

## 2023-03-15 NOTE — TELEPHONE ENCOUNTER
Spoke to patient, reports she is currently taking a 10 day Amoxicillin Clavulanate 875-125mg course due to her appendix. Was prescribed by her PCP. States she will be done with course on Sunday. Would you like patient to complete this antibiotic first? Or is it ok for her to start H. Pylori treatment at the same time? Please advise. Thank you.

## 2023-03-16 ENCOUNTER — MED REC SCAN ONLY (OUTPATIENT)
Dept: GASTROENTEROLOGY | Facility: CLINIC | Age: 43
End: 2023-03-16

## 2023-03-16 NOTE — TELEPHONE ENCOUNTER
From: Nikki Little  To: Renetta Vasquez MD  Sent: 3/15/2023 2:55 PM CDT  Subject: [address] [place]. Pylori    Dr. Hansen Greenhouse been calling and leaving messages since last Friday March 10th. I tested positive for H. Pylori so   I am wondering if I need to start antibiotics. Currently I am taking Amoxicillin and Clavulanate Potassium tablets 875mg/125mg twice a day for 10 days. Dr. Flanagan Elsie prescribed them for the inflammation and pus in my appendix. Not sure if this fights the stomach infection as well. Also attached is a picture of a piece of gauze or tissue that came out when I had a bowel movement.

## 2023-03-16 NOTE — TELEPHONE ENCOUNTER
Dr. Juni Thibodeaux,    Please see picture of tissue that pt sent in [facility] [address]. Thank you. Below message regarding h. Pylori treatment already addressed in other encounter.

## 2023-03-17 ENCOUNTER — TELEPHONE (OUTPATIENT)
Facility: CLINIC | Age: 43
End: 2023-03-17

## 2023-03-17 NOTE — TELEPHONE ENCOUNTER
Lexis Zapien MD  P  Gi Clinical Staff  GI staff: please place recall for colonoscopy in 3 years     Hpylori treatment sent   Following up with surgery   Follow up in the office in 6 months to 1 year depending on symptoms

## 2023-03-17 NOTE — TELEPHONE ENCOUNTER
Pt is calling to see what was the OTC medication she had to purchase for the H-Pylori medication and she is wanting to go over how many times per day she is to take it. Pt is also wondering if she takes antibiotics together, will they counter act each other, or is it okay to take both at the same time.

## 2023-03-17 NOTE — TELEPHONE ENCOUNTER
Called patient,  verified. Below questions answered. Instructed to buy otc pepto bismul in addtion to omeprazole and 2 antibiotics. Patient verbalized understanding, no further concerns, issues at this time.

## 2023-03-18 NOTE — TELEPHONE ENCOUNTER
Already replied to a telephone message about this  She is following up with surgery for possible appendicitis  She should complete her 10 day course of abx and then start Hpylori treatment  Not emergent  There was no gauze ever put in the colon. May have been with wiping and was tissues that was external or when she was wiping on the outside.

## 2023-03-20 NOTE — TELEPHONE ENCOUNTER
Dr. Chevy Mendez,    Pt stated that the white tissue in the picture was the one that came out from her rectum followed by a \"fingernail size tissue of same consistency\" after she moved her bowels on March 15. Overall, she feels better today. Thank you.

## 2023-03-20 NOTE — TELEPHONE ENCOUNTER
Discussed with patient  Feeling well  Saw Dr. Chavez Neither and reviewed note  Planning conservative management at this time

## 2023-03-29 ENCOUNTER — TELEPHONE (OUTPATIENT)
Dept: SURGERY | Facility: CLINIC | Age: 43
End: 2023-03-29

## 2023-03-29 NOTE — TELEPHONE ENCOUNTER
Pt was told to keep any pieces of kidney stones she was able to collect - asking if she brings them in now or keeps them until her upcoming appt to bring in then

## 2023-04-03 ENCOUNTER — OFFICE VISIT (OUTPATIENT)
Dept: SURGERY | Facility: CLINIC | Age: 43
End: 2023-04-03
Payer: COMMERCIAL

## 2023-04-03 VITALS
DIASTOLIC BLOOD PRESSURE: 70 MMHG | OXYGEN SATURATION: 98 % | SYSTOLIC BLOOD PRESSURE: 102 MMHG | HEART RATE: 90 BPM | HEIGHT: 61.3 IN | BODY MASS INDEX: 30.94 KG/M2 | WEIGHT: 166 LBS

## 2023-04-03 DIAGNOSIS — Z86.32 HX GESTATIONAL DIABETES: ICD-10-CM

## 2023-04-03 DIAGNOSIS — K76.0 FATTY LIVER: ICD-10-CM

## 2023-04-03 DIAGNOSIS — E78.5 DYSLIPIDEMIA: Primary | ICD-10-CM

## 2023-04-03 DIAGNOSIS — E66.9 OBESITY (BMI 30-39.9): ICD-10-CM

## 2023-04-03 DIAGNOSIS — N20.0 RECURRENT KIDNEY STONES: ICD-10-CM

## 2023-04-03 NOTE — PATIENT INSTRUCTIONS
*Longevity*    Aim for:   5 fruits and vegetable servings each day off of the [facility] for [employer].     30 minute walk, 5-6 days/week as tolerated. Exercise in ways you love. (150-300 minutes/week). 2-3 days of strength training per week. The New American or Healthy Plate Methods. Keep meals between 7 am and 7 pm.     Track food and beverage intake using My Fitness Pal or Lose It. Meet dietician:   Protein: grams/day. Carbs: grams/day. Aim for 2 healthy fats/day. Take 250-500 mg magnesium glycinate each evening to improve sleep as needed. Now, Doctor's Best, Pure Encapsulations, Life Extension are good brands. Magnesium salt baths weekly. 1 cup epsom salt; 1/2 cup baking soda. Write 3 things great things that happened and that you're grateful for each night before bed. A diet filled with a variety of vegetables, fruits, whole grains, beans, and other plant foods helps lower risk for many cancers. These include: apples, asparagus, blueberries, broccoli/crucifers, brussels sprouts, carrots, cauliflower, cherries, coffee, cranberries, flaxseed, garlic, grapefruit, grapes, kale, pulses (beans, peas, lentils), raspberries, soy, spinach, squash, strawberries, tea tomatoes, walnuts, whole grains (AICR, 2020). Other recommendations:   I recommend a whole food, plant powered diet with low glycemic index:     Aim for 3 meals a day and 1-2 snacks as needed. Aim for a protein + produce at each meal time. Breakfast ideas:  1. Fruit and nuts/seeds. 2. Eggs scrambled with vegetables. 3. Oatmeal (stovetop), cinnamon, 2 tbsp flaxseed, berries, nuts. 4. Protein shake + fruit. (1 scoop with water/ice). [place] [address], [place], [place], and [place] are good brands. Snacks ideas:  Raw vegetables and hummus, apples and peanut butter, nuts, seeds, fruit, pecans drizzled with organic honey.       Use the Healthy Plate method for lunch and dinner:  1/2 right side of plate non-starchy vegetables. Bottom left 1/4 plate protein. Top left 1/4 starch as desired. Or Use the new American Plate Method for lunch and dinner:  1/3 animal protein/product  2/3 plants- fruits, vegetables, whole grains, legumes, nuts, seeds      Aim for a total of:  2 fruits a day (avocado, tomato, citrus/oranges, apples, berries)  1/2 cup or medium size    4 non-starchy vegetables (handful) (greens, peppers, onions, garlic, broccoli, cauliflower, etc.)    0-2 starches (oatmeal, sweet potato, carrots, brown rice, etc). 3 protein per day (fish, seafood, meat, or plants: salmon, nuts, seeds, shrimp, chicken, turkey, beans, lentils, chickpeas, etc). 2 healthy fats (avocado, avocado oil, olives, olive oil, salmon, nuts, seeds)    References     [facility] for [employer] (Fredrich Baumgarten).  (2020). AICR's foods that fight cancer.  Retrieved from http://www.delgado.net/

## 2023-04-14 ENCOUNTER — HOSPITAL ENCOUNTER (OUTPATIENT)
Dept: CT IMAGING | Facility: HOSPITAL | Age: 43
Discharge: HOME | End: 2023-04-14
Attending: UROLOGY
Payer: COMMERCIAL

## 2023-04-14 DIAGNOSIS — N20.0 NEPHROLITHIASIS: ICD-10-CM

## 2023-04-14 PROCEDURE — 74176 CT ABD & PELVIS W/O CONTRAST: CPT | Performed by: UROLOGY

## 2023-04-17 ENCOUNTER — TELEPHONE (OUTPATIENT)
Dept: SURGERY | Facility: CLINIC | Age: 43
End: 2023-04-17

## 2023-04-17 ENCOUNTER — OFFICE VISIT (OUTPATIENT)
Dept: SURGERY | Facility: CLINIC | Age: 43
End: 2023-04-17

## 2023-04-17 ENCOUNTER — APPOINTMENT (OUTPATIENT)
Dept: LAB | Facility: HOSPITAL | Age: 43
End: 2023-04-17
Attending: UROLOGY
Payer: COMMERCIAL

## 2023-04-17 DIAGNOSIS — N20.0 NEPHROLITHIASIS: Primary | ICD-10-CM

## 2023-04-17 DIAGNOSIS — N20.0 KIDNEY STONES: Primary | ICD-10-CM

## 2023-04-17 PROCEDURE — 99214 OFFICE O/P EST MOD 30 MIN: CPT | Performed by: UROLOGY

## 2023-04-17 NOTE — TELEPHONE ENCOUNTER
Spoke with patient, scheduled Cystoscopy left ureteroscopy, laser lithotripsy, retrograde pyelogram, stent placement, Thursday 05/18/2023, [facility]/outptient, went over pre-op/lab instructions, all sent to patient' my chart.

## 2023-04-17 NOTE — TELEPHONE ENCOUNTER
Hi!  This patient needs a ureteroscopy. Should be booked for 1.5 hours in the next 1-4 weeks. Needs labs as ordered below. Thanks! [address]    Urology Surgery Scheduling Request    Location: [facility]    Surgeon: Javier Crenshaw MD    Asst. Surgeon: N/A    Diagnosis: Nephrolithiasis    Procedure: Cystoscopy left ureteroscopy, laser lithotripsy, retrograde pyelogram, stent placement     Anesthesia: General     Time Frame: 1-4 weeks    Time needed: 1.5 hours    Special Equipment: Holmium Laser    On Call to OR: ancef    Admission: Day Surgery    Pre-op Testing: CBC, CMP, PT/INR and Urine Culture     Need Pre-op Clearance: none    Estimated Post Op/Follow Up Appt: tbd.     Salima Harmon MD

## 2023-04-18 DIAGNOSIS — N20.0 KIDNEY STONE: Primary | ICD-10-CM

## 2023-04-18 PROCEDURE — 82365 CALCULUS SPECTROSCOPY: CPT

## 2023-04-22 LAB
CAOX DIHYDRATE: 100 %
WEIGHT-STONE: 10 MG

## 2023-04-24 ENCOUNTER — TELEPHONE (OUTPATIENT)
Dept: INTEGRATIVE MEDICINE | Facility: CLINIC | Age: 43
End: 2023-04-24

## 2023-04-24 ENCOUNTER — OFFICE VISIT (OUTPATIENT)
Dept: INTEGRATIVE MEDICINE | Facility: CLINIC | Age: 43
End: 2023-04-24
Payer: COMMERCIAL

## 2023-04-24 VITALS — BODY MASS INDEX: 31 KG/M2 | WEIGHT: 165 LBS

## 2023-04-24 DIAGNOSIS — N20.0 RECURRENT KIDNEY STONES: ICD-10-CM

## 2023-04-24 DIAGNOSIS — E78.5 DYSLIPIDEMIA: ICD-10-CM

## 2023-04-24 DIAGNOSIS — K76.0 FATTY LIVER: ICD-10-CM

## 2023-04-24 DIAGNOSIS — E66.9 OBESITY (BMI 30.0-34.9): ICD-10-CM

## 2023-04-24 DIAGNOSIS — Z71.3 ENCOUNTER FOR WEIGHT LOSS COUNSELING: Primary | ICD-10-CM

## 2023-04-24 DIAGNOSIS — Z86.32 HX GESTATIONAL DIABETES: ICD-10-CM

## 2023-04-24 NOTE — PATIENT INSTRUCTIONS
[facility]'s virtual dispensary, powered by Mary Anne Neville    We are proud to offer you a trusted source to purchase your recommended vitamins and supplements at an exclusive 10% discount. To access our catalog of recommended products, please go to [email or web address] and select the \"Log In\" and then use your email address to complete creating your personal account. Please call Mary Anne Neville at [phone], if you need direct help. Dr. Oralia Mendez created this unique formula with 30 billion CFU of beneficial probiotics made from diverse raw probiotic strains that are resistant to stomach acid and bile for daily digestive and immune system support. *

## 2023-04-24 NOTE — TELEPHONE ENCOUNTER
Pt called and stated the portal information for [facility] [place] she received from Alma Coleman is not working.

## 2023-05-08 ENCOUNTER — LAB ENCOUNTER (OUTPATIENT)
Dept: LAB | Facility: HOSPITAL | Age: 43
End: 2023-05-08
Attending: UROLOGY
Payer: COMMERCIAL

## 2023-05-08 DIAGNOSIS — N20.0 KIDNEY STONES: ICD-10-CM

## 2023-05-08 LAB
ANION GAP SERPL CALC-SCNC: 6 MMOL/L (ref 0–18)
BUN BLD-MCNC: 14 MG/DL (ref 7–18)
BUN/CREAT SERPL: 19.7 (ref 10–20)
CALCIUM BLD-MCNC: 9.4 MG/DL (ref 8.5–10.1)
CHLORIDE SERPL-SCNC: 111 MMOL/L (ref 98–112)
CO2 SERPL-SCNC: 25 MMOL/L (ref 21–32)
CREAT BLD-MCNC: 0.71 MG/DL
DEPRECATED RDW RBC AUTO: 48.7 FL (ref 35.1–46.3)
ERYTHROCYTE [DISTWIDTH] IN BLOOD BY AUTOMATED COUNT: 14.3 % (ref 11–15)
FASTING STATUS PATIENT QL REPORTED: YES
GFR SERPLBLD BASED ON 1.73 SQ M-ARVRAT: 109 ML/MIN/1.73M2 (ref 60–?)
GLUCOSE BLD-MCNC: 99 MG/DL (ref 70–99)
HCT VFR BLD AUTO: 42.3 %
HGB BLD-MCNC: 13.1 G/DL
INR BLD: 0.99 (ref 0.85–1.16)
MCH RBC QN AUTO: 28.4 PG (ref 26–34)
MCHC RBC AUTO-ENTMCNC: 31 G/DL (ref 31–37)
MCV RBC AUTO: 91.8 FL
OSMOLALITY SERPL CALC.SUM OF ELEC: 295 MOSM/KG (ref 275–295)
PLATELET # BLD AUTO: 256 10(3)UL (ref 150–450)
POTASSIUM SERPL-SCNC: 4.1 MMOL/L (ref 3.5–5.1)
PROTHROMBIN TIME: 13 SECONDS (ref 11.6–14.8)
RBC # BLD AUTO: 4.61 X10(6)UL
SODIUM SERPL-SCNC: 142 MMOL/L (ref 136–145)
WBC # BLD AUTO: 6.4 X10(3) UL (ref 4–11)

## 2023-05-08 PROCEDURE — 80048 BASIC METABOLIC PNL TOTAL CA: CPT

## 2023-05-08 PROCEDURE — 85027 COMPLETE CBC AUTOMATED: CPT

## 2023-05-08 PROCEDURE — 36415 COLL VENOUS BLD VENIPUNCTURE: CPT

## 2023-05-08 PROCEDURE — 85610 PROTHROMBIN TIME: CPT

## 2023-05-08 PROCEDURE — 87086 URINE CULTURE/COLONY COUNT: CPT

## 2023-05-09 ENCOUNTER — PATIENT MESSAGE (OUTPATIENT)
Dept: SURGERY | Facility: CLINIC | Age: 43
End: 2023-05-09

## 2023-05-09 ENCOUNTER — HOSPITAL ENCOUNTER (OUTPATIENT)
Dept: GENERAL RADIOLOGY | Age: 43
Discharge: HOME | End: 2023-05-09
Attending: NURSE PRACTITIONER
Payer: COMMERCIAL

## 2023-05-09 DIAGNOSIS — R19.8 CHANGE IN BOWEL MOVEMENT: ICD-10-CM

## 2023-05-09 DIAGNOSIS — K59.00 CONSTIPATION, UNSPECIFIED CONSTIPATION TYPE: ICD-10-CM

## 2023-05-09 DIAGNOSIS — Z86.010 PERSONAL HISTORY OF COLONIC POLYPS: ICD-10-CM

## 2023-05-09 DIAGNOSIS — R19.7 DIARRHEA, UNSPECIFIED TYPE: ICD-10-CM

## 2023-05-09 DIAGNOSIS — K25.3: ICD-10-CM

## 2023-05-09 DIAGNOSIS — B96.81: ICD-10-CM

## 2023-05-09 PROCEDURE — 74018 RADEX ABDOMEN 1 VIEW: CPT | Performed by: NURSE PRACTITIONER

## 2023-05-10 ENCOUNTER — TELEPHONE (OUTPATIENT)
Dept: SURGERY | Facility: CLINIC | Age: 43
End: 2023-05-10

## 2023-05-10 RX ORDER — SULFAMETHOXAZOLE AND TRIMETHOPRIM 800; 160 MG/1; MG/1
1 TABLET ORAL 2 TIMES DAILY
Qty: 12 TABLET | Refills: 0 | Status: SHIPPED | OUTPATIENT
Start: 2023-05-10 | End: 2023-05-16

## 2023-05-10 NOTE — TELEPHONE ENCOUNTER
S/W pt and told her that I placed the urine C&S results on AR's desk for her to review and we will c/b once she responds.

## 2023-05-10 NOTE — TELEPHONE ENCOUNTER
Pt has procedure on 5/18- just got positive urine culture back - needs to start on antibiotics today

## 2023-05-10 NOTE — TELEPHONE ENCOUNTER
AR placed the results on my station with written orders stating that she prescribed Bactrim DS to start 3 days before surgery, save the rest for after surgery. I noted that Lalit Fuentes sent a script to pt's Juli for Bactrim DS 1 tab by mouth 2 times daily for 6 days, # 12, no refills.

## 2023-05-15 ENCOUNTER — TELEPHONE (OUTPATIENT)
Dept: SURGERY | Facility: CLINIC | Age: 43
End: 2023-05-15

## 2023-05-15 NOTE — TELEPHONE ENCOUNTER
S/W pt and determined that starting Saturday morning she noticed bright red blood in her urine that has continued intermittently since and in small amts. She denies Lt. Flank pain, fever and chills, or N/V. Schd for surgery 5/18 on that 5 mm Lt ureteral stone. I told her that the stone may be trying to pass. I will let AR know and she should call if any worsening changes.

## 2023-05-16 RX ORDER — NICOTINE POLACRILEX 4 MG
15 LOZENGE BUCCAL
Status: DISCONTINUED | OUTPATIENT
Start: 2023-05-16 | End: 2023-05-16

## 2023-05-16 RX ORDER — DEXTROSE MONOHYDRATE 25 G/50ML
50 INJECTION, SOLUTION INTRAVENOUS
Status: DISCONTINUED | OUTPATIENT
Start: 2023-05-16 | End: 2023-05-16

## 2023-05-16 RX ORDER — NICOTINE POLACRILEX 4 MG
30 LOZENGE BUCCAL
Status: DISCONTINUED | OUTPATIENT
Start: 2023-05-16 | End: 2023-05-16

## 2023-05-17 NOTE — PROGRESS NOTES
PRE-OP NOTE     NAME/MRN/PROCEDURE: Eloise Thomas [id number] - cystoscopy, LEFT ureteroscopy, laser lithotripsy, stent placement  HPI: 5mm proximal left ureteral stone which has not moved distally over the past 6 weeks. Will plan for cystoscopy with definitive ureteroscopy. She also has right sided stones (lower pole) that we will defer treating for now.   PMH: asthma, fatty liver, gestational diabetes, high cholesterol, anemia, migraines, kidney stones  PSH: Colonoscopy, wisdom teeth removal  MEDS: Albuterol, budesonide, montelukast, PEG, prenatal vitamin  ALL: No known allergies  LABS: Urine culture less than 10,000 gram-positive kendra, INR 0.99, creatinine 0.71, hematocrit 42.3, platelets 671  IMAGING: , 5.6cm, 26-28cm    OTHER:   CONSTITUTIONAL: No apparent distress, cooperative and communicative  NEUROLOGIC: Alert and oriented   HEAD: Normocephalic, atraumatic   EYES: Sclera non-icteric   ENT: Hearing intact, moist mucous membranes   NECK: No obvious goiter or masses   RESPIRATORY: Normal respiratory effort, Nonlabored breathing on room air  SKIN: No evident rashes   ABDOMEN: Soft, nontender, nondistended, no rebound tenderness, no guarding, no masses  ABX: Ancef

## 2023-05-18 ENCOUNTER — ANESTHESIA (OUTPATIENT)
Dept: SURGERY | Facility: HOSPITAL | Age: 43
End: 2023-05-18
Payer: COMMERCIAL

## 2023-05-18 ENCOUNTER — HOSPITAL ENCOUNTER (OUTPATIENT)
Facility: HOSPITAL | Age: 43
Setting detail: HOSPITAL OUTPATIENT SURGERY
Discharge: HOME | End: 2023-05-18
Attending: UROLOGY | Admitting: UROLOGY
Payer: COMMERCIAL

## 2023-05-18 ENCOUNTER — APPOINTMENT (OUTPATIENT)
Dept: GENERAL RADIOLOGY | Facility: HOSPITAL | Age: 43
End: 2023-05-18
Attending: UROLOGY
Payer: COMMERCIAL

## 2023-05-18 ENCOUNTER — TELEPHONE (OUTPATIENT)
Dept: SURGERY | Facility: CLINIC | Age: 43
End: 2023-05-18

## 2023-05-18 ENCOUNTER — ANESTHESIA EVENT (OUTPATIENT)
Dept: SURGERY | Facility: HOSPITAL | Age: 43
End: 2023-05-18
Payer: COMMERCIAL

## 2023-05-18 VITALS
DIASTOLIC BLOOD PRESSURE: 70 MMHG | SYSTOLIC BLOOD PRESSURE: 139 MMHG | RESPIRATION RATE: 18 BRPM | WEIGHT: 166 LBS | TEMPERATURE: 99 F | HEART RATE: 78 BPM | OXYGEN SATURATION: 96 % | HEIGHT: 61 IN | BODY MASS INDEX: 31.34 KG/M2

## 2023-05-18 DIAGNOSIS — N20.0 NEPHROLITHIASIS: ICD-10-CM

## 2023-05-18 LAB — B-HCG UR QL: NEGATIVE

## 2023-05-18 PROCEDURE — 0TC78ZZ EXTIRPATION OF MATTER FROM LEFT URETER, VIA NATURAL OR ARTIFICIAL OPENING ENDOSCOPIC: ICD-10-PCS | Performed by: UROLOGY

## 2023-05-18 PROCEDURE — 0T778DZ DILATION OF LEFT URETER WITH INTRALUMINAL DEVICE, VIA NATURAL OR ARTIFICIAL OPENING ENDOSCOPIC: ICD-10-PCS | Performed by: UROLOGY

## 2023-05-18 PROCEDURE — 52356 CYSTO/URETERO W/LITHOTRIPSY: CPT | Performed by: UROLOGY

## 2023-05-18 PROCEDURE — 74420 UROGRAPHY RTRGR +-KUB: CPT | Performed by: UROLOGY

## 2023-05-18 PROCEDURE — BT1FYZZ FLUOROSCOPY OF LEFT KIDNEY, URETER AND BLADDER USING OTHER CONTRAST: ICD-10-PCS | Performed by: UROLOGY

## 2023-05-18 DEVICE — URETERAL STENT
Type: IMPLANTABLE DEVICE | Site: URETER | Status: FUNCTIONAL
Brand: ASCERTA™

## 2023-05-18 RX ORDER — MIDAZOLAM HYDROCHLORIDE 1 MG/ML
INJECTION INTRAMUSCULAR; INTRAVENOUS AS NEEDED
Status: DISCONTINUED | OUTPATIENT
Start: 2023-05-18 | End: 2023-05-18 | Stop reason: SURG

## 2023-05-18 RX ORDER — LIDOCAINE HYDROCHLORIDE 10 MG/ML
INJECTION, SOLUTION EPIDURAL; INFILTRATION; INTRACAUDAL; PERINEURAL AS NEEDED
Status: DISCONTINUED | OUTPATIENT
Start: 2023-05-18 | End: 2023-05-18 | Stop reason: SURG

## 2023-05-18 RX ORDER — ONDANSETRON 2 MG/ML
INJECTION INTRAMUSCULAR; INTRAVENOUS AS NEEDED
Status: DISCONTINUED | OUTPATIENT
Start: 2023-05-18 | End: 2023-05-18 | Stop reason: SURG

## 2023-05-18 RX ORDER — MORPHINE SULFATE 4 MG/ML
2 INJECTION, SOLUTION INTRAMUSCULAR; INTRAVENOUS EVERY 10 MIN PRN
Status: DISCONTINUED | OUTPATIENT
Start: 2023-05-18 | End: 2023-05-18

## 2023-05-18 RX ORDER — ROCURONIUM BROMIDE 10 MG/ML
INJECTION, SOLUTION INTRAVENOUS AS NEEDED
Status: DISCONTINUED | OUTPATIENT
Start: 2023-05-18 | End: 2023-05-18 | Stop reason: SURG

## 2023-05-18 RX ORDER — ACETAMINOPHEN 500 MG
1000 TABLET ORAL ONCE AS NEEDED
Status: DISCONTINUED | OUTPATIENT
Start: 2023-05-18 | End: 2023-05-18

## 2023-05-18 RX ORDER — DOCUSATE SODIUM 100 MG/1
100 CAPSULE, LIQUID FILLED ORAL 2 TIMES DAILY
COMMUNITY

## 2023-05-18 RX ORDER — HYDROCODONE BITARTRATE AND ACETAMINOPHEN 10; 325 MG/1; MG/1
1 TABLET ORAL ONCE AS NEEDED
Status: DISCONTINUED | OUTPATIENT
Start: 2023-05-18 | End: 2023-05-18

## 2023-05-18 RX ORDER — ONDANSETRON 2 MG/ML
4 INJECTION INTRAMUSCULAR; INTRAVENOUS EVERY 6 HOURS PRN
Status: DISCONTINUED | OUTPATIENT
Start: 2023-05-18 | End: 2023-05-18

## 2023-05-18 RX ORDER — HYDROCODONE BITARTRATE AND ACETAMINOPHEN 10; 325 MG/1; MG/1
2 TABLET ORAL ONCE AS NEEDED
Status: DISCONTINUED | OUTPATIENT
Start: 2023-05-18 | End: 2023-05-18

## 2023-05-18 RX ORDER — DEXAMETHASONE SODIUM PHOSPHATE 4 MG/ML
VIAL (ML) INJECTION AS NEEDED
Status: DISCONTINUED | OUTPATIENT
Start: 2023-05-18 | End: 2023-05-18 | Stop reason: SURG

## 2023-05-18 RX ORDER — ACETAMINOPHEN 500 MG
1000 TABLET ORAL ONCE
Status: COMPLETED | OUTPATIENT
Start: 2023-05-18 | End: 2023-05-18

## 2023-05-18 RX ORDER — FAMOTIDINE 20 MG/1
20 TABLET, FILM COATED ORAL ONCE
Status: COMPLETED | OUTPATIENT
Start: 2023-05-18 | End: 2023-05-18

## 2023-05-18 RX ORDER — CEFAZOLIN SODIUM/WATER 2 G/20 ML
2 SYRINGE (ML) INTRAVENOUS ONCE
Status: COMPLETED | OUTPATIENT
Start: 2023-05-18 | End: 2023-05-18

## 2023-05-18 RX ORDER — SODIUM CHLORIDE, SODIUM LACTATE, POTASSIUM CHLORIDE, CALCIUM CHLORIDE 600; 310; 30; 20 MG/100ML; MG/100ML; MG/100ML; MG/100ML
INJECTION, SOLUTION INTRAVENOUS CONTINUOUS
Status: DISCONTINUED | OUTPATIENT
Start: 2023-05-18 | End: 2023-05-18

## 2023-05-18 RX ORDER — HYDROMORPHONE HYDROCHLORIDE 1 MG/ML
0.6 INJECTION, SOLUTION INTRAMUSCULAR; INTRAVENOUS; SUBCUTANEOUS EVERY 5 MIN PRN
Status: DISCONTINUED | OUTPATIENT
Start: 2023-05-18 | End: 2023-05-18

## 2023-05-18 RX ORDER — NICOTINE POLACRILEX 4 MG
30 LOZENGE BUCCAL
Status: DISCONTINUED | OUTPATIENT
Start: 2023-05-18 | End: 2023-05-18

## 2023-05-18 RX ORDER — GLYCOPYRROLATE 0.2 MG/ML
INJECTION, SOLUTION INTRAMUSCULAR; INTRAVENOUS AS NEEDED
Status: DISCONTINUED | OUTPATIENT
Start: 2023-05-18 | End: 2023-05-18 | Stop reason: SURG

## 2023-05-18 RX ORDER — NICOTINE POLACRILEX 4 MG
15 LOZENGE BUCCAL
Status: DISCONTINUED | OUTPATIENT
Start: 2023-05-18 | End: 2023-05-18

## 2023-05-18 RX ORDER — MORPHINE SULFATE 4 MG/ML
4 INJECTION, SOLUTION INTRAMUSCULAR; INTRAVENOUS EVERY 10 MIN PRN
Status: DISCONTINUED | OUTPATIENT
Start: 2023-05-18 | End: 2023-05-18

## 2023-05-18 RX ORDER — HYDROMORPHONE HYDROCHLORIDE 1 MG/ML
0.4 INJECTION, SOLUTION INTRAMUSCULAR; INTRAVENOUS; SUBCUTANEOUS EVERY 5 MIN PRN
Status: DISCONTINUED | OUTPATIENT
Start: 2023-05-18 | End: 2023-05-18

## 2023-05-18 RX ORDER — PROCHLORPERAZINE EDISYLATE 5 MG/ML
5 INJECTION INTRAMUSCULAR; INTRAVENOUS EVERY 8 HOURS PRN
Status: DISCONTINUED | OUTPATIENT
Start: 2023-05-18 | End: 2023-05-18

## 2023-05-18 RX ORDER — NEOSTIGMINE METHYLSULFATE 1 MG/ML
INJECTION, SOLUTION INTRAVENOUS AS NEEDED
Status: DISCONTINUED | OUTPATIENT
Start: 2023-05-18 | End: 2023-05-18 | Stop reason: SURG

## 2023-05-18 RX ORDER — NALOXONE HYDROCHLORIDE 0.4 MG/ML
80 INJECTION, SOLUTION INTRAMUSCULAR; INTRAVENOUS; SUBCUTANEOUS AS NEEDED
Status: DISCONTINUED | OUTPATIENT
Start: 2023-05-18 | End: 2023-05-18

## 2023-05-18 RX ORDER — MORPHINE SULFATE 10 MG/ML
6 INJECTION, SOLUTION INTRAMUSCULAR; INTRAVENOUS EVERY 10 MIN PRN
Status: DISCONTINUED | OUTPATIENT
Start: 2023-05-18 | End: 2023-05-18

## 2023-05-18 RX ORDER — DEXTROSE MONOHYDRATE 25 G/50ML
50 INJECTION, SOLUTION INTRAVENOUS
Status: DISCONTINUED | OUTPATIENT
Start: 2023-05-18 | End: 2023-05-18

## 2023-05-18 RX ORDER — KETOROLAC TROMETHAMINE 10 MG/1
10 TABLET, FILM COATED ORAL EVERY 6 HOURS PRN
Qty: 10 TABLET | Refills: 0 | Status: SHIPPED | OUTPATIENT
Start: 2023-05-18

## 2023-05-18 RX ORDER — HYDROMORPHONE HYDROCHLORIDE 1 MG/ML
0.2 INJECTION, SOLUTION INTRAMUSCULAR; INTRAVENOUS; SUBCUTANEOUS EVERY 5 MIN PRN
Status: DISCONTINUED | OUTPATIENT
Start: 2023-05-18 | End: 2023-05-18

## 2023-05-18 RX ORDER — METOCLOPRAMIDE 10 MG/1
10 TABLET ORAL ONCE
Status: COMPLETED | OUTPATIENT
Start: 2023-05-18 | End: 2023-05-18

## 2023-05-18 RX ADMIN — ONDANSETRON 4 MG: 2 INJECTION INTRAMUSCULAR; INTRAVENOUS at 07:52:00

## 2023-05-18 RX ADMIN — ROCURONIUM BROMIDE 10 MG: 10 INJECTION, SOLUTION INTRAVENOUS at 07:52:00

## 2023-05-18 RX ADMIN — NEOSTIGMINE METHYLSULFATE 5 MG: 1 INJECTION, SOLUTION INTRAVENOUS at 08:42:00

## 2023-05-18 RX ADMIN — DEXAMETHASONE SODIUM PHOSPHATE 4 MG: 4 MG/ML VIAL (ML) INJECTION at 07:52:00

## 2023-05-18 RX ADMIN — LIDOCAINE HYDROCHLORIDE 50 MG: 10 INJECTION, SOLUTION EPIDURAL; INFILTRATION; INTRACAUDAL; PERINEURAL at 07:43:00

## 2023-05-18 RX ADMIN — ROCURONIUM BROMIDE 10 MG: 10 INJECTION, SOLUTION INTRAVENOUS at 07:43:00

## 2023-05-18 RX ADMIN — GLYCOPYRROLATE 0.2 MG: 0.2 INJECTION, SOLUTION INTRAMUSCULAR; INTRAVENOUS at 07:43:00

## 2023-05-18 RX ADMIN — GLYCOPYRROLATE 1 MG: 0.2 INJECTION, SOLUTION INTRAMUSCULAR; INTRAVENOUS at 08:42:00

## 2023-05-18 RX ADMIN — MIDAZOLAM HYDROCHLORIDE 2 MG: 1 INJECTION INTRAMUSCULAR; INTRAVENOUS at 07:38:00

## 2023-05-18 RX ADMIN — CEFAZOLIN SODIUM/WATER 2 G: 2 G/20 ML SYRINGE (ML) INTRAVENOUS at 07:43:00

## 2023-05-18 RX ADMIN — SODIUM CHLORIDE, SODIUM LACTATE, POTASSIUM CHLORIDE, CALCIUM CHLORIDE: 600; 310; 30; 20 INJECTION, SOLUTION INTRAVENOUS at 09:01:00

## 2023-05-18 RX ADMIN — SODIUM CHLORIDE, SODIUM LACTATE, POTASSIUM CHLORIDE, CALCIUM CHLORIDE: 600; 310; 30; 20 INJECTION, SOLUTION INTRAVENOUS at 07:43:00

## 2023-05-18 NOTE — ANESTHESIA PROCEDURE NOTES
Airway  Date/Time: 5/18/2023 7:44 AM  Urgency: Elective      General Information and Staff    Patient location during procedure: OR  Anesthesiologist: Sheryl Alba MD  Performed: anesthesiologist   Performed by: Sheryl Alba MD  Authorized by: Sheryl Alba MD      Indications and Patient Condition  Indications for airway management: anesthesia  Sedation level: deep  Preoxygenated: yes  Patient position: sniffing  Mask difficulty assessment: 1 - vent by mask    Final Airway Details  Final airway type: endotracheal airway      Successful airway: ETT  Cuffed: yes   Successful intubation technique: direct laryngoscopy  Endotracheal tube insertion site: oral  Blade: Audi  Blade size: #4  ETT size (mm): 7.0    Cormack-Lehane Classification: grade I - full view of glottis  Placement verified by: chest auscultation and capnometry   Cuff volume (mL): 7  Measured from: lips  ETT to lips (cm): 21  Number of attempts at approach: 1

## 2023-05-18 NOTE — TELEPHONE ENCOUNTER
I s/w pt's spouse as pt ins still in the recovery area and I arranged an appt for thurs. 5/25 at 9:20 am and I asked that pt arrive at 8:50 am.

## 2023-05-18 NOTE — OPERATIVE REPORT
OPERATIVE REPORT  DATE OF SURGERY: 5/18/2023  PREOPERATIVE DIAGNOSIS: left ureteral stone  POSTOPERATIVE DIAGNOSIS: same  PROCEDURE: Cystoscopy, left Ureteroscopy, laser lithotripsy of severely impacted stone, stone basketing, left retrograde pyelogram, placement of 6 x 24cm double j stent not on a string  SURGEON: Haydee Amor MD  ASSISTANT: none  ANESTHESIA: general  FLUIDS: per anesthesia  URINE OUTPUT: n/a  ESTIMATED BLOOD LOSS: 3cc  SPECIMENS: left ureter stone  CONDITION: Stable to PACU  INDICATIONS: 5mm proximal left ureteral stone which has not moved distally over the past 6 weeks. Will plan for cystoscopy with definitive ureteroscopy. She also has right sided stones (lower pole) that we will defer treating for now. PROCEDURE: The patient was met in the preoperative holding area. Appropriate informed consent was obtained and the patient was brought to the operative suite. ? Appropriate timeout was performed per hospital protocol. After the successful induction of general anesthesia, the patient was placed in the dorsal lithotomy position. ?Pressure points were meticulously padded. The patient was prepped and draped in a standard sterile fashion and IV ancef was given as preoperative prophylaxis. At this point, we passed a 22-Puerto Rican cystoscope per urethra into the bladder. ? We identified the left ureteric orifice and used fluoroscopic guidance to cannulate this with a .038 Sensor guidewire to the level of the kidney which was very difficult given that the stone was severely impacted and almost did not all the wire to pass. This was done manually as the camera head was note working. ?A dual lumen catheter was then passed into the proximal ureter and a second wire was passed through the dual lumen catheter with MULTIPLE tries given coiling of the wire proximal to the stone.  WE used a glide wire and a sensor wire and after some manipulation, we were able to pass the wire into the kidney and the dual lumen catheter was removed. We kept one wire as a safety wire and an over our working wire, we passed a 12/14-Armenian ureteral access sheath keeping one wire behind as a safety wire which did not pass given a very tight ureter. We then passed a 11/13 Turkish ureteral access sheath after dilating the ureter sequentially with ureteral dilators. We then passed a ureteroscope through the access sheath up into the kidney. The stone was seen  and was large and impacted. We brought in the 200 nanometer Track tip laser fiber on variable settings we ablated into smaller pieces. The tipless nitinol basket was used to completely extract all of the significant fragments and then we confirmed this with ureteroscopy again. ?We brought the laser back in and dusted a few more tiny fragments with the laser, but then there was nothing significant remaining. ? At this point, the laser fiber was removed and the basket was removed. ? The access sheath was removed while scoping the entire ureter without evidence of any trauma or stone. ? Using the safety wire and fluoroscopic guidance, we passed a 6-Armenian x 24 cm stent with a nice coil in the right kidney and a nice coil in the bladder, the string was not left. ?The procedure was then complete. Patient was awoken from anesthesia and taken to the recovery room in stable condition. All counts were correct x2. This case took longer than normal given severely impacted stone, narrow ureter requiring dilation and need for manual instrument use. IMPRESSION: left ureteroscopy of SEVERELY impacted stone - all stones treated. Will need stent removal.     PLAN:  1. Stent to be removed in 7-10 days  2.  Patient has abx and tamsulosin at home  3. toradol prescribed x 5 days

## 2023-05-24 LAB
CAOX DIHYDRATE: 100 %
WEIGHT-STONE: 7 MG

## 2023-05-25 ENCOUNTER — PROCEDURE (OUTPATIENT)
Dept: SURGERY | Facility: CLINIC | Age: 43
End: 2023-05-25

## 2023-05-25 VITALS — HEART RATE: 83 BPM | DIASTOLIC BLOOD PRESSURE: 70 MMHG | SYSTOLIC BLOOD PRESSURE: 123 MMHG

## 2023-05-25 DIAGNOSIS — N20.0 NEPHROLITHIASIS: Primary | ICD-10-CM

## 2023-05-25 PROCEDURE — 3074F SYST BP LT 130 MM HG: CPT | Performed by: UROLOGY

## 2023-05-25 PROCEDURE — 3078F DIAST BP <80 MM HG: CPT | Performed by: UROLOGY

## 2023-05-25 PROCEDURE — 52310 CYSTOSCOPY AND TREATMENT: CPT | Performed by: UROLOGY

## 2023-05-25 RX ORDER — CIPROFLOXACIN 500 MG/1
500 TABLET, FILM COATED ORAL ONCE
Status: COMPLETED | OUTPATIENT
Start: 2023-05-25 | End: 2023-05-25

## 2023-05-25 RX ADMIN — CIPROFLOXACIN 500 MG: 500 TABLET, FILM COATED ORAL at 09:54:00

## 2023-05-25 NOTE — PROCEDURES
Cysto stent removal procedure  note  CYSTOSCOPY STENT REMOVAL     PRE-OP DIAGNOSIS: Nephrolithiasis     POST-OP DIAGNOSIS: Same     PROCEDURE:  1. Cystoscopy, removal of left ureteral stent     SURGEON: Herson Dowd MD     ASSISTANT: none     EBL: minimal     FINDINGS:  1. Stent removed in its entirety stent , care taken please see Provation     INDICATIONS: left stent         PROCEDURE: Patient was brought to the procedure suite and an time-out was performed identifiying the patient,  and procedure being performed. The risks of the procedure were once again detailed to patient including bleeding, infection, dysuria. The patient agreed to proceed. cipro was given as antibiotic prophylaxis. Patient was placed in supine position on the table and a flexible cystoscope was inserted per urethra after lidojet had been instilled for 5 minutes. We immediately saw the ureteral stent in the bladder emanating from the left ureteral orifice. We used a stent grasper to grasp the distal coil of the stent and removed it from the body in its entirety. A picture of the intact stent was taken for documentation purposes in Provation. There were no complications after this procedure and the patient tolerated the procedure without issue.      IMPRESSION: s/p left urs on  now stent removal      We will have the patient return in 6 weeks for follow-up with a renal bladder ultrasound to ensure he does not have silent hydronephrosis     PLAN:   1. RBUS 6 weeks

## 2023-06-02 ENCOUNTER — TELEPHONE (OUTPATIENT)
Facility: CLINIC | Age: 43
End: 2023-06-02

## 2023-06-02 DIAGNOSIS — A04.8 H. PYLORI INFECTION: Primary | ICD-10-CM

## 2023-06-14 ENCOUNTER — APPOINTMENT (OUTPATIENT)
Dept: LAB | Facility: HOSPITAL | Age: 43
End: 2023-06-14
Attending: NURSE PRACTITIONER
Payer: COMMERCIAL

## 2023-06-14 PROCEDURE — 87338 HPYLORI STOOL AG IA: CPT

## 2023-06-16 ENCOUNTER — LAB ENCOUNTER (OUTPATIENT)
Dept: LAB | Facility: HOSPITAL | Age: 43
End: 2023-06-16
Attending: UROLOGY
Payer: COMMERCIAL

## 2023-06-16 ENCOUNTER — TELEPHONE (OUTPATIENT)
Dept: SURGERY | Facility: CLINIC | Age: 43
End: 2023-06-16

## 2023-06-16 DIAGNOSIS — R31.0 GROSS HEMATURIA: ICD-10-CM

## 2023-06-16 DIAGNOSIS — K29.70 HELICOBACTER PYLORI GASTRITIS: ICD-10-CM

## 2023-06-16 DIAGNOSIS — B96.81 HELICOBACTER PYLORI GASTRITIS: ICD-10-CM

## 2023-06-16 DIAGNOSIS — R19.8 GI SYMPTOMS: ICD-10-CM

## 2023-06-16 DIAGNOSIS — R10.9 LEFT FLANK PAIN: ICD-10-CM

## 2023-06-16 DIAGNOSIS — Z87.442 HISTORY OF KIDNEY STONES: ICD-10-CM

## 2023-06-16 DIAGNOSIS — Z86.010 PERSONAL HISTORY OF COLONIC POLYPS: ICD-10-CM

## 2023-06-16 DIAGNOSIS — R31.0 GROSS HEMATURIA: Primary | ICD-10-CM

## 2023-06-16 DIAGNOSIS — R19.8 RASPBERRY TONGUE: Primary | ICD-10-CM

## 2023-06-16 LAB
BILIRUB UR QL: NEGATIVE
CLARITY UR: CLEAR
COLOR UR: YELLOW
GLUCOSE UR-MCNC: NORMAL MG/DL
KETONES UR-MCNC: NEGATIVE MG/DL
LEUKOCYTE ESTERASE UR QL STRIP.AUTO: 25
NITRITE UR QL STRIP.AUTO: NEGATIVE
PH UR: 5.5 [PH] (ref 5–8)
PROT UR-MCNC: 50 MG/DL
RBC #/AREA URNS AUTO: >10 /HPF
SP GR UR STRIP: 1.03 (ref 1–1.03)
UROBILINOGEN UR STRIP-ACNC: NORMAL

## 2023-06-16 PROCEDURE — 81001 URINALYSIS AUTO W/SCOPE: CPT

## 2023-06-16 PROCEDURE — 87086 URINE CULTURE/COLONY COUNT: CPT

## 2023-06-16 NOTE — TELEPHONE ENCOUNTER
I s/w pt and determined that she has been having some diffuse abdominal pain and back pain for weeks and also had some GI issues. She noticed last night that there was some blood in her urine and the this morning she developed mild pain in her LT flank and Lt abdomen. States its a cramping/throbbing pain. She has not seen anymore blood in her urine today. Also denies fever/chills, N/V, frequency/urgency or cloudy, foul smelling urine and is not having any constipation or diarrhea. She has a renal US schd for 7/5 and is wondering if now a CT scan may be a better choice. I told pt that I would like her to submit a urine sample at the lab for UA and C&S and I will place the orders in the system and also send the msg to [address] with her concerns and requests.

## 2023-06-16 NOTE — TELEPHONE ENCOUNTER
Per pt had blood in urine last night, pt has been having pain on and off on her left side, requesting to speak to RN, would like to provide urine sample. Please call thank you.

## 2023-06-18 LAB — H PYLORI AG STL QL IA: NEGATIVE

## 2023-06-19 NOTE — TELEPHONE ENCOUNTER
Pt called back and I informed her of [facility]'s response msg as stated below and also told her that her urine culture was negative. She will call to schd the CT scan and also cxl the renal US.

## 2023-06-22 ENCOUNTER — TELEPHONE (OUTPATIENT)
Dept: SURGERY | Facility: CLINIC | Age: 43
End: 2023-06-22

## 2023-06-22 NOTE — TELEPHONE ENCOUNTER
LM for patient. DO not see documentation that patient was supposed to be seen today. Per Loc Segundo, she will remove no show charge. Will send [facility] message. PER KANDY from 6/16/23 patient is to complete CT scan.      Future Appointments   Date Time Provider Pippa Velásquez   7/5/2023  8:30 AM [facility] CT MAIN RM3 [facility] CT [facility] AT [facility]   7/21/2023 10:20 AM Miguel Davis MD [facility] HOSP & CLINCS [facility]   9/7/2023 12:45 PM Basia Mike PA-C [facility]   9/15/2023 12:40 PM GAL Aggarwal [address] [facility]   9/26/2023 11:30 AM Mal Goode MD [facility]

## 2023-06-22 NOTE — TELEPHONE ENCOUNTER
Patient received a letter today about a missed appointment at 8:20 am. Patient indicates she did not make the appointment, nor was away that an appointment was scheduled. Patient would like further details, please call at [phone].   *Patient is aware to contact billing if concerns of $40 no show fee is applied

## 2023-06-26 ENCOUNTER — PATIENT MESSAGE (OUTPATIENT)
Dept: SURGERY | Facility: CLINIC | Age: 43
End: 2023-06-26

## 2023-06-27 ENCOUNTER — TELEPHONE (OUTPATIENT)
Dept: SURGERY | Facility: CLINIC | Age: 43
End: 2023-06-27

## 2023-07-05 ENCOUNTER — LAB ENCOUNTER (OUTPATIENT)
Dept: LAB | Facility: HOSPITAL | Age: 43
End: 2023-07-05
Attending: UROLOGY
Payer: COMMERCIAL

## 2023-07-05 ENCOUNTER — HOSPITAL ENCOUNTER (OUTPATIENT)
Dept: CT IMAGING | Facility: HOSPITAL | Age: 43
Discharge: HOME | End: 2023-07-05
Attending: NURSE PRACTITIONER
Payer: COMMERCIAL

## 2023-07-05 ENCOUNTER — TELEPHONE (OUTPATIENT)
Dept: SURGERY | Facility: CLINIC | Age: 43
End: 2023-07-05

## 2023-07-05 DIAGNOSIS — Z87.442 HISTORY OF KIDNEY STONES: ICD-10-CM

## 2023-07-05 DIAGNOSIS — N20.0 NEPHROLITHIASIS: ICD-10-CM

## 2023-07-05 DIAGNOSIS — R31.0 GROSS HEMATURIA: ICD-10-CM

## 2023-07-05 DIAGNOSIS — N20.0 NEPHROLITHIASIS: Primary | ICD-10-CM

## 2023-07-05 DIAGNOSIS — R10.9 LEFT FLANK PAIN: ICD-10-CM

## 2023-07-05 LAB
ANION GAP SERPL CALC-SCNC: 6 MMOL/L (ref 0–18)
BUN BLD-MCNC: 14 MG/DL (ref 7–18)
BUN/CREAT SERPL: 20.9 (ref 10–20)
CALCIUM BLD-MCNC: 9.5 MG/DL (ref 8.5–10.1)
CHLORIDE SERPL-SCNC: 109 MMOL/L (ref 98–112)
CO2 SERPL-SCNC: 28 MMOL/L (ref 21–32)
CREAT BLD-MCNC: 0.67 MG/DL
DEPRECATED RDW RBC AUTO: 49.3 FL (ref 35.1–46.3)
ERYTHROCYTE [DISTWIDTH] IN BLOOD BY AUTOMATED COUNT: 14.7 % (ref 11–15)
FASTING STATUS PATIENT QL REPORTED: NO
GFR SERPLBLD BASED ON 1.73 SQ M-ARVRAT: 113 ML/MIN/1.73M2 (ref 60–?)
GLUCOSE BLD-MCNC: 101 MG/DL (ref 70–99)
HCT VFR BLD AUTO: 39.1 %
HGB BLD-MCNC: 12.6 G/DL
INR BLD: 0.95 (ref 0.85–1.16)
MCH RBC QN AUTO: 29.2 PG (ref 26–34)
MCHC RBC AUTO-ENTMCNC: 32.2 G/DL (ref 31–37)
MCV RBC AUTO: 90.5 FL
OSMOLALITY SERPL CALC.SUM OF ELEC: 297 MOSM/KG (ref 275–295)
PLATELET # BLD AUTO: 247 10(3)UL (ref 150–450)
POTASSIUM SERPL-SCNC: 4 MMOL/L (ref 3.5–5.1)
PROTHROMBIN TIME: 12.6 SECONDS (ref 11.6–14.8)
RBC # BLD AUTO: 4.32 X10(6)UL
SODIUM SERPL-SCNC: 143 MMOL/L (ref 136–145)
WBC # BLD AUTO: 6.4 X10(3) UL (ref 4–11)

## 2023-07-05 PROCEDURE — 85610 PROTHROMBIN TIME: CPT

## 2023-07-05 PROCEDURE — 36415 COLL VENOUS BLD VENIPUNCTURE: CPT

## 2023-07-05 PROCEDURE — 74176 CT ABD & PELVIS W/O CONTRAST: CPT | Performed by: NURSE PRACTITIONER

## 2023-07-05 PROCEDURE — 87086 URINE CULTURE/COLONY COUNT: CPT

## 2023-07-05 PROCEDURE — 80048 BASIC METABOLIC PNL TOTAL CA: CPT

## 2023-07-05 PROCEDURE — 85027 COMPLETE CBC AUTOMATED: CPT

## 2023-07-05 NOTE — TELEPHONE ENCOUNTER
Pt called stating pt is scheduled for an appointment on Monday 7-10-23. Surgery scheduled 7-13-23. Can Monday appointment be a video appointment.    Please call

## 2023-07-05 NOTE — TELEPHONE ENCOUNTER
Hi!  This patient needs a ureteroscopy. Should be booked for 2 hours on 7/13/2023 weeks. Needs labs as ordered below. Thanks! [facility]    Urology Surgery Scheduling Request    Location: [address] [place] [address] OR    Surgeon: Radha Murillo MD    Asst. Surgeon: N/A    Diagnosis: Nephrolithiasis    Procedure: Cystoscopy right ureteroscopy, laser lithotripsy, retrograde pyelogram, stent placement     Anesthesia: General     Time Frame: July 13    Time needed: 2    Special Equipment: Holmium Laser    On Call to OR: ancef    Admission: Day Surgery    Pre-op Testing: CBC, CMP, PT/INR and Urine Culture     Need Pre-op Clearance: none    Estimated Post Op/Follow Up Appt: tbd.     Nathalie Garcia MD

## 2023-07-05 NOTE — TELEPHONE ENCOUNTER
Called the patient and confirmed her full name and . I read Dr. Mannie Armstrong message to her. Pt is unable to come on Friday, per AR made an appointment for Monday morning at 8 am. Also asked the patient to come to the lab today and give a urine sample and she said she would. Pt stated she had this surgery on the right side about a month ago. Pt also stated that something was found on her lungs on the CT scan and that she will send Dr. Yessenia Pavon a [employer] message. Pt stated that she had no further questions.     Encounter closed

## 2023-07-05 NOTE — TELEPHONE ENCOUNTER
Per pt had a ct scan today and feels she has a uti. Per pt was instructed to request a urinalysis.  Please advise

## 2023-07-05 NOTE — TELEPHONE ENCOUNTER
Dr ELIZABETH reviewed CT scan, gave orders to call pt and inform her that need to see her Friday 7/7/23 at 11:00 am, ok to over book, but she may have to wait. Per RA inform pt she has stone on right side, increase water intake if fever go to ER. On Friday will discuss surgery and RA is saving spot in OR for 7/13/23. To inform pt someone may call her to schedule surgery on 7/13/23 and advise pt to accept, only to save spot on OR on 7/13/23. On Friday after visit, pt can reschedule or cancel. _called pt, LMTCHUMBERTO, provided contact information and phone [phone]. Outcome pending.

## 2023-07-05 NOTE — TELEPHONE ENCOUNTER
Called the patient and left a message saying that we no longer do video calls and to please let us know whether she can make it to her appointment on Monday.

## 2023-07-06 ENCOUNTER — TELEPHONE (OUTPATIENT)
Facility: CLINIC | Age: 43
End: 2023-07-06

## 2023-07-06 NOTE — TELEPHONE ENCOUNTER
Patient called phone service on 7/05/2023 at 4:43pm stated that she would like for Dr. Francisco Loyola to look over CT Scans, patient stated she is having surgery next Thursday 7/13/2023, there is a note about lungs based on the CT Scan done. Patient also stated she wants to address this urgently. Please follow up with patient.

## 2023-07-06 NOTE — TELEPHONE ENCOUNTER
I already addressed this and responded to her via MyChart as the findings are non-urgent and not concerning.

## 2023-07-06 NOTE — TELEPHONE ENCOUNTER
See MyChart 7/5/23. Already addressed. July 5, 2023  Anthony Vargas, DO   to Sheila Gonzalez and proxy (Marina Orr)       7/5/23  9:39 PM  Bhupinder Schulte,     I reviewed your CT scan results and see nothing of concern in the lungs. Atelectasis is a very common finding and is typically associated with not expanding the lungs fully, but certainly not anything that would preclude you from surgery or require a visit to a pulmonologist. I would look into purchasing something called an incentive spirometer, which you can find online, and start using it before and after surgery to help improve this. Let me know if you have any other questions and all the best,     Dr. Carin Mcghee read by Sheila Gonzalez at  8:20 AM on 7/6/2023. Last read by Marina Orr at 10:05 PM on 7/5/2023.

## 2023-07-06 NOTE — TELEPHONE ENCOUNTER
Dr Giovana Kohli =see Valentina's note below,thanks.     Future Appointments   Date Time Provider Pippa Christen   7/10/2023  8:00 AM Moe Plasencia MD [facility] OF THE [facility]   7/21/2023 10:20 AM Moe Plasencia MD [facility]   8/14/2023  2:30 PM Samuel Fabian DO [facility] 14 FP [facility] 10 OP   9/7/2023 12:45 PM Reji Barboza [facility]   9/15/2023 12:40 PM Reather Riedel, APRN [facility] OF THE [facility]   9/26/2023 11:30 AM Jasson Orozco MD [facility]

## 2023-07-10 ENCOUNTER — OFFICE VISIT (OUTPATIENT)
Dept: SURGERY | Facility: CLINIC | Age: 43
End: 2023-07-10

## 2023-07-10 DIAGNOSIS — N20.0 NEPHROLITHIASIS: Primary | ICD-10-CM

## 2023-07-10 PROCEDURE — 99214 OFFICE O/P EST MOD 30 MIN: CPT | Performed by: UROLOGY

## 2023-07-10 NOTE — PROGRESS NOTES
Joleen Ceja MD  Department of Urology  [facility]  George Gunn    T: [phone]  F: [phone]    To: Armen Ramachandran [facility] [address] Sh[address] Sinclair [phone]    Re: Oumar Reyes   MRN: [id number]  : 3/3/1980    Dear Ethan Jarquin DO,    Today I had the pleasure of seeing Oumar Reyes in my clinic. As you know, Ms. Morgan العراقي is a pleasant 41year old year old female who I am seeing for nephrolithiasis. Patient was last seen in this department on 2023. Briefly, patient has followed with me for nephrolithiasis since 2022. In May 2023 she underwent left ureteroscopy laser lithotripsy. Please note the stone was severely impacted. Stone analysis did return calcium oxalate dihydrate 100%. She had a known stone on the right side which was being monitored. Recently she developed right-sided pain and underwent a CT scan that demonstrated a 1 x 0.6 5.7 cm calculus in the right renal pelvis causing mild right-sided hydroureteronephrosis. She did have a urine culture which was negative, INR which was 0.95, creatinine 0.67, white blood cell count 6.4, hematocrit 39.1 and a UA with negative nitrites. Today she states that she is having some right-sided discomfort but denies any fevers or chills.        PAST MEDICAL HISTORY:  Past Medical History:   Diagnosis Date    Anxiety     Asthma     Calculus of kidney     Environmental allergies     Fatty liver 2023    Gestational diabetes     High cholesterol 2022    History of anemia     History of migraine     Kidney stone 2022    Migraines     Recurrent kidney stones 2023        PAST SURGICAL HISTORY:  Past Surgical History:   Procedure Laterality Date    COLONOSCOPY N/A 2023    Procedure: COLONOSCOPY;  Surgeon: Jose Mejia MD;  Location: [facility] ENDO    CYSTOSCOPY,INSERT URETERAL STENT      WISDOM TEETH REMOVED      at age 14          ALLERGIES:  No Known Allergies MEDICATIONS:  Current Outpatient Medications   Medication Instructions    albuterol 108 (90 Base) MCG/ACT Inhalation Aero Soln 2 puffs, Inhalation, Every 6 hours PRN    Blood Glucose Monitoring Suppl ([address]) w/Device Does not apply Kit 1 kit, Does not apply, 2 times daily, May substitute per insurance formulary    Budesonide (PULMICORT FLEXHALER) 90 MCG/ACT Inhalation Aerosol Powder, Breath Activated 1 puff, Inhalation, 2 times daily PRN    docusate sodium (COLACE) 100 mg, Oral, 2 times daily    montelukast (SINGULAIR) 10 mg, Oral, Nightly    prenatal vitamin w/DHA 27-0.8-228 MG Oral Cap 1 capsule, Oral, Daily        FAMILY HISTORY:  Family History   Problem Relation Age of Onset    Bipolar Disorder Mother     Diabetes Father     Glaucoma Father     Breast Cancer Maternal Grandmother 39    Other (bone cancer) Maternal Grandmother     No Known Problems Maternal Grandfather     Diabetes Paternal Grandmother     Heart Disease Paternal Grandfather     Blindness Paternal Grandfather     Heart Disease Maternal Aunt 36    Heart Disease Maternal Aunt 36    Breast Cancer Paternal Cousin 45        twin sisters (unsure of BRCA status)    Breast Cancer Paternal Cousin 45        twin sisters (unsure of BRCA status)    No Known Problems Brother          of meningitis    Breast Cancer Maternal Aunt     Dementia Maternal Aunt     Other (Other) Maternal Aunt         eye cancer     Heart Disease Maternal Aunt     Diabetes Paternal Aunt     Ovarian Cancer Neg     Colon Cancer Neg         SOCIAL HISTORY:  Social History     Socioeconomic History    Marital status:    Occupational History    Occupation: [employer]   Tobacco Use    Smoking status: Never     Passive exposure: Never    Smokeless tobacco: Never   Vaping Use    Vaping Use: Never used   Substance and Sexual Activity    Alcohol use: Not Currently    Drug use: Never    Sexual activity: Yes     Partners: Male   Other Topics Concern    Blood Transfusions No   Social History Narrative    Live with  Isaias Rodriguez)    No h/o abuse          PHYSICAL EXAMINATION:  There were no vitals filed for this visit. CONSTITUTIONAL: No apparent distress, cooperative and communicative  NEUROLOGIC: Alert and oriented   HEAD: Normocephalic, atraumatic   EYES: Sclera non-icteric   ENT: Hearing intact, moist mucous membranes   NECK: No obvious goiter or masses   RESPIRATORY: Normal respiratory effort, Nonlabored breathing on room air  SKIN: No evident rashes   ABDOMEN: Soft, nontender, nondistended, no rebound tenderness, no guarding, no masses    REVIEW OF SYSTEMS:    A comprehensive 10-point review of systems was completed. Pertinent positives and negatives are noted in the the HPI. LABORATORY DATA:  Urine Color  Yellow Yellow   Clarity Urine  Clear Clear   Spec Gravity  1.005 - 1.030 1.028   Glucose Urine  Normal mg/dL Normal   Bilirubin Urine  Negative Negative   Ketones Urine  Negative mg/dL Negative   Blood Urine  Negative 3+ Abnormal    pH Urine  5.0 - 8.0 5.5   Protein Urine  Negative, Trace mg/dL 50 Abnormal    Urobilinogen Urine  Normal Normal   Nitrite Urine  Negative Negative   Leukocyte Esterase Urine  Negative 25 Abnormal    Comment:  Irving/uL Interpretation  25          Trace  75          1+  250         2+  500         3+   WBC Urine  0 - 5 /HPF 6-10 Abnormal    RBC Urine  0 - 2 /HPF >10 Abnormal    Bacteria Urine  None Seen /HPF Rare Abnormal    Squamous Epi.  Cells  None Seen /HPF Few Abnormal             Component  Ref Range & Units 7/5/23  4:31 PM   WBC  4.0 - 11.0 x10(3) uL 6.4   RBC  3.80 - 5.30 x10(6)uL 4.32   HGB  12.0 - 16.0 g/dL 12.6   HCT  35.0 - 48.0 % 39.1   MCV  80.0 - 100.0 fL 90.5   MCH  26.0 - 34.0 pg 29.2   MCHC  31.0 - 37.0 g/dL 32.2   RDW  11.0 - 15.0 % 14.7   RDW-SD  35.1 - 46.3 fL 49.3 High    PLT  150.0 - 450.0 10(3)uL 247.0            Component  Ref Range & Units 7/5/23  4:31 PM   Glucose  70 - 99 mg/dL 101 High    Sodium  136 - 145 mmol/L 143   Potassium  3.5 - 5.1 mmol/L 4.0   Chloride  98 - 112 mmol/L 109   CO2  21.0 - 32.0 mmol/L 28.0   Anion Gap  0 - 18 mmol/L 6   BUN  7 - 18 mg/dL 14   Creatinine  0.55 - 1.02 mg/dL 0.67   BUN/CREA Ratio  10.0 - 20.0 20.9 High    Calcium, Total  8.5 - 10.1 mg/dL 9.5   Calculated Osmolality  275 - 295 mOsm/kg 297 High    eGFR-Cr  >=60 mL/min/1.73m2 113   Patient Fasting for BMP? No        PT  11.6 - 14.8 seconds 12.6   INR  0.85 - 1.16 0.95     URINE CULTURE No Growth 2 Days           IMAGING REVIEW:  Narrative   PROCEDURE:  CT ABDOMEN+PELVIS KIDNEYSTONE 2D RNDR(NO IV,NO ORAL)(CPT=74176)     COMPARISON:  [facility] , CT, CT ABDOMEN+PELVIS KIDNEYSTONE 2D RNDR(NO IV,NO ORAL)(CPT=74176), 4/14/2023, 9:06 AM.     INDICATIONS:  Z87.442 History of kidney stones R10.9 Left flank pain R31.0 Gross hematuria     TECHNIQUE:  Unenhanced multislice CT scanning from above the kidneys to below the urinary bladder. 2D rendering are generated on the CT scanner workstation to localize potential stones in the cranio-caudal plane. Dose reduction techniques were used. Dose information is transmitted to the [facility]) NRDR ([address]) which includes the Dose Index Registry. PATIENT STATED HISTORY: (As transcribed by Technologist)  Patient complains of intermittent episodes of flank pain right and left, Blood in her urine. and protien in lab work. FINDINGS:    LUNG BASES:  Minimal dependent atelectasis. LIVER:  Normal in shape and contour but limited evaluation without contrast.  BILIARY:   Gallbladder is unremarkable in appearance. No intrahepatic biliary dilatation. .  SPLEEN:  Normal.  No enlargement or focal lesion. PANCREAS:  No elena-pancreatic inflammatory stranding  ADRENALS:  Normal.  No mass or enlargement. KIDNEYS:  There is mild right-sided hydronephrosis. There is a 1.0 x 0.6 x 0.7 cm calculus within the right renal pelvis.   Additional nonobstructing bilateral renal calculi are present. BOWEL/MESENTERY:  Bowel is normal in caliber. No evidence of obstruction. Small fat containing umbilical hernia. PELVIS:  Mild bladder wall thickening. Trace amount of free pelvic fluid. Calcified phleboliths in the pelvis. AORTA/VASCULAR:  Aorta is normal in caliber. BONES:  No acute fractures                   Impression   CONCLUSION:    1. Mild right-sided hydronephrosis with a 1.0 x 0.6 x 0.7 cm calculus within the right renal pelvis. 2. Additional nonobstructing bilateral renal calculi. 3. Mild bladder wall thickening which may be secondary chronic outlet obstruction but correlation with urinalysis is recommended to exclude cystitis. 4. Trace amount of free pelvic fluid. [facility] LOCATION:  [id number]        Dictated by (CST): Smiley Muniz MD on 7/05/2023 at 9:37 AM      Finalized by (CST): Smiley Muniz MD on 7/05/2023 at 9:44 AM          OTHER RELEVANT DATA:   none     IMPRESSION: Large right proximal ureteral stone-will need definitive ureteroscopy. We discussed ureteroscopy including how it is performed and associated risks. I reviewed risks including bleeding, infection, damage to surrounding structures (urethra, bladder, ureter, kidney), inability to treat the stone and need for stent alone, need for additional/multiple procedures, need for prolonged stent, need for nephrostomy tube, stent colic/discomfort (extreme flank pain, bladder discomfort/spasms, urinary urgency and frequency, hematuria), ureteral stricture, ureteral avulsion requiring open surgery, sepsis, anesthesia complications (cardiorespiratory complications). I also counseled patient that NSAIDs and blood thinning agents need to be stopped appropriately prior to surgery. Discussed return precautions including fevers, chills, nausea, vomiting, intractable pain. Stent colic including flank pain, urgency, frequency and blood in the urine is common.  Patient agreed with the plan and understood the above. PLAN:  OR: Cystoscopy, right ureteroscopy, laser lithotripsy, stent placement-scheduled for July 13, 2023  CBC, Chem-7, INR, urine culture already completed  She knows to go to the emergency room if she has any fevers or chills    Thank you for referring this very pleasant patient to my clinic. If you have any questions or concerns, please do not hesitate to contact me. Sincerely,  Judy Subramanian MD    30 minutes were spent on this patient at this visit obtaining a history, reviewing medical records, developing a treatment plan, counseling and discussing treatment strategy with patient, coordination of care and documentation. The [facility] makes medical notes available to patients in the interest of transparency. However, please be advised that this is a medical document. It is intended as a peer to peer communication. It is written in medical language and may contain abbreviations or verbiage that are technical and unfamiliar. It may appear blunt or direct. Medical documents are intended to carry relevant information, facts as evident, and the clinical opinion of the practitioner.

## 2023-07-12 ENCOUNTER — ANESTHESIA EVENT (OUTPATIENT)
Dept: SURGERY | Facility: HOSPITAL | Age: 43
End: 2023-07-12
Payer: COMMERCIAL

## 2023-07-12 RX ORDER — CEFAZOLIN SODIUM/WATER 2 G/20 ML
2 SYRINGE (ML) INTRAVENOUS
Status: COMPLETED | OUTPATIENT
Start: 2023-07-13 | End: 2023-07-13

## 2023-07-12 NOTE — H&P
PRE-OP NOTE     NAME/MRN/PROCEDURE: Damián Cortes [id number] - cystoscopy, RIGHT ureteroscopy, laser lithotripsy, stent placement  HPI: 1cm proximal right ureteral stone, planned for definitive ureteroscopy  PMH: asthma, fatty liver, gestational diabetes, high cholesterol, anemia, migraines, kidney stones  PSH: Colonoscopy, wisdom teeth removal  MEDS: Albuterol, budesonide, montelukast, PEG, prenatal vitamin  ALL: No known allergies  LABS: Urine culture negative, INR 0.95, creatinine 0.67, hematocrit 439.1, platelets 101  IMAGINcm, 1cm, 567 HU    OTHER:   CONSTITUTIONAL: No apparent distress, cooperative and communicative  NEUROLOGIC: Alert and oriented   HEAD: Normocephalic, atraumatic   EYES: Sclera non-icteric   ENT: Hearing intact, moist mucous membranes   NECK: No obvious goiter or masses   RESPIRATORY: Normal respiratory effort, Nonlabored breathing on room air  SKIN: No evident rashes   ABDOMEN: Soft, nontender, nondistended, no rebound tenderness, no guarding, no masses  ABX: Ancef

## 2023-07-13 ENCOUNTER — ANESTHESIA (OUTPATIENT)
Dept: SURGERY | Facility: HOSPITAL | Age: 43
End: 2023-07-13
Payer: COMMERCIAL

## 2023-07-13 ENCOUNTER — HOSPITAL ENCOUNTER (OUTPATIENT)
Facility: HOSPITAL | Age: 43
Setting detail: HOSPITAL OUTPATIENT SURGERY
Discharge: HOME | End: 2023-07-13
Attending: UROLOGY | Admitting: UROLOGY
Payer: COMMERCIAL

## 2023-07-13 ENCOUNTER — APPOINTMENT (OUTPATIENT)
Dept: GENERAL RADIOLOGY | Facility: HOSPITAL | Age: 43
End: 2023-07-13
Attending: UROLOGY
Payer: COMMERCIAL

## 2023-07-13 ENCOUNTER — TELEPHONE (OUTPATIENT)
Dept: SURGERY | Facility: CLINIC | Age: 43
End: 2023-07-13

## 2023-07-13 VITALS
DIASTOLIC BLOOD PRESSURE: 54 MMHG | HEART RATE: 68 BPM | TEMPERATURE: 98 F | WEIGHT: 170.5 LBS | RESPIRATION RATE: 18 BRPM | HEIGHT: 61 IN | SYSTOLIC BLOOD PRESSURE: 119 MMHG | OXYGEN SATURATION: 96 % | BODY MASS INDEX: 32.19 KG/M2

## 2023-07-13 DIAGNOSIS — N20.0 NEPHROLITHIASIS: ICD-10-CM

## 2023-07-13 DIAGNOSIS — N20.0 NEPHROLITHIASIS: Primary | ICD-10-CM

## 2023-07-13 LAB
B-HCG UR QL: NEGATIVE
BILIRUB UR QL: NEGATIVE
GLUCOSE UR-MCNC: NORMAL MG/DL
KETONES UR-MCNC: NEGATIVE MG/DL
LEUKOCYTE ESTERASE UR QL STRIP.AUTO: 75
NITRITE UR QL STRIP.AUTO: NEGATIVE
PH UR: 5 [PH] (ref 5–8)
PROT UR-MCNC: 30 MG/DL
SP GR UR STRIP: 1.02 (ref 1–1.03)
UROBILINOGEN UR STRIP-ACNC: NORMAL

## 2023-07-13 PROCEDURE — 0TC38ZZ EXTIRPATION OF MATTER FROM RIGHT KIDNEY PELVIS, VIA NATURAL OR ARTIFICIAL OPENING ENDOSCOPIC: ICD-10-PCS | Performed by: UROLOGY

## 2023-07-13 PROCEDURE — 74420 UROGRAPHY RTRGR +-KUB: CPT | Performed by: UROLOGY

## 2023-07-13 PROCEDURE — 52356 CYSTO/URETERO W/LITHOTRIPSY: CPT | Performed by: UROLOGY

## 2023-07-13 PROCEDURE — 0T768DZ DILATION OF RIGHT URETER WITH INTRALUMINAL DEVICE, VIA NATURAL OR ARTIFICIAL OPENING ENDOSCOPIC: ICD-10-PCS | Performed by: UROLOGY

## 2023-07-13 DEVICE — URETERAL STENT
Type: IMPLANTABLE DEVICE | Status: FUNCTIONAL
Brand: ASCERTA™

## 2023-07-13 RX ORDER — IBUPROFEN 600 MG/1
600 TABLET ORAL ONCE AS NEEDED
Status: COMPLETED | OUTPATIENT
Start: 2023-07-13 | End: 2023-07-13

## 2023-07-13 RX ORDER — PROCHLORPERAZINE EDISYLATE 5 MG/ML
5 INJECTION INTRAMUSCULAR; INTRAVENOUS EVERY 8 HOURS PRN
Status: DISCONTINUED | OUTPATIENT
Start: 2023-07-13 | End: 2023-07-13

## 2023-07-13 RX ORDER — OXYBUTYNIN CHLORIDE 5 MG/1
5 TABLET ORAL ONCE AS NEEDED
Status: DISCONTINUED | OUTPATIENT
Start: 2023-07-13 | End: 2023-07-13

## 2023-07-13 RX ORDER — NALOXONE HYDROCHLORIDE 0.4 MG/ML
80 INJECTION, SOLUTION INTRAMUSCULAR; INTRAVENOUS; SUBCUTANEOUS AS NEEDED
Status: DISCONTINUED | OUTPATIENT
Start: 2023-07-13 | End: 2023-07-13

## 2023-07-13 RX ORDER — SODIUM CHLORIDE, SODIUM LACTATE, POTASSIUM CHLORIDE, CALCIUM CHLORIDE 600; 310; 30; 20 MG/100ML; MG/100ML; MG/100ML; MG/100ML
INJECTION, SOLUTION INTRAVENOUS CONTINUOUS
Status: DISCONTINUED | OUTPATIENT
Start: 2023-07-13 | End: 2023-07-13

## 2023-07-13 RX ORDER — DEXAMETHASONE SODIUM PHOSPHATE 4 MG/ML
VIAL (ML) INJECTION AS NEEDED
Status: DISCONTINUED | OUTPATIENT
Start: 2023-07-13 | End: 2023-07-13 | Stop reason: SURG

## 2023-07-13 RX ORDER — FAMOTIDINE 20 MG/1
20 TABLET, FILM COATED ORAL ONCE
Status: COMPLETED | OUTPATIENT
Start: 2023-07-13 | End: 2023-07-13

## 2023-07-13 RX ORDER — DEXTROSE MONOHYDRATE 25 G/50ML
50 INJECTION, SOLUTION INTRAVENOUS
Status: DISCONTINUED | OUTPATIENT
Start: 2023-07-13 | End: 2023-07-13

## 2023-07-13 RX ORDER — SULFAMETHOXAZOLE AND TRIMETHOPRIM 800; 160 MG/1; MG/1
1 TABLET ORAL 2 TIMES DAILY
Qty: 6 TABLET | Refills: 0 | Status: SHIPPED | OUTPATIENT
Start: 2023-07-13 | End: 2023-07-13

## 2023-07-13 RX ORDER — LIDOCAINE HYDROCHLORIDE 10 MG/ML
INJECTION, SOLUTION EPIDURAL; INFILTRATION; INTRACAUDAL; PERINEURAL AS NEEDED
Status: DISCONTINUED | OUTPATIENT
Start: 2023-07-13 | End: 2023-07-13 | Stop reason: SURG

## 2023-07-13 RX ORDER — CEPHALEXIN 500 MG/1
500 CAPSULE ORAL 4 TIMES DAILY
Qty: 12 CAPSULE | Refills: 0 | Status: SHIPPED | OUTPATIENT
Start: 2023-07-13 | End: 2023-07-16

## 2023-07-13 RX ORDER — NICOTINE POLACRILEX 4 MG
30 LOZENGE BUCCAL
Status: DISCONTINUED | OUTPATIENT
Start: 2023-07-13 | End: 2023-07-13

## 2023-07-13 RX ORDER — ONDANSETRON 2 MG/ML
INJECTION INTRAMUSCULAR; INTRAVENOUS AS NEEDED
Status: DISCONTINUED | OUTPATIENT
Start: 2023-07-13 | End: 2023-07-13 | Stop reason: SURG

## 2023-07-13 RX ORDER — HYDROMORPHONE HYDROCHLORIDE 1 MG/ML
0.6 INJECTION, SOLUTION INTRAMUSCULAR; INTRAVENOUS; SUBCUTANEOUS EVERY 5 MIN PRN
Status: DISCONTINUED | OUTPATIENT
Start: 2023-07-13 | End: 2023-07-13

## 2023-07-13 RX ORDER — HYDROMORPHONE HYDROCHLORIDE 1 MG/ML
0.4 INJECTION, SOLUTION INTRAMUSCULAR; INTRAVENOUS; SUBCUTANEOUS EVERY 5 MIN PRN
Status: DISCONTINUED | OUTPATIENT
Start: 2023-07-13 | End: 2023-07-13

## 2023-07-13 RX ORDER — MORPHINE SULFATE 4 MG/ML
4 INJECTION, SOLUTION INTRAMUSCULAR; INTRAVENOUS EVERY 10 MIN PRN
Status: DISCONTINUED | OUTPATIENT
Start: 2023-07-13 | End: 2023-07-13

## 2023-07-13 RX ORDER — MORPHINE SULFATE 4 MG/ML
2 INJECTION, SOLUTION INTRAMUSCULAR; INTRAVENOUS EVERY 10 MIN PRN
Status: DISCONTINUED | OUTPATIENT
Start: 2023-07-13 | End: 2023-07-13

## 2023-07-13 RX ORDER — MORPHINE SULFATE 10 MG/ML
6 INJECTION, SOLUTION INTRAMUSCULAR; INTRAVENOUS EVERY 10 MIN PRN
Status: DISCONTINUED | OUTPATIENT
Start: 2023-07-13 | End: 2023-07-13

## 2023-07-13 RX ORDER — ACETAMINOPHEN 500 MG
1000 TABLET ORAL ONCE
Status: COMPLETED | OUTPATIENT
Start: 2023-07-13 | End: 2023-07-13

## 2023-07-13 RX ORDER — TAMSULOSIN HYDROCHLORIDE 0.4 MG/1
0.4 CAPSULE ORAL
COMMUNITY

## 2023-07-13 RX ORDER — METOCLOPRAMIDE 10 MG/1
10 TABLET ORAL ONCE
Status: COMPLETED | OUTPATIENT
Start: 2023-07-13 | End: 2023-07-13

## 2023-07-13 RX ORDER — ONDANSETRON 2 MG/ML
4 INJECTION INTRAMUSCULAR; INTRAVENOUS EVERY 6 HOURS PRN
Status: DISCONTINUED | OUTPATIENT
Start: 2023-07-13 | End: 2023-07-13

## 2023-07-13 RX ORDER — MIDAZOLAM HYDROCHLORIDE 1 MG/ML
INJECTION INTRAMUSCULAR; INTRAVENOUS AS NEEDED
Status: DISCONTINUED | OUTPATIENT
Start: 2023-07-13 | End: 2023-07-13 | Stop reason: SURG

## 2023-07-13 RX ORDER — NICOTINE POLACRILEX 4 MG
15 LOZENGE BUCCAL
Status: DISCONTINUED | OUTPATIENT
Start: 2023-07-13 | End: 2023-07-13

## 2023-07-13 RX ORDER — GLYCOPYRROLATE 0.2 MG/ML
INJECTION, SOLUTION INTRAMUSCULAR; INTRAVENOUS AS NEEDED
Status: DISCONTINUED | OUTPATIENT
Start: 2023-07-13 | End: 2023-07-13 | Stop reason: SURG

## 2023-07-13 RX ORDER — OXYBUTYNIN CHLORIDE 5 MG/1
5 TABLET, EXTENDED RELEASE ORAL ONCE
Status: COMPLETED | OUTPATIENT
Start: 2023-07-13 | End: 2023-07-13

## 2023-07-13 RX ORDER — HYDROMORPHONE HYDROCHLORIDE 1 MG/ML
0.2 INJECTION, SOLUTION INTRAMUSCULAR; INTRAVENOUS; SUBCUTANEOUS EVERY 5 MIN PRN
Status: DISCONTINUED | OUTPATIENT
Start: 2023-07-13 | End: 2023-07-13

## 2023-07-13 RX ORDER — NEOSTIGMINE METHYLSULFATE 1 MG/ML
INJECTION, SOLUTION INTRAVENOUS AS NEEDED
Status: DISCONTINUED | OUTPATIENT
Start: 2023-07-13 | End: 2023-07-13 | Stop reason: SURG

## 2023-07-13 RX ORDER — ROCURONIUM BROMIDE 10 MG/ML
INJECTION, SOLUTION INTRAVENOUS AS NEEDED
Status: DISCONTINUED | OUTPATIENT
Start: 2023-07-13 | End: 2023-07-13 | Stop reason: SURG

## 2023-07-13 RX ORDER — ACETAMINOPHEN 500 MG
500 TABLET ORAL EVERY 6 HOURS PRN
COMMUNITY

## 2023-07-13 RX ADMIN — NEOSTIGMINE METHYLSULFATE 4 MG: 1 INJECTION, SOLUTION INTRAVENOUS at 13:09:00

## 2023-07-13 RX ADMIN — ONDANSETRON 4 MG: 2 INJECTION INTRAMUSCULAR; INTRAVENOUS at 13:06:00

## 2023-07-13 RX ADMIN — DEXAMETHASONE SODIUM PHOSPHATE 8 MG: 4 MG/ML VIAL (ML) INJECTION at 12:34:00

## 2023-07-13 RX ADMIN — ROCURONIUM BROMIDE 40 MG: 10 INJECTION, SOLUTION INTRAVENOUS at 12:27:00

## 2023-07-13 RX ADMIN — MIDAZOLAM HYDROCHLORIDE 2 MG: 1 INJECTION INTRAMUSCULAR; INTRAVENOUS at 12:22:00

## 2023-07-13 RX ADMIN — CEFAZOLIN SODIUM/WATER 2 G: 2 G/20 ML SYRINGE (ML) INTRAVENOUS at 12:33:00

## 2023-07-13 RX ADMIN — SODIUM CHLORIDE, SODIUM LACTATE, POTASSIUM CHLORIDE, CALCIUM CHLORIDE: 600; 310; 30; 20 INJECTION, SOLUTION INTRAVENOUS at 12:38:00

## 2023-07-13 RX ADMIN — SODIUM CHLORIDE, SODIUM LACTATE, POTASSIUM CHLORIDE, CALCIUM CHLORIDE: 600; 310; 30; 20 INJECTION, SOLUTION INTRAVENOUS at 12:20:00

## 2023-07-13 RX ADMIN — SODIUM CHLORIDE, SODIUM LACTATE, POTASSIUM CHLORIDE, CALCIUM CHLORIDE: 600; 310; 30; 20 INJECTION, SOLUTION INTRAVENOUS at 13:03:00

## 2023-07-13 RX ADMIN — GLYCOPYRROLATE 0.6 MG: 0.2 INJECTION, SOLUTION INTRAMUSCULAR; INTRAVENOUS at 13:09:00

## 2023-07-13 RX ADMIN — SODIUM CHLORIDE, SODIUM LACTATE, POTASSIUM CHLORIDE, CALCIUM CHLORIDE: 600; 310; 30; 20 INJECTION, SOLUTION INTRAVENOUS at 13:18:00

## 2023-07-13 RX ADMIN — LIDOCAINE HYDROCHLORIDE 50 MG: 10 INJECTION, SOLUTION EPIDURAL; INFILTRATION; INTRACAUDAL; PERINEURAL at 12:25:00

## 2023-07-13 NOTE — ANESTHESIA PROCEDURE NOTES
Airway  Date/Time: 7/13/2023 12:31 PM  Urgency: elective      General Information and Staff    Patient location during procedure: OR  Anesthesiologist: Paul Diaz MD  Performed: anesthesiologist   Performed by: Paul Diaz MD  Authorized by: Paul Diaz MD      Indications and Patient Condition  Indications for airway management: anesthesia  Spontaneous Ventilation: absent  Sedation level: deep  Preoxygenated: yes  Patient position: sniffing  Mask difficulty assessment: 1 - vent by mask    Final Airway Details  Final airway type: endotracheal airway      Successful airway: ETT  Cuffed: yes   Successful intubation technique: Video laryngoscopy ([place])  Facilitating devices/methods: intubating stylet  Endotracheal tube insertion site: oral  Blade: Audi  Blade size: #3  ETT size (mm): 7.0    Cormack-Lehane Classification: grade I - full view of glottis  Placement verified by: capnometry   Cuff volume (mL): 6  Measured from: lips  ETT to lips (cm): 21  Number of attempts at approach: 1  Number of other approaches attempted: 0

## 2023-07-13 NOTE — OPERATIVE REPORT
OPERATIVE REPORT  DATE OF SURGERY: 7/13/2023  PREOPERATIVE DIAGNOSIS: right kidney stone  POSTOPERATIVE DIAGNOSIS: same  PROCEDURE: Cystoscopy, right Ureteroscopy, laser lithotripsy, stone basketing,right retrograde pyelogram, placement of 6 x 26cm double j stent  SURGEON: So Jerome MD  ASSISTANT: none  ANESTHESIA: general  FLUIDS: per anesthesia  URINE OUTPUT: n/a  ESTIMATED BLOOD LOSS: 3c  SPECIMENS: right kidney stone  CONDITION: Stable to PACU    INDICATIONS:  1cm proximal right ureteral stone, planned for definitive ureteroscopy   PROCEDURE: The patient was met in the preoperative holding area. Appropriate informed consent was obtained and the patient was brought to the operative suite. ? Appropriate timeout was performed per hospital protocol. After the successful induction of general anesthesia, the patient was placed in the dorsal lithotomy position. ?Pressure points were meticulously padded. The patient was prepped and draped in a standard sterile fashion and IV ancef was given as preoperative prophylaxis. At this point, we passed a 22-Faroese cystoscope per urethra into the bladder. ? We identified the right ureteric orifice and used fluoroscopic guidance to cannulate this with a .038 Sensor guidewire to the level of the kidney. ?A dual lumen catheter was then passed into the proximal ureter and a second wire was passed through the dual lumen catheter and the dual lumen catheter was removed. We kept one wire as a safety wire and an over our working wire, we passed a 11/13-Faroese ureteral access sheath keeping one wire behind as a safety wire. We then passed a ureteroscope through the access sheath up into the kidney. The stone was seen  and was large in size. We brought in the 200 nanometer Track tip laser fiber on variable settings we ablated into smaller pieces.  The tipless nitinol basket was used to completely extract all of the significant fragments and then we confirmed this with ureteroscopy again. ?We brought the laser back in and dusted a few more tiny fragments with the laser, but then there was nothing significant remaining. ? At this point, the laser fiber was removed and the basket was removed. A retrograde pyelogram was performed which demonstrated delicate calyces. The access sheath was removed while scoping the entire ureter without evidence of any trauma or stone. ? Using the safety wire and fluoroscopic guidance, we passed a 6-Bengali x 26 cm stent with a nice coil in the right kidney and a nice coil in the bladder, the string was let. We secured the string to the mons with Mastisol, steri strips and Tegaderm. ?The procedure was then complete. Patient was awoken from anesthesia and taken to the recovery room in stable condition. All counts were correct x2.     IMPRESSION: s/p right urs/ll/stent all stones treated stent on string    PLAN:  Stent x 7 days  3 days abx, tamsulosin  6-8 weeks rbus and follow up

## 2023-07-13 NOTE — TELEPHONE ENCOUNTER
eva or ivy can you change kit braxton visit with me next week on 7/21 to a nurse visit for stent on string removal.  she needs a 6wk fu with me with an ultrasound prior    Pt still admitted will call later or tomorrow.

## 2023-07-14 ENCOUNTER — TELEPHONE (OUTPATIENT)
Dept: SURGERY | Facility: CLINIC | Age: 43
End: 2023-07-14

## 2023-07-14 DIAGNOSIS — R11.0 NAUSEA: Primary | ICD-10-CM

## 2023-07-14 RX ORDER — METOCLOPRAMIDE 10 MG/1
10 TABLET ORAL EVERY 6 HOURS PRN
Qty: 4 TABLET | Refills: 0 | Status: SHIPPED | OUTPATIENT
Start: 2023-07-14

## 2023-07-14 NOTE — TELEPHONE ENCOUNTER
S/W pt and informed her of AR's msg as stated below and she states that she saw the appt change to a n/v and she asked to arrange an appt for after her [address] on 9/1 and we made an post op appt for 9/6 at 10 am. Pt also asked about the 24 hr urine collection orders and I told her that I did not see one. I told her that I will send AR a msg asking if that order should be placed. We will get back to her with a response. Please advise.

## 2023-07-14 NOTE — TELEPHONE ENCOUNTER
- s/w pt; identity verified with name and   - pt continues to have post op nausea, denies vomiting.    - s/w AR who ordered Zofran 4mg, q8hrs, 4 doses, but pt states that the zofran didn't work for her, but the other medication she was given in the PACU did work. Reviewed pt medications and noted that she had also been given Reglan 10mg. 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 Again s/w AR and she agreed to order of Reglan 10mg, q6-8hrs, prn, total of 4 doses. Informed pt, [facility] [address] [place] [address] was [facility] preferred pharmacy. - Also instructed pt to ensure that she is adequately hydrating and pt agreed.   - encounter complete

## 2023-07-14 NOTE — TELEPHONE ENCOUNTER
Per pt still feeling nauseous after procedure, asking if more anti nausea medication can be prescribed. Please advise thank you.

## 2023-07-18 NOTE — TELEPHONE ENCOUNTER
LM FOR PT WITH THE INFO  STATED BELOW ON HER IDENTIFIED VM. MD Satish Ribeiro, RN  Caller: Unspecified (5 days ago,  1:20 PM)  Bhupinder haynes  Can you let her know Billie ordered the 24h urine studies.  She should get it 1-2 weeks after her stent is removed

## 2023-07-19 ENCOUNTER — TELEPHONE (OUTPATIENT)
Dept: SURGERY | Facility: CLINIC | Age: 43
End: 2023-07-19

## 2023-07-19 LAB
CAOX DIHYDRATE: 95 %
HYDROXYAPATITE: 5 %
WEIGHT-STONE: 33 MG

## 2023-07-20 NOTE — TELEPHONE ENCOUNTER
Spoke to patient advised her that she should have the 24hr urine test done and Dr Irene Oliver will determine if she will order a pth and calcium after the results of the 24 urine

## 2023-07-21 ENCOUNTER — NURSE ONLY (OUTPATIENT)
Dept: SURGERY | Facility: CLINIC | Age: 43
End: 2023-07-21

## 2023-07-21 VITALS — HEART RATE: 99 BPM | SYSTOLIC BLOOD PRESSURE: 103 MMHG | DIASTOLIC BLOOD PRESSURE: 74 MMHG

## 2023-07-21 DIAGNOSIS — N20.0 KIDNEY STONE: Primary | ICD-10-CM

## 2023-07-21 PROCEDURE — 3074F SYST BP LT 130 MM HG: CPT | Performed by: UROLOGY

## 2023-07-21 PROCEDURE — 51700 IRRIGATION OF BLADDER: CPT | Performed by: UROLOGY

## 2023-07-21 PROCEDURE — 3078F DIAST BP <80 MM HG: CPT | Performed by: UROLOGY

## 2023-07-21 NOTE — PROGRESS NOTES
Pt presented today for NV stent removal. I confirmed the patiens full name and . Pt placed herself on the bed, removed her underwear and pants. Dangle was secured to left thigh with tape. I carefully removed the tape and then removed the stent carefully. Stent was intact and patient tolerated the removal well. Visit complete.

## 2023-08-05 ENCOUNTER — LAB ENCOUNTER (OUTPATIENT)
Dept: LAB | Facility: HOSPITAL | Age: 43
End: 2023-08-05
Attending: UROLOGY
Payer: COMMERCIAL

## 2023-08-05 DIAGNOSIS — N20.0 NEPHROLITHIASIS: ICD-10-CM

## 2023-08-05 LAB
CALCIUM 24H UR-SRATE: 323 MG/24HR (ref 42–353)
CREAT UR-SCNC: 1.21 G/24 HR (ref 0.6–1.8)
PH 24H UR: 6 [PH] (ref 5–8)
SODIUM SERPL-SCNC: 175 MMOL/24HR (ref 40–220)
SPECIMEN VOL UR: 1700 ML
URATE UR-MCNC: 417 MG/24HR (ref 150–990)

## 2023-08-05 PROCEDURE — 82570 ASSAY OF URINE CREATININE: CPT

## 2023-08-05 PROCEDURE — 84300 ASSAY OF URINE SODIUM: CPT

## 2023-08-05 PROCEDURE — 82507 ASSAY OF CITRATE: CPT

## 2023-08-05 PROCEDURE — 82340 ASSAY OF CALCIUM IN URINE: CPT

## 2023-08-05 PROCEDURE — 84560 ASSAY OF URINE/URIC ACID: CPT

## 2023-08-05 PROCEDURE — 81003 URINALYSIS AUTO W/O SCOPE: CPT

## 2023-08-10 LAB
CITRATE/CREAT RATIO: 639.07 MG/G CREAT
CITRIC ACID 24H UR: 656 MG/24 HR
CITRIC ACID UR: 386 MG/L
CREAT 24H UR: 1027 MG/24 HR
CREAT UR: 60.4 MG/DL

## 2023-08-14 ENCOUNTER — OFFICE VISIT (OUTPATIENT)
Facility: CLINIC | Age: 43
End: 2023-08-14
Payer: COMMERCIAL

## 2023-08-14 ENCOUNTER — LAB ENCOUNTER (OUTPATIENT)
Dept: LAB | Facility: REFERENCE LAB | Age: 43
End: 2023-08-14
Attending: FAMILY MEDICINE
Payer: COMMERCIAL

## 2023-08-14 ENCOUNTER — PATIENT MESSAGE (OUTPATIENT)
Facility: CLINIC | Age: 43
End: 2023-08-14

## 2023-08-14 VITALS
DIASTOLIC BLOOD PRESSURE: 70 MMHG | OXYGEN SATURATION: 100 % | WEIGHT: 172 LBS | BODY MASS INDEX: 32.47 KG/M2 | SYSTOLIC BLOOD PRESSURE: 122 MMHG | HEIGHT: 61 IN | HEART RATE: 94 BPM

## 2023-08-14 DIAGNOSIS — Z00.00 PHYSICAL EXAM, ANNUAL: Primary | ICD-10-CM

## 2023-08-14 DIAGNOSIS — Z12.31 ENCOUNTER FOR SCREENING MAMMOGRAM FOR MALIGNANT NEOPLASM OF BREAST: ICD-10-CM

## 2023-08-14 DIAGNOSIS — Z00.00 PHYSICAL EXAM, ANNUAL: ICD-10-CM

## 2023-08-14 DIAGNOSIS — F51.04 CHRONIC INSOMNIA: ICD-10-CM

## 2023-08-14 DIAGNOSIS — J45.30 MILD PERSISTENT ASTHMA WITHOUT COMPLICATION: ICD-10-CM

## 2023-08-14 DIAGNOSIS — R51.9 LEFT-SIDED HEADACHE: ICD-10-CM

## 2023-08-14 DIAGNOSIS — N20.0 CALCIUM KIDNEY STONES: ICD-10-CM

## 2023-08-14 LAB
ALBUMIN SERPL-MCNC: 3.8 G/DL (ref 3.4–5)
ALBUMIN/GLOB SERPL: 1.1 {RATIO} (ref 1–2)
ALP LIVER SERPL-CCNC: 67 U/L
ALT SERPL-CCNC: 25 U/L
ANION GAP SERPL CALC-SCNC: 6 MMOL/L (ref 0–18)
AST SERPL-CCNC: 15 U/L (ref 15–37)
BASOPHILS # BLD AUTO: 0.04 X10(3) UL (ref 0–0.2)
BASOPHILS NFR BLD AUTO: 0.5 %
BILIRUB SERPL-MCNC: 0.2 MG/DL (ref 0.1–2)
BUN BLD-MCNC: 16 MG/DL (ref 7–18)
BUN/CREAT SERPL: 20.5 (ref 10–20)
CALCIUM BLD-MCNC: 9.3 MG/DL (ref 8.5–10.1)
CHLORIDE SERPL-SCNC: 106 MMOL/L (ref 98–112)
CHOLEST SERPL-MCNC: 254 MG/DL (ref ?–200)
CO2 SERPL-SCNC: 25 MMOL/L (ref 21–32)
CREAT BLD-MCNC: 0.78 MG/DL
DEPRECATED RDW RBC AUTO: 49.5 FL (ref 35.1–46.3)
EGFRCR SERPLBLD CKD-EPI 2021: 98 ML/MIN/1.73M2 (ref 60–?)
EOSINOPHIL # BLD AUTO: 0.09 X10(3) UL (ref 0–0.7)
EOSINOPHIL NFR BLD AUTO: 1.1 %
ERYTHROCYTE [DISTWIDTH] IN BLOOD BY AUTOMATED COUNT: 14.9 % (ref 11–15)
EST. AVERAGE GLUCOSE BLD GHB EST-MCNC: 111 MG/DL (ref 68–126)
FASTING PATIENT LIPID ANSWER: NO
FASTING STATUS PATIENT QL REPORTED: NO
GLOBULIN PLAS-MCNC: 3.5 G/DL (ref 2.8–4.4)
GLUCOSE BLD-MCNC: 86 MG/DL (ref 70–99)
HBA1C MFR BLD: 5.5 % (ref ?–5.7)
HCT VFR BLD AUTO: 42.5 %
HDLC SERPL-MCNC: 63 MG/DL (ref 40–59)
HGB BLD-MCNC: 13.3 G/DL
IMM GRANULOCYTES # BLD AUTO: 0.02 X10(3) UL (ref 0–1)
IMM GRANULOCYTES NFR BLD: 0.3 %
LDLC SERPL CALC-MCNC: 143 MG/DL (ref ?–100)
LYMPHOCYTES # BLD AUTO: 2.15 X10(3) UL (ref 1–4)
LYMPHOCYTES NFR BLD AUTO: 27.4 %
MCH RBC QN AUTO: 28.2 PG (ref 26–34)
MCHC RBC AUTO-ENTMCNC: 31.3 G/DL (ref 31–37)
MCV RBC AUTO: 90.2 FL
MONOCYTES # BLD AUTO: 0.53 X10(3) UL (ref 0.1–1)
MONOCYTES NFR BLD AUTO: 6.8 %
NEUTROPHILS # BLD AUTO: 5.01 X10 (3) UL (ref 1.5–7.7)
NEUTROPHILS # BLD AUTO: 5.01 X10(3) UL (ref 1.5–7.7)
NEUTROPHILS NFR BLD AUTO: 63.9 %
NONHDLC SERPL-MCNC: 191 MG/DL (ref ?–130)
OSMOLALITY SERPL CALC.SUM OF ELEC: 284 MOSM/KG (ref 275–295)
PLATELET # BLD AUTO: 251 10(3)UL (ref 150–450)
POTASSIUM SERPL-SCNC: 4 MMOL/L (ref 3.5–5.1)
PROT SERPL-MCNC: 7.3 G/DL (ref 6.4–8.2)
PTH-INTACT SERPL-MCNC: 58.3 PG/ML (ref 18.5–88)
RBC # BLD AUTO: 4.71 X10(6)UL
SODIUM SERPL-SCNC: 137 MMOL/L (ref 136–145)
TRIGL SERPL-MCNC: 265 MG/DL (ref 30–149)
TSI SER-ACNC: 1.46 MIU/ML (ref 0.36–3.74)
VIT B12 SERPL-MCNC: 891 PG/ML (ref 193–986)
VIT D+METAB SERPL-MCNC: 18.4 NG/ML (ref 30–100)
VLDLC SERPL CALC-MCNC: 50 MG/DL (ref 0–30)
WBC # BLD AUTO: 7.8 X10(3) UL (ref 4–11)

## 2023-08-14 PROCEDURE — 82306 VITAMIN D 25 HYDROXY: CPT | Performed by: FAMILY MEDICINE

## 2023-08-14 PROCEDURE — 83036 HEMOGLOBIN GLYCOSYLATED A1C: CPT | Performed by: FAMILY MEDICINE

## 2023-08-14 PROCEDURE — 80050 GENERAL HEALTH PANEL: CPT | Performed by: FAMILY MEDICINE

## 2023-08-14 PROCEDURE — 82607 VITAMIN B-12: CPT | Performed by: FAMILY MEDICINE

## 2023-08-14 PROCEDURE — 80061 LIPID PANEL: CPT | Performed by: FAMILY MEDICINE

## 2023-08-14 PROCEDURE — 83970 ASSAY OF PARATHORMONE: CPT | Performed by: FAMILY MEDICINE

## 2023-08-14 RX ORDER — HYDROXYZINE HYDROCHLORIDE 25 MG/1
25 TABLET, FILM COATED ORAL NIGHTLY PRN
Qty: 60 TABLET | Refills: 0 | Status: SHIPPED | OUTPATIENT
Start: 2023-08-14

## 2023-08-14 RX ORDER — BUDESONIDE 90 UG/1
1 AEROSOL, POWDER RESPIRATORY (INHALATION) 2 TIMES DAILY PRN
Qty: 2 EACH | Refills: 2 | Status: SHIPPED | OUTPATIENT
Start: 2023-08-14

## 2023-08-14 RX ORDER — GARLIC EXTRACT 500 MG
1 CAPSULE ORAL DAILY
COMMUNITY

## 2023-08-14 RX ORDER — MONTELUKAST SODIUM 10 MG/1
10 TABLET ORAL NIGHTLY
Qty: 90 TABLET | Refills: 1 | Status: SHIPPED | OUTPATIENT
Start: 2023-08-14

## 2023-08-14 RX ORDER — SUMATRIPTAN 25 MG/1
25 TABLET, FILM COATED ORAL EVERY 2 HOUR PRN
Qty: 30 TABLET | Refills: 0 | Status: SHIPPED | OUTPATIENT
Start: 2023-08-14

## 2023-08-15 NOTE — TELEPHONE ENCOUNTER
From: Cristina Morales  To: Steve Rome DO  Sent: 8/14/2023 6:47 PM CDT  Subject: Medications     Dr. Stephani Fontanez,  Can you please send the Pharmacy the medications that we talked about? The tablet for the migraines and the sleep medication that you said was similar to Benadryl.  Also I need a refill for my inhaler Pulmicort and my allergy medication Montelukast.

## 2023-09-01 ENCOUNTER — HOSPITAL ENCOUNTER (OUTPATIENT)
Dept: ULTRASOUND IMAGING | Facility: HOSPITAL | Age: 43
Discharge: HOME | End: 2023-09-01
Attending: UROLOGY
Payer: COMMERCIAL

## 2023-09-01 DIAGNOSIS — N20.0 NEPHROLITHIASIS: ICD-10-CM

## 2023-09-01 PROCEDURE — 76770 US EXAM ABDO BACK WALL COMP: CPT | Performed by: UROLOGY

## 2023-09-08 ENCOUNTER — OFFICE VISIT (OUTPATIENT)
Dept: DERMATOLOGY CLINIC | Facility: CLINIC | Age: 43
End: 2023-09-08

## 2023-09-08 DIAGNOSIS — Z12.83 SCREENING EXAM FOR SKIN CANCER: Primary | ICD-10-CM

## 2023-09-08 DIAGNOSIS — D22.9 MULTIPLE NEVI: ICD-10-CM

## 2023-09-08 NOTE — PROGRESS NOTES
HPI:    Patient ID: Kashif Hicks is a 41year old female. Patient presents for a full body skin exam. Spot of concern on right side of nostril for the past few months. No treatment used. No draining or tenderness noted. No allergies to medications noted. She does try to wear sun screen when she goes out. No personal or family hx of skin cancer noted. Review of Systems   Constitutional:  Negative for chills and fever. Musculoskeletal:  Negative for arthralgias and myalgias. Skin:  Positive for rash. Negative for color change and wound. Current Outpatient Medications   Medication Sig Dispense Refill    acidophilus-pectin Oral Cap Take 1 capsule by mouth daily. hydrOXYzine 25 MG Oral Tab Take 1 tablet (25 mg total) by mouth nightly as needed. 60 tablet 0    SUMAtriptan 25 MG Oral Tab Take 1 tablet (25 mg total) by mouth every 2 (two) hours as needed for Migraine (max 2 doses per day). 30 tablet 0    Budesonide (PULMICORT FLEXHALER) 90 MCG/ACT Inhalation Aerosol Powder, Breath Activated Inhale 1 puff into the lungs 2 (two) times daily as needed. 2 each 2    montelukast 10 MG Oral Tab Take 1 tablet (10 mg total) by mouth nightly. 90 tablet 1    albuterol 108 (90 Base) MCG/ACT Inhalation Aero Soln Inhale 2 puffs into the lungs every 6 (six) hours as needed for Wheezing or Shortness of Breath. 1 each 0    prenatal vitamin w/DHA 27-0.8-228 MG Oral Cap Take 1 capsule by mouth daily. Blood Glucose Monitoring Suppl (ONETOUCH VERIO FLEX SYSTEM) w/Device Does not apply Kit 1 kit by Does not apply route 2 (two) times daily. May substitute per insurance formulary 1 kit 0     Allergies:No Known Allergies   LMP 07/25/2023   There is no height or weight on file to calculate BMI. PHYSICAL EXAM:   Physical Exam  Constitutional:       General: She is not in acute distress. Appearance: Normal appearance. Skin:     General: Skin is warm and dry. Findings: Rash present. Comments: Multiple macular nevi noted throughotu the body. Even in color. Dermosocpy shows benign patterns. Brown and tan in color. Neurological:      Mental Status: She is alert and oriented to person, place, and time. ASSESSMENT/PLAN:   1. Screening exam for skin cancer  -After discussion with patient, advised the following:.  Reassurance regarding other benign skin lesions. Signs and symptoms of skin cancer, ABCDE's of melanoma discussed with patient. Sunscreen use, sun protection, self exams reviewed. Followup as noted RTC ---routine checkup 6 mos -one year or p.r.n.   -Pt was agreeable to plan and will comply with discussion above. 2. Multiple nevi  -After discussion with patient, advised the following:-  -Benign lesions  -Watch for now  -Return if there are changes.   -Pt was agreeable to plan and will comply with discussion above. No orders of the defined types were placed in this encounter.       Meds This Visit:  Requested Prescriptions      No prescriptions requested or ordered in this encounter       Imaging & Referrals:  None         FF#[id number]

## 2023-09-15 ENCOUNTER — OFFICE VISIT (OUTPATIENT)
Dept: SURGERY | Facility: CLINIC | Age: 43
End: 2023-09-15
Payer: COMMERCIAL

## 2023-09-15 VITALS
OXYGEN SATURATION: 95 % | HEIGHT: 61.3 IN | SYSTOLIC BLOOD PRESSURE: 124 MMHG | HEART RATE: 75 BPM | BODY MASS INDEX: 31.87 KG/M2 | DIASTOLIC BLOOD PRESSURE: 72 MMHG | WEIGHT: 171 LBS

## 2023-09-15 DIAGNOSIS — F43.9 STRESS: ICD-10-CM

## 2023-09-15 DIAGNOSIS — N20.0 RECURRENT KIDNEY STONES: ICD-10-CM

## 2023-09-15 DIAGNOSIS — E78.5 DYSLIPIDEMIA: Primary | ICD-10-CM

## 2023-09-15 DIAGNOSIS — Z86.32 HX GESTATIONAL DIABETES: ICD-10-CM

## 2023-09-15 DIAGNOSIS — K76.0 FATTY LIVER: ICD-10-CM

## 2023-09-15 DIAGNOSIS — E66.9 OBESITY (BMI 30-39.9): ICD-10-CM

## 2023-09-15 PROCEDURE — 3074F SYST BP LT 130 MM HG: CPT | Performed by: NURSE PRACTITIONER

## 2023-09-15 PROCEDURE — 3008F BODY MASS INDEX DOCD: CPT | Performed by: NURSE PRACTITIONER

## 2023-09-15 PROCEDURE — 99214 OFFICE O/P EST MOD 30 MIN: CPT | Performed by: NURSE PRACTITIONER

## 2023-09-15 PROCEDURE — 3078F DIAST BP <80 MM HG: CPT | Performed by: NURSE PRACTITIONER

## 2023-09-15 RX ORDER — BUPROPION HYDROCHLORIDE 150 MG/1
150 TABLET ORAL DAILY
Qty: 30 TABLET | Refills: 3 | Status: SHIPPED | OUTPATIENT
Start: 2023-09-15

## 2023-09-15 RX ORDER — PHENTERMINE HYDROCHLORIDE 37.5 MG/1
37.5 TABLET ORAL
Qty: 30 TABLET | Refills: 3 | Status: SHIPPED | OUTPATIENT
Start: 2023-09-15

## 2023-09-19 ENCOUNTER — TELEPHONE (OUTPATIENT)
Dept: SURGERY | Facility: CLINIC | Age: 43
End: 2023-09-19

## 2023-09-19 ENCOUNTER — PATIENT MESSAGE (OUTPATIENT)
Dept: SURGERY | Facility: CLINIC | Age: 43
End: 2023-09-19

## 2023-09-19 DIAGNOSIS — N20.0 NEPHROLITHIASIS: Primary | ICD-10-CM

## 2023-09-19 NOTE — TELEPHONE ENCOUNTER
PT sent below my chart message. Please advise, does patient osvaldo to repeat 24 hour urine collection. Mike November   to [facility]rology Clinical Staff (supporting Irish Dennis MD)   Angelica Fields      9/19/23  1:01 PM  Dr. Nat Bradford,  Thank you for getting back to me. I did have a follow-up in October as your are fully booked for September, however after reading your email I rescheduled it for December 11th as I wish to follow the diet instructions you left and redo my 24hr urinalysis in November. I met with my weight management doctor and I'm starting a pill to help curb my stress eating. She said it takes two weeks to be effective so I plan on doing the 24hr urinalysis November 27th.  Can you please put the order in?

## 2023-09-19 NOTE — TELEPHONE ENCOUNTER
Noted. Will send patient [facility] message.      Lorri Mckoy MD  You7 minutes ago (3:06 PM)     24h urine orders placed

## 2023-09-20 ENCOUNTER — TELEPHONE (OUTPATIENT)
Dept: SURGERY | Facility: CLINIC | Age: 43
End: 2023-09-20

## 2023-09-25 ENCOUNTER — TELEPHONE (OUTPATIENT)
Dept: SURGERY | Facility: CLINIC | Age: 43
End: 2023-09-25

## 2023-09-26 ENCOUNTER — OFFICE VISIT (OUTPATIENT)
Dept: OBGYN CLINIC | Facility: CLINIC | Age: 43
End: 2023-09-26

## 2023-09-26 VITALS
WEIGHT: 169 LBS | HEART RATE: 77 BPM | DIASTOLIC BLOOD PRESSURE: 72 MMHG | BODY MASS INDEX: 32 KG/M2 | SYSTOLIC BLOOD PRESSURE: 108 MMHG

## 2023-09-26 DIAGNOSIS — Z86.69 HX OF MIGRAINES: ICD-10-CM

## 2023-09-26 DIAGNOSIS — Z01.411 ENCOUNTER FOR GYNECOLOGICAL EXAMINATION WITH ABNORMAL FINDING: Primary | ICD-10-CM

## 2023-09-26 DIAGNOSIS — Z12.31 VISIT FOR SCREENING MAMMOGRAM: ICD-10-CM

## 2023-09-26 DIAGNOSIS — N92.0 MENORRHAGIA WITH REGULAR CYCLE: ICD-10-CM

## 2023-09-26 PROCEDURE — 99213 OFFICE O/P EST LOW 20 MIN: CPT | Performed by: OBSTETRICS & GYNECOLOGY

## 2023-09-26 PROCEDURE — 3078F DIAST BP <80 MM HG: CPT | Performed by: OBSTETRICS & GYNECOLOGY

## 2023-09-26 PROCEDURE — 3074F SYST BP LT 130 MM HG: CPT | Performed by: OBSTETRICS & GYNECOLOGY

## 2023-09-26 PROCEDURE — 99396 PREV VISIT EST AGE 40-64: CPT | Performed by: OBSTETRICS & GYNECOLOGY

## 2023-09-26 NOTE — PROGRESS NOTES
Mitra Maria is a 41year old female [id number] Patient's last menstrual period was 2023 (exact date). Patient presents with:  Gyn Exam: Annual // Heavy periods // Migraines   Menstrual Problem: Heavy periods since birth of son & stopped BF -- so in last  year. Periods heavy d#2-5 out of 5 w/ changing large pad every 3 hours. Partner w/ vasectomy but needs 2nd confirmation test in Oct. Also developed migraines w/o aura  .     OBSTETRICS HISTORY:     OB History    Para Term  AB Living   1 1 1     1   SAB IAB Ectopic Multiple Live Births         0 1      # Outcome Date GA Lbr Dav/2nd Weight Sex Delivery Anes PTL Lv   1 Term 22 39w0d 01:24 / 00:07 6 lb 10.9 oz (3.03 kg) M NORMAL SPONT None N FLAKO       GYNE HISTORY:     Periods regular monthly      Vasectomy    Sexual activity:   Yes      Partners:   Male        Hx Prior Abnormal Pap: No  Pap Date: 21  Pap Result Notes: Neg Pap/HPV // Mammo 12/10/22 Diag C1- Neg  Follow Up Recommendation: Last annual 22 [facility]          Latest Ref Rng & Units 2021     9:02 AM   RECENT PAP RESULTS   Thinprep Pap Negative for intraepithelial lesion or malignancy Negative for intraepithelial lesion or malignancy    HPV Negative Negative          MEDICAL HISTORY:     Past Medical History:   Diagnosis Date    Anxiety     Asthma     Calculus of kidney     Environmental allergies     Fatty liver 2023    Gestational diabetes     High cholesterol 2022    History of anemia     History of migraine     Kidney stone 2022    Migraines     Recurrent kidney stones 2023     Past Surgical History:   Procedure Laterality Date    COLONOSCOPY N/A 2023    Procedure: COLONOSCOPY;  Surgeon: Krystal Duque MD;  [facility]    CYSTOSCOPY,INSERT URETERAL STENT      WISDOM TEETH REMOVED      at age 14      OB History     T1    L1    SAB0  IAB0  Ectopic0  Multiple0  Live Births1      SOCIAL HISTORY:     Tobacco Use: Low Risk (2023)      Patient History          Smoking Tobacco Use: Never          Smokeless Tobacco Use: Never          Passive Exposure: Never    FAMILY HISTORY:     Family History   Problem Relation Age of Onset    Bipolar Disorder Mother     Diabetes Father     Glaucoma Father     Breast Cancer Maternal Grandmother 39    Other (bone cancer) Maternal Grandmother     No Known Problems Maternal Grandfather     Diabetes Paternal Grandmother     Heart Disease Paternal Grandfather     Blindness Paternal Grandfather     Heart Disease Maternal Aunt 36    Heart Disease Maternal Aunt 36    Breast Cancer Paternal Cousin 45        twin sisters (unsure of BRCA status)    Breast Cancer Paternal Cousin 45        twin sisters (unsure of BRCA status)    No Known Problems Brother          of meningitis    Breast Cancer Maternal Aunt     Dementia Maternal Aunt     Other (Other) Maternal Aunt         eye cancer     Heart Disease Maternal Aunt     Diabetes Paternal Aunt     Ovarian Cancer Neg     Colon Cancer Neg          MEDICATIONS:       Current Outpatient Medications:     buPROPion  MG Oral Tablet 24 Hr, Take 1 tablet (150 mg total) by mouth daily. , Disp: 30 tablet, Rfl: 3    Phentermine HCl 37.5 MG Oral Tab, Take 1 tablet (37.5 mg total) by mouth every morning before breakfast., Disp: 30 tablet, Rfl: 3    acidophilus-pectin Oral Cap, Take 1 capsule by mouth daily. , Disp: , Rfl:     hydrOXYzine 25 MG Oral Tab, Take 1 tablet (25 mg total) by mouth nightly as needed. , Disp: 60 tablet, Rfl: 0    SUMAtriptan 25 MG Oral Tab, Take 1 tablet (25 mg total) by mouth every 2 (two) hours as needed for Migraine (max 2 doses per day). , Disp: 30 tablet, Rfl: 0    Budesonide (PULMICORT FLEXHALER) 90 MCG/ACT Inhalation Aerosol Powder, Breath Activated, Inhale 1 puff into the lungs 2 (two) times daily as needed. , Disp: 2 each, Rfl: 2    montelukast 10 MG Oral Tab, Take 1 tablet (10 mg total) by mouth nightly., Disp: 90 tablet, Rfl: 1 albuterol 108 (90 Base) MCG/ACT Inhalation Aero Soln, Inhale 2 puffs into the lungs every 6 (six) hours as needed for Wheezing or Shortness of Breath., Disp: 1 each, Rfl: 0    prenatal vitamin w/DHA 27-0.8-228 MG Oral Cap, Take 1 capsule by mouth daily. , Disp: , Rfl:     Blood Glucose Monitoring Suppl (ONETOUCH VERIO FLEX SYSTEM) w/Device Does not apply Kit, 1 kit by Does not apply route 2 (two) times daily. May substitute per insurance formulary, Disp: 1 kit, Rfl: 0    ALLERGIES:     No Known Allergies      REVIEW OF SYSTEMS:     Constitutional:    denies fever / chills  Eyes:     denies blurred or double vision  Cardiovascular:  denies chest pain or palpitations  Respiratory:    denies shortness of breath  Gastrointestinal:  denies severe abdominal pain, frequent diarrhea or constipation, nausea / vomiting  Genitourinary:    denies dysuria, bothersome incontinence  Skin/Breast:   denies any breast pain, lumps, or discharge  Neurological:    denies frequent severe headaches  Psychiatric:   denies depression or anxiety, thoughts of harming self or others  Heme/Lymph:    denies easy bruising or bleeding      PHYSICAL EXAM:   Blood pressure 108/72, pulse 77, weight 169 lb (76.7 kg), last menstrual period 09/07/2023, not currently breastfeeding. Constitutional:  well developed, well nourished  Head/Face:  normocephalic  Neck/Thyroid: thyroid symmetric, no thyromegaly, no nodules, no adenopathy  Lymphatic: no abnormal supraclavicular or axillary adenopathy is noted  Breast:   normal without palpable masses, tenderness, asymmetry, nipple discharge, nipple retraction or skin changes  Abdomen:   soft, nontender, nondistended, no masses  Skin/Hair:  no unusual rashes or bruises  Extremities:  no edema, no cyanosis  Psychiatric:   oriented to time, place, person and situation.  Appropriate mood and affect    Pelvic Exam:  External Genitalia:  normal appearance, hair distribution, and no lesions  Urethral Meatus:   normal in size, location, without lesions and prolapse  Bladder:    no fullness, masses or tenderness  Vagina:    normal appearance without lesions, no abnormal discharge  Cervix:    normal without tenderness on motion  Uterus:    normal in size, contour, position, mobility, without tenderness  Adnexa:   normal without masses or tenderness  Perineum:   normal  Anus: no hemorroids     Recent Labs     05/08/23  0952 07/05/23  1631 08/14/23  1522   RBC 4.61 4.32 4.71   HGB 13.1 12.6 13.3   HCT 42.3 39.1 42.5   MCV 91.8 90.5 90.2   MCH 28.4 29.2 28.2   MCHC 31.0 32.2 31.3   RDW 14.3 14.7 14.9   NEPRELIM  --   --  5.01   WBC 6.4 6.4 7.8   .0 247.0 251.0       Recent Labs     10/14/21  1357 08/30/22  1129 08/14/23  1522   TSH 0.880 1.410 1.460         ASSESSMENT & PLAN:     Manisha was seen today for gyn exam and menstrual problem. Diagnoses and all orders for this visit:    Encounter for gynecological examination with abnormal finding    Menorrhagia with regular cycle  -     US PELVIS W EV (CPT=76856/90835); Future    Hx of migraines    Visit for screening mammogram  -     Mammogram Screen 3D Digital Bilateral; Future    Reviewed need for full eval w/ embx & ultrasound. Once done, then can discuss Rx options if wishes for periods to be lighter. Hx of migraines but no aura. SUMMARY:  Pap: Next cotest 7/24-26 per ASCCP guidelines. BCM:  Vasectomy  STD screening: declines  Mammogram: ordered placed   updated  Depression screen:   Depression Screening (PHQ-2/PHQ-9): Over the LAST 2 WEEKS   Little interest or pleasure in doing things (over the last two weeks)?: Not at all    Feeling down, depressed, or hopeless (over the last two weeks)?: Not at all    PHQ-2 SCORE: 0          FOLLOW-UP     Return for uterine biopsy. Note to patient and family:  The [address] [place] [address] makes medical notes available to patients in the interest of transparency. However, please be advised that this is a medical document.   It is intended as a peer to peer communication. It is written in medical language and may contain abbreviations or verbiage that are technical and unfamiliar. It may appear blunt or direct. Medical documents are intended to carry relevant information, facts as evident, and the clinical opinion of the practitioner.

## 2023-10-04 RX ORDER — SUMATRIPTAN 25 MG/1
25 TABLET, FILM COATED ORAL EVERY 2 HOUR PRN
Qty: 9 TABLET | Refills: 0 | Status: SHIPPED | OUTPATIENT
Start: 2023-10-04

## 2023-10-04 NOTE — TELEPHONE ENCOUNTER
Refill passed per [facility] [place] protocol    Requested Prescriptions   Pending Prescriptions Disp Refills    SUMAtriptan 25 MG Oral Tab 30 tablet 0     Sig: Take 1 tablet (25 mg total) by mouth every 2 (two) hours as needed for Migraine (max 2 doses per day).        Neurology Medications Passed - 10/3/2023  9:08 AM        Passed - In person appointment or virtual visit in the past 6 mos or appointment in next 3 mos     Recent Outpatient Visits              1 week ago Encounter for gynecological examination with abnormal finding    Wing Murphy, [address] [facility] Troy Laws MD    Office Visit    2 weeks ago Dyslipidemia    Wing Murphy, [address], [place], [place], [place]    Office Visit    3 weeks ago Screening exam for skin cancer    Michaela Ennis PA-C    Office Visit    1 month ago Physical exam, annual    Wesley Shultz, [address] Juan Hui DO    Office Visit    2 months ago Kidney stone [N20.0 (ICD-10-CM)]    Wing Murphy, [address], [place]    Nurse Only          Future Appointments         Provider Department Appt Notes    In 1 week [address] [facility]     In 3 weeks MD Wing Jeffrey Dong, [address] [facility] endometrial  ( on call )    In 2 months MD Wing Jeffrey Modi, [address] o/v post op    In 2 months [facility] [address] [facility]     In 2 months [place], Michaela [employer] PPO  NON SX F/U    In 11 months [facility] body check    In 11 months MD Wing Jeffrey Dong, [address], [address] [facility]

## 2023-10-14 ENCOUNTER — HOSPITAL ENCOUNTER (OUTPATIENT)
Dept: ULTRASOUND IMAGING | Facility: HOSPITAL | Age: 43
Discharge: HOME | End: 2023-10-14
Attending: OBSTETRICS & GYNECOLOGY
Payer: COMMERCIAL

## 2023-10-14 DIAGNOSIS — N92.0 MENORRHAGIA WITH REGULAR CYCLE: ICD-10-CM

## 2023-10-14 PROCEDURE — 76830 TRANSVAGINAL US NON-OB: CPT | Performed by: OBSTETRICS & GYNECOLOGY

## 2023-10-14 PROCEDURE — 76856 US EXAM PELVIC COMPLETE: CPT | Performed by: OBSTETRICS & GYNECOLOGY

## 2023-10-25 ENCOUNTER — TELEPHONE (OUTPATIENT)
Dept: OBGYN CLINIC | Facility: CLINIC | Age: 43
End: 2023-10-25

## 2023-10-25 NOTE — TELEPHONE ENCOUNTER
Pt accepts 11/6/23. Reviewed motrin prep 1 hr prior to appt. Advised EMBX cannot be done if bleeding.  with vasectomy for [facility].

## 2023-10-25 NOTE — TELEPHONE ENCOUNTER
It [facility] but cancelled. Message to [address] if pt was offered an appt on 11/6 [facility]? If so, what time do you want her to be added?

## 2023-11-06 ENCOUNTER — OFFICE VISIT (OUTPATIENT)
Dept: OBGYN CLINIC | Facility: CLINIC | Age: 43
End: 2023-11-06
Payer: COMMERCIAL

## 2023-11-06 VITALS
HEART RATE: 86 BPM | DIASTOLIC BLOOD PRESSURE: 81 MMHG | BODY MASS INDEX: 30 KG/M2 | SYSTOLIC BLOOD PRESSURE: 125 MMHG | WEIGHT: 161 LBS

## 2023-11-06 DIAGNOSIS — N92.0 EXCESSIVE OR FREQUENT MENSTRUATION: ICD-10-CM

## 2023-11-06 DIAGNOSIS — N92.0 MENORRHAGIA WITH REGULAR CYCLE: Primary | ICD-10-CM

## 2023-11-06 PROCEDURE — 58100 BIOPSY OF UTERUS LINING: CPT | Performed by: OBSTETRICS & GYNECOLOGY

## 2023-11-06 PROCEDURE — 3074F SYST BP LT 130 MM HG: CPT | Performed by: OBSTETRICS & GYNECOLOGY

## 2023-11-06 PROCEDURE — 3079F DIAST BP 80-89 MM HG: CPT | Performed by: OBSTETRICS & GYNECOLOGY

## 2023-11-06 NOTE — PROCEDURES
Endometrial Biopsy     Pre-Procedure Care:   Consent was obtained. Procedure/risks were explained. Questions were answered. Correct patient was identified. Correct side and site were confirmed. Birth control method(s) used:  vasectomy    Indication: heavy periods    Pre-Medications: The patient was premedicated with Naproxen Sodium. Description of Procedure:   A bivalve speculum was placed in the vagina and the cervix was prepped with betadine solution. Single tooth tenaculum placed at the 12 o'clock position. The uterine cavity was sounded at 8 cm. The endometrial cavity was curetted for pipelle tissue sampling with 2 passes. Specimen was sent to pathology. The single tooth tenaculum was removed. Silver nitrate was applied at the site of tenaculum application   Good hemostasis was noted. There were no complications. There was no blood loss. Discharge instructions were provided to the patient.     Visit Plan:  Return if wishes for Rx options after mammogram done

## 2023-11-09 ENCOUNTER — TELEPHONE (OUTPATIENT)
Dept: OBGYN CLINIC | Facility: CLINIC | Age: 43
End: 2023-11-09

## 2023-11-09 NOTE — TELEPHONE ENCOUNTER
See Results of 11/6/23 Endo biopsy, Pt will call office with Day 1 of cycle to sched. Endosee day 7-10 of cycle.

## 2023-12-02 ENCOUNTER — OFFICE VISIT (OUTPATIENT)
Dept: DERMATOLOGY CLINIC | Facility: CLINIC | Age: 43
End: 2023-12-02
Payer: COMMERCIAL

## 2023-12-02 DIAGNOSIS — L65.9 ALOPECIA: Primary | ICD-10-CM

## 2023-12-02 RX ORDER — CLOBETASOL PROPIONATE 0.46 MG/ML
1 SOLUTION TOPICAL
Qty: 60 ML | Refills: 3 | Status: SHIPPED | OUTPATIENT
Start: 2023-12-02

## 2023-12-02 NOTE — PROGRESS NOTES
HPI:    Patient ID: Elina Sethi is a 41year old female. Patient presents with concern of hair loss with dry scalp for the past 1.5 years. Experienced postpartum hair loss, but once hair started growing, alopecia spots formed that have been growing some hair with cycles of hair loss and scabbed plaques in the areas. Patient also has dry scalp with dandruff. Patient used head and shoulders with increased drying. Current treatment with aloe shampoo with some improvement and supplementation with prenatals and hair/skin/nails biotin vitamins. No draining or tenderness noted. No allergies to medications ntoed. No recent surgeries. No covid infections. Does have stress in her life. Used ketoconazole shampoo with no relief of dandruff. Review of Systems   Constitutional:  Negative for chills and fever. Musculoskeletal:  Negative for arthralgias and myalgias. Skin:  Positive for rash. Negative for color change and wound. Current Outpatient Medications   Medication Sig Dispense Refill    clobetasol 0.05 % External Solution Apply 1 mL topically daily as needed. 60 mL 3    SUMAtriptan 25 MG Oral Tab Take 1 tablet (25 mg total) by mouth every 2 (two) hours as needed for Migraine (max 2 doses per day). 9 tablet 0    buPROPion  MG Oral Tablet 24 Hr Take 1 tablet (150 mg total) by mouth daily. 30 tablet 3    Phentermine HCl 37.5 MG Oral Tab Take 1 tablet (37.5 mg total) by mouth every morning before breakfast. 30 tablet 3    acidophilus-pectin Oral Cap Take 1 capsule by mouth daily. hydrOXYzine 25 MG Oral Tab Take 1 tablet (25 mg total) by mouth nightly as needed. 60 tablet 0    Budesonide (PULMICORT FLEXHALER) 90 MCG/ACT Inhalation Aerosol Powder, Breath Activated Inhale 1 puff into the lungs 2 (two) times daily as needed. 2 each 2    montelukast 10 MG Oral Tab Take 1 tablet (10 mg total) by mouth nightly.  90 tablet 1    albuterol 108 (90 Base) MCG/ACT Inhalation Aero Soln Inhale 2 puffs into the lungs every 6 (six) hours as needed for Wheezing or Shortness of Breath. 1 each 0    prenatal vitamin w/DHA 27-0.8-228 MG Oral Cap Take 1 capsule by mouth daily. Blood Glucose Monitoring Suppl (ONETOUCH VERIO FLEX SYSTEM) w/Device Does not apply Kit 1 kit by Does not apply route 2 (two) times daily. May substitute per insurance formulary 1 kit 0     Allergies:No Known Allergies   LMP 10/27/2023 (Exact Date)   There is no height or weight on file to calculate BMI. PHYSICAL EXAM:   Physical Exam  Constitutional:       General: She is not in acute distress. Appearance: Normal appearance. Skin:     General: Skin is warm and dry. Findings: Rash present. Comments: Some thinning noted on the hairline. No draining or tenderness noted. Scaling noted throughout the scalp. Some erythema noted. Neurological:      Mental Status: She is alert and oriented to person, place, and time. ASSESSMENT/PLAN:   1. Alopecia  -After discussion with patient, advised the following:  -Checked recent labs  -Vitamin D is low  -Continue with vitamin D supplement.   -Check iron  -Will call with results.   -Start clobetasol  -Educated to apply daily at night   -Can use T-gel   -Educated to use 2-3 times per week. -Follow-up determined based on lab work. -To call or follow-up with worsening symptoms or concerns.   -Pt was agreeable to plan and will comply with discussion above. - Ferritin; Future      Orders Placed This Encounter   Procedures    Ferritin       Meds This Visit:  Requested Prescriptions     Signed Prescriptions Disp Refills    clobetasol 0.05 % External Solution 60 mL 3     Sig: Apply 1 mL topically daily as needed.        Imaging & Referrals:  None         #[id number]

## 2023-12-09 ENCOUNTER — LAB ENCOUNTER (OUTPATIENT)
Dept: LAB | Facility: HOSPITAL | Age: 43
End: 2023-12-09
Attending: UROLOGY
Payer: COMMERCIAL

## 2023-12-09 DIAGNOSIS — N20.0 NEPHROLITHIASIS: ICD-10-CM

## 2023-12-09 LAB
CALCIUM 24H UR-SRATE: 240 MG/24HR (ref 100–300)
CREAT UR-SCNC: 0.94 G/24 HR (ref 0.6–1.8)
PH 24H UR: 6.5 [PH] (ref 5–8)
SODIUM SERPL-SCNC: 120 MMOL/L/24HR (ref 40–220)
SPECIMEN VOL UR: 2000 ML
URATE UR-MCNC: 388 MG/24HR (ref 250–750)

## 2023-12-09 PROCEDURE — 84560 ASSAY OF URINE/URIC ACID: CPT

## 2023-12-09 PROCEDURE — 83945 ASSAY OF OXALATE: CPT

## 2023-12-09 PROCEDURE — 84300 ASSAY OF URINE SODIUM: CPT

## 2023-12-09 PROCEDURE — 81003 URINALYSIS AUTO W/O SCOPE: CPT

## 2023-12-09 PROCEDURE — 82507 ASSAY OF CITRATE: CPT

## 2023-12-09 PROCEDURE — 82340 ASSAY OF CALCIUM IN URINE: CPT

## 2023-12-09 PROCEDURE — 82570 ASSAY OF URINE CREATININE: CPT

## 2023-12-11 ENCOUNTER — OFFICE VISIT (OUTPATIENT)
Dept: SURGERY | Facility: CLINIC | Age: 43
End: 2023-12-11
Payer: COMMERCIAL

## 2023-12-11 ENCOUNTER — LAB ENCOUNTER (OUTPATIENT)
Dept: LAB | Facility: HOSPITAL | Age: 43
End: 2023-12-11
Attending: PHYSICIAN ASSISTANT
Payer: COMMERCIAL

## 2023-12-11 DIAGNOSIS — N20.0 NEPHROLITHIASIS: Primary | ICD-10-CM

## 2023-12-11 DIAGNOSIS — L65.9 ALOPECIA: ICD-10-CM

## 2023-12-11 LAB — DEPRECATED HBV CORE AB SER IA-ACNC: 7.8 NG/ML

## 2023-12-11 PROCEDURE — 82728 ASSAY OF FERRITIN: CPT

## 2023-12-11 PROCEDURE — 99213 OFFICE O/P EST LOW 20 MIN: CPT | Performed by: UROLOGY

## 2023-12-11 PROCEDURE — 36415 COLL VENOUS BLD VENIPUNCTURE: CPT

## 2023-12-11 NOTE — PROGRESS NOTES
Emanuel Allan MD  Department of Urology  [facility] [place]    T: [phone]  F: [phone]    To: Armen Holden [facility] [phone]    Re: Kayce Bañuelos   MRN: [id number]  : 3/3/1980    Dear Brendon Matos DO,    Today I had the pleasure of seeing Kayce Bañuelos in my clinic. As you know, Ms. Twylla Rinne is a pleasant 41year old year old female who I am seeing for follow up nephrolithiasis. Patient was last seen in this department on 7/10/2023. Briefly, patient has followed with me for nephrolithiasis since 2022. In May 2023 she underwent left ureteroscopy laser lithotripsy. Please note the stone was severely impacted. Stone analysis did return calcium oxalate dihydrate 100%. She had a known stone on the right side which was being monitored. She developed a stone on the right side that required ureteroscopy on 2023. All of the stones were treated and her stent was placed on the string. Her stent was removed by nursing. She had a renal bladder ultrasound that demonstrated no abnormalities 16 weeks later. She also underwent 24-hour urine testing but the collection was is slight undercollection and therefore she repeated her 24-hour urine testing. Some of the values are still pending. 24 Hour Metabolic Urine Study Results    Creatinine:-Pending  Normal 18-20 mg/kilogram/day (female)  Normal 20-22 mg/kilogram/day (male)  Cystinuria present?  No  Volume: 2000 mL/day-slightly low  Goal >2500  Calcium: 240 mg/day  Goal <250  Oxalate:-Pending  Goal <40  Citrate:-Pending  Goal >450  Sodium: 120 mmol/day   Goal <150  pH: 6.5  Goal 5.8 - 6.2 (>6 for UA stones, >7 for cystine stone)  Uric acid: 388 mg/day  Goal <750    Last stone analysis: Calcium oxalate dihydrate, hydroxyapatite  Serum calcium: 9.3  PTH: 58.3  Serum uric acid: No value               PAST MEDICAL HISTORY:  Past Medical History:   Diagnosis Date    Anxiety     Asthma     Calculus of kidney     Environmental allergies     Fatty liver 04/03/2023    Gestational diabetes     High cholesterol 11/17/2022    History of anemia     History of migraine     Kidney stone 11/25/2022    Migraines     Recurrent kidney stones 04/03/2023        PAST SURGICAL HISTORY:  Past Surgical History:   Procedure Laterality Date    COLONOSCOPY N/A 03/08/2023    Procedure: COLONOSCOPY;  Surgeon: Jewels Berman MD;  Location: [address] [place] [address]      at age 14          ALLERGIES:  No Known Allergies      MEDICATIONS:  Current Outpatient Medications   Medication Instructions    acidophilus-pectin Oral Cap 1 capsule, Oral, Daily    albuterol 108 (90 Base) MCG/ACT Inhalation Aero Soln 2 puffs, Inhalation, Every 6 hours PRN    Blood Glucose Monitoring Suppl (ONETOUCH VERIO FLEX SYSTEM) w/Device Does not apply Kit 1 kit, Does not apply, 2 times daily, May substitute per insurance formulary    Budesonide (PULMICORT FLEXHALER) 90 MCG/ACT Inhalation Aerosol Powder, Breath Activated 1 puff, Inhalation, 2 times daily PRN    buPROPion ER (WELLBUTRIN XL) 150 mg, Oral, Daily    clobetasol 0.05 % External Solution 1 mL, Topical, Daily as needed    hydrOXYzine (ATARAX) 25 mg, Oral, Nightly PRN    montelukast (SINGULAIR) 10 mg, Oral, Nightly    Phentermine HCl (ADIPEX-P) 37.5 mg, Oral, Every morning before breakfast    prenatal vitamin w/DHA 27-0.8-228 MG Oral Cap 1 capsule, Oral, Daily    SUMAtriptan (IMITREX) 25 mg, Oral, Every 2 hour PRN        FAMILY HISTORY:  Family History   Problem Relation Age of Onset    Bipolar Disorder Mother     Diabetes Father     Glaucoma Father     Breast Cancer Maternal Grandmother 39    Other (bone cancer) Maternal Grandmother     No Known Problems Maternal Grandfather     Diabetes Paternal Grandmother     Heart Disease Paternal Grandfather     Blindness Paternal Grandfather     Heart Disease Maternal Aunt 44 Heart Disease Maternal Aunt 36    Breast Cancer Paternal Cousin 45        twin sisters (unsure of BRCA status)    Breast Cancer Paternal Cousin 45        twin sisters (unsure of BRCA status)    No Known Problems Brother          of meningitis    Breast Cancer Maternal Aunt     Dementia Maternal Aunt     Other (Other) Maternal Aunt         eye cancer     Heart Disease Maternal Aunt     Diabetes Paternal Aunt     Ovarian Cancer Neg     Colon Cancer Neg         SOCIAL HISTORY:  Social History     Socioeconomic History    Marital status:    Occupational History    Occupation: [employer]   Tobacco Use    Smoking status: Never     Passive exposure: Never    Smokeless tobacco: Never   Vaping Use    Vaping Use: Never used   Substance and Sexual Activity    Alcohol use: Not Currently    Drug use: Never    Sexual activity: Yes     Partners: Male   Other Topics Concern    Blood Transfusions No    Breast feeding No    Reaction to local anesthetic No    Pt has a pacemaker No    Pt has a defibrillator No   Social History Narrative    Live with  Candace Sylvester)    No h/o abuse          PHYSICAL EXAMINATION:  There were no vitals filed for this visit. CONSTITUTIONAL: No apparent distress, cooperative and communicative  NEUROLOGIC: Alert and oriented   HEAD: Normocephalic, atraumatic   EYES: Sclera non-icteric   ENT: Hearing intact, moist mucous membranes   NECK: No obvious goiter or masses   RESPIRATORY: Normal respiratory effort, Nonlabored breathing on room air  SKIN: No evident rashes   ABDOMEN: Soft, nontender, nondistended, no rebound tenderness, no guarding, no masses      REVIEW OF SYSTEMS:    A comprehensive 10-point review of systems was completed. Pertinent positives and negatives are noted in the the HPI. LABORATORY DATA:  24 Hour Metabolic Urine Study Results    Creatinine:-Pending  Normal 18-20 mg/kilogram/day (female)  Normal 20-22 mg/kilogram/day (male)  Cystinuria present?  No  Volume: 2000 mL/day-slightly low  Goal >2500  Calcium: 240 mg/day  Goal <250  Oxalate:-Pending  Goal <40  Citrate:-Pending  Goal >450  Sodium: 120 mmol/day   Goal <150  pH: 6.5  Goal 5.8 - 6.2 (>6 for UA stones, >7 for cystine stone)  Uric acid: 388 mg/day  Goal <750    Last stone analysis: Calcium oxalate dihydrate, hydroxyapatite  Serum calcium: 9.3  PTH: 58.3  Serum uric acid: No value        IMAGING REVIEW:  Narrative   PROCEDURE: US KIDNEY/BLADDER (CPT=76770)     COMPARISON: None. INDICATIONS: N20.0 Nephrolithiasis     TECHNIQUE: Ultrasound examination was performed to visualize the kidneys and bladder. FINDINGS:  RIGHT KIDNEY: Right kidney length is 10.7 cm. Normal echotexture. No hydronephrosis, mass, calculus or perirenal fluid. LEFT KIDNEY: Left kidney length is 11.4 cm. Normal echotexture. No hydronephrosis, mass, calculus or perirenal fluid. BLADDER: Bladder is distended to a volume of 319 mL without significant postvoid residual.  No visible wall thickening, mass, or calculus. CONCLUSION:     Unremarkable renal ultrasound. DICTATED BY (CST): Sarabjit Zamarripa MD ON 9/01/2023 AT 12:48 PM      FINALIZED BY (CST): Sarabjit Zamarripa MD ON 9/01/2023 AT 12:50 PM                    OTHER RELEVANT DATA:   none     IMPRESSION: Bilateral nephrolithiasis status post ureteroscopy bilaterally last in July 2023 with 24-hour urine testing demonstrating slightly low urine volume. Creatinine, citrate and oxalate pending. Overall recommend standard stone prevention at this time as listed below    Patient was counseled on stone prevention methods including hydration (until urine is clear), limiting salt and red meat/meat intake, eating a normal calcium diet, and drinking lemon with water. We also talked about reasons to go to the emergency room - namely acute flank pain associated with fevers, chills, nausea or vomiting.        PLAN:  Return to clinic in 1 year  Renal bladder ultrasound prior to visit  Stone prevention mechanisms  Follow-up remaining 24-hour urine testing - will message her (if any abnormalities, will plan for earlier follow up)    Thank you for referring this very pleasant patient to my clinic. If you have any questions or concerns, please do not hesitate to contact me. Sincerely,  Irish Dennis MD    20 minutes were spent on this patient at this visit obtaining a history, reviewing medical records, developing a treatment plan, counseling and discussing treatment strategy with patient, coordination of care and documentation. The [facility] makes medical notes available to patients in the interest of transparency. However, please be advised that this is a medical document. It is intended as a peer to peer communication. It is written in medical language and may contain abbreviations or verbiage that are technical and unfamiliar. It may appear blunt or direct. Medical documents are intended to carry relevant information, facts as evident, and the clinical opinion of the practitioner.

## 2023-12-12 ENCOUNTER — TELEPHONE (OUTPATIENT)
Dept: OBGYN CLINIC | Facility: CLINIC | Age: 43
End: 2023-12-12

## 2023-12-14 LAB
CITRATE/CREAT RATIO: 777.27 MG/G CREAT
CITRIC ACID 24H UR: 684 MG/24 HR
CITRIC ACID UR: 342 MG/L
CREAT 24H UR: 880 MG/24 HR
CREAT UR: 43.6 MG/DL
CREAT UR: 44 MG/DL
OXALATE/CREAT. RATIO: 20.6 MG/G CREAT
OXALATES 24H UR: 18 MG/24 HR
OXALATES UR: 9 MG/L

## 2023-12-18 ENCOUNTER — OFFICE VISIT (OUTPATIENT)
Dept: OBGYN CLINIC | Facility: CLINIC | Age: 43
End: 2023-12-18
Payer: COMMERCIAL

## 2023-12-18 ENCOUNTER — TELEPHONE (OUTPATIENT)
Dept: OBGYN CLINIC | Facility: CLINIC | Age: 43
End: 2023-12-18

## 2023-12-18 VITALS
HEART RATE: 94 BPM | BODY MASS INDEX: 29 KG/M2 | WEIGHT: 155.81 LBS | DIASTOLIC BLOOD PRESSURE: 80 MMHG | SYSTOLIC BLOOD PRESSURE: 123 MMHG

## 2023-12-18 DIAGNOSIS — N84.0 POLYP OF CORPUS UTERI: Primary | ICD-10-CM

## 2023-12-18 PROCEDURE — 3074F SYST BP LT 130 MM HG: CPT | Performed by: OBSTETRICS & GYNECOLOGY

## 2023-12-18 PROCEDURE — 3079F DIAST BP 80-89 MM HG: CPT | Performed by: OBSTETRICS & GYNECOLOGY

## 2023-12-18 PROCEDURE — 58555 HYSTEROSCOPY DX SEP PROC: CPT | Performed by: OBSTETRICS & GYNECOLOGY

## 2023-12-18 NOTE — TELEPHONE ENCOUNTER
Please schedule the following surgery:    Procedure: hysteroscopic polypectomy / currettage    Date: D#7-10 of cycle    Diagnosis: endometrial polyp / heavy periods    Admission:Day surgery    Anesth: general    Preop Medical Clearance needed:  No    PCN allergy:  no antibiotic needed    Additional Orders: routine orders    Additional equipment:  True Hernandez    Rep needed: Yes    Additional surgery time needed: none    IPA tubal / hyst form signed: not applicable    Comments / Orders to Nurse: none

## 2023-12-18 NOTE — PROCEDURES
Endosee Procedure     LMP: 12/10/23  Birth control method(s) used: Vasectomy    Consent signed. Procedure discussed with the patient in detail including indication, risks, benefits, alternatives and complications. Indication:  endometrial polyp    Procedure:   diagnostic hysteroscopy    Speculum placed in the vagina. Betadine wash of vagina and cervix performed. Single tooth tenaculum was placed at the 12 o'clock position. Endocervical dilator placed within cervical canal until slight resistance bypassed and left in place. Dilator exchanged for Endosee catheter. Camera attached. Assistant injected 1 cc / sec slowly allowing visualization of endometrial cavity. Fallopian tubes os seen bilaterally. Findings: (+) polyp & excess tissue  The fluid was withdrawn from cavity while camera was removed. Single tooth tenaculum removed. Silver nitrate used to achieve hemostasis. Good hemostasis noted. Patient tolerated procedure well. Visit Plan:  Plan for hysteroscopic polypectomy / fidel D#7-10 of next cycle.

## 2023-12-19 ENCOUNTER — TELEPHONE (OUTPATIENT)
Dept: SURGERY | Facility: CLINIC | Age: 43
End: 2023-12-19

## 2023-12-19 NOTE — TELEPHONE ENCOUNTER
Juan Stewart, Patient was last seen in clinic on 9/15/23, She had a follow up appointment scheduled for 12/28/23 which she was not able to keep and rescheduled to next available on 3/29/24. Patient requested a refill for her Bupropion and Phentermine medications please.  Routed to [facility]

## 2023-12-20 DIAGNOSIS — F43.9 STRESS: ICD-10-CM

## 2023-12-20 DIAGNOSIS — E66.9 OBESITY (BMI 30-39.9): ICD-10-CM

## 2023-12-20 RX ORDER — BUPROPION HYDROCHLORIDE 150 MG/1
150 TABLET ORAL DAILY
Qty: 90 TABLET | Refills: 0 | Status: SHIPPED | OUTPATIENT
Start: 2023-12-20

## 2023-12-20 RX ORDER — PHENTERMINE HYDROCHLORIDE 37.5 MG/1
37.5 TABLET ORAL
Qty: 30 TABLET | Refills: 2 | Status: SHIPPED | OUTPATIENT
Start: 2023-12-20

## 2023-12-26 ENCOUNTER — HOSPITAL ENCOUNTER (OUTPATIENT)
Dept: MAMMOGRAPHY | Facility: HOSPITAL | Age: 43
Discharge: HOME | End: 2023-12-26
Attending: OBSTETRICS & GYNECOLOGY
Payer: COMMERCIAL

## 2023-12-26 DIAGNOSIS — Z12.31 VISIT FOR SCREENING MAMMOGRAM: ICD-10-CM

## 2023-12-26 PROCEDURE — 77067 SCR MAMMO BI INCL CAD: CPT | Performed by: OBSTETRICS & GYNECOLOGY

## 2023-12-26 PROCEDURE — 77063 BREAST TOMOSYNTHESIS BI: CPT | Performed by: OBSTETRICS & GYNECOLOGY

## 2024-01-02 ENCOUNTER — PATIENT MESSAGE (OUTPATIENT)
Facility: CLINIC | Age: 44
End: 2024-01-02

## 2024-01-02 ENCOUNTER — PATIENT MESSAGE (OUTPATIENT)
Dept: OBGYN CLINIC | Facility: CLINIC | Age: 44
End: 2024-01-02

## 2024-01-02 DIAGNOSIS — R92.30 DENSE BREAST TISSUE ON MAMMOGRAM, UNSPECIFIED TYPE: ICD-10-CM

## 2024-01-02 DIAGNOSIS — Z80.3 FAMILY HISTORY OF BREAST CANCER: Primary | ICD-10-CM

## 2024-01-02 DIAGNOSIS — Z91.89 AT HIGH RISK FOR BREAST CANCER: ICD-10-CM

## 2024-01-03 ENCOUNTER — TELEPHONE (OUTPATIENT)
Dept: OBGYN CLINIC | Facility: CLINIC | Age: 44
End: 2024-01-03

## 2024-01-03 NOTE — TELEPHONE ENCOUNTER
Called back Francisca in Radiology. Francisca states pt called them yesterday to schedule MRI of breast asap. Informed Francisca that pt had a recent Screening mammo on 12/26/23 with normal result and [facility] recs for next mammo in one year. Informed Francisca if there is no indication for MRI then md will not order. Francisca will call pt.

## 2024-01-03 NOTE — TELEPHONE ENCOUNTER
Patient has not seen Dr. Boggs since November 2022.    From: Annabella Live  To: Rita Boggs  Sent: 1/2/2024 10:05 PM CST  Subject: MRI Referral OF The Breast     Dr. Boggs,  Can you please send the MRI Scheduling Team a referral for an MRI of the breast? They are requesting that it states that I am HIGH RISK as I have a family history of breast cancer (Maternal Grandmother, Maternal Aunt and Paternal Twin Cousins. I also have dense breast tissue and my mammogram report from last year as well as this year stated that an MRI of my breast would be beneficial as an extra precaution.   I did an MRI last year (per your referral) without any issues. For my peace of mind I would like to continue following up each mammogram with an MRI of my breast. Thank You.

## 2024-01-03 NOTE — TELEPHONE ENCOUNTER
From: Annabella Live  To: Abril Plascencia  Sent: 1/2/2024 10:03 PM CST  Subject: MRI Referral For Breast    Hi Dr. Plascencia,  Can you please send the MRI Scheduling Team a referral for an MRI of the breast? They are requesting that it states that I am HIGH RISK as I have a family history of breast cancer (Maternal Grandmother, Maternal Aunt and Paternal Twin Cousins. I also have dense breast tissue and my mammogram report from last year as well as this year stated that an MRI of my breast would be beneficial as an extra precaution.   I did an MRI last year without any issues. For my peace of mind I would like to continue following up each mammogram with an MRI of my breast.   Thank You.

## 2024-01-05 NOTE — TELEPHONE ENCOUNTER
[facility] Radiology called, verified patient's Name and . She is following up on MRI breast order. Relayed that imaging was ordered by Dr. Santamaria yesterday. Verbalized understanding and had no further questions at this time.

## 2024-01-08 NOTE — TELEPHONE ENCOUNTER
It appears breast MRI was already placed by another MD, Dr Santamaria. Pt informed via my chart.

## 2024-01-08 NOTE — DISCHARGE INSTRUCTIONS
HOME INSTRUCTIONS    No sex, tampons or douching until cleared by MD  Diet as tolerated  Ibuprofen every 6 hours as needed for pain/cramping (may alternate with Tylenol if needed)  Call if bleeding through pad every hour, uncontrolled pain, uncontrolled nausea/vomiting, fever, or foul smelling vaginal discharge  Follow up with Dr Plascencia in 2 weeks    [facility] HOME CARE INSTRUCTIONS: POST-OP ANESTHESIA  The medication that you received for sedation or general anesthesia can last up to 24 hours. Your judgment and reflexes may be altered, even if you feel like your normal self.      We Recommend:   Do not drive any motor vehicle or bicycle   Avoid mowing the lawn, playing sports, or working with power tools/applicances (power saws, electric knives or mixers)   That you have someone stay with you on your first night home   Do not drink alcohol or take sleeping pills or tranquilizers   Do not sign legal documents within 24 hours of your procedure   If you had a nerve block for your surgery, take extra care not to put any pressure on your arm or hand for 24 hours    It is normal:  For you to have a sore throat if you had a breathing tube during surgery (while you were asleep!). The sore throat should get better within 48 hours. You can gargle with warm salt water (1/2 tsp in 4 oz warm water) or use a throat lozenge for comfort  To feel muscle aches or soreness especially in the abdomen, chest or neck. The achy feeling should go away in the next 24 hours  To feel weak, sleepy or \"wiped out\". Your should start feeling better in the next 24 hours.   To experience mild discomforts such as sore lip or tongue, headache, cramps, gas pains or a bloated feeling in your abdomen.   To experience mild back pain or soreness for a day or two if you had spinal or epidural anesthesia.   If you had laparoscopic surgery, to feel shoulder pain or discomfort on the day of surgery.   For some patients to have nausea after  surgery/anesthesia    If you feel nausea or experience vomiting:   Try to move around less.   Eat less than usual or drink only liquids until the next morning   Nausea should resolve in about 24 hours    If you have a problem when you are at home:    Call your surgeons office   Discharge Instructions: After Your Surgery  You’ve just had surgery. During surgery, you were given medicine called anesthesia to keep you relaxed and free of pain. After surgery, you may have some pain or nausea. This is common. Here are some tips for feeling better and getting well after surgery.   Going home  Your healthcare provider will show you how to take care of yourself when you go home. They'll also answer your questions. Have an adult family member or friend drive you home. For the first 24 hours after your surgery:   Don't drive or use heavy equipment.  Don't make important decisions or sign legal papers.  Take medicines as directed.  Don't drink alcohol.  Have someone stay with you, if needed. They can watch for problems and help keep you safe.  Be sure to go to all follow-up visits with your healthcare provider. And rest after your surgery for as long as your provider tells you to.   Coping with pain  If you have pain after surgery, pain medicine will help you feel better. Take it as directed, before pain becomes severe. Also, ask your healthcare provider or pharmacist about other ways to control pain. This might be with heat, ice, or relaxation. And follow any other instructions your surgeon or nurse gives you.      Stay on schedule with your medicine.     Tips for taking pain medicine  To get the best relief possible, remember these points:   Pain medicines can upset your stomach. Taking them with a little food may help.  Most pain relievers taken by mouth need at least 20 to 30 minutes to start to work.  Don't wait till your pain becomes severe before you take your medicine. Try to time your medicine so that you can take it  before starting an activity. This might be before you get dressed, go for a walk, or sit down for dinner.  Constipation is a common side effect of some pain medicines. Call your healthcare provider before taking any medicines such as laxatives or stool softeners to help ease constipation. Also ask if you should skip any foods. Drinking lots of fluids and eating foods such as fruits and vegetables that are high in fiber can also help. Remember, don't take laxatives unless your surgeon has prescribed them.  Drinking alcohol and taking pain medicine can cause dizziness and slow your breathing. It can even be deadly. Don't drink alcohol while taking pain medicine.  Pain medicine can make you react more slowly to things. Don't drive or run machinery while taking pain medicine.  Your healthcare provider may tell you to take acetaminophen to help ease your pain. Ask them how much you're supposed to take each day. Acetaminophen or other pain relievers may interact with your prescription medicines or other over-the-counter (OTC) medicines. Some prescription medicines have acetaminophen and other ingredients in them. Using both prescription and OTC acetaminophen for pain can cause you to accidentally overdose. Read the labels on your OTC medicines with care. This will help you to clearly know the list of ingredients, how much to take, and any warnings. It may also help you not take too much acetaminophen. If you have questions or don't understand the information, ask your pharmacist or healthcare provider to explain it to you before you take the OTC medicine.   Managing nausea  Some people have an upset stomach (nausea) after surgery. This is often because of anesthesia, pain, or pain medicine, less movement of food in the stomach, or the stress of surgery. These tips will help you handle nausea and eat healthy foods as you get better. If you were on a special food plan before surgery, ask your healthcare provider if you  should follow it while you get better. Check with your provider on how your eating should progress. It may depend on the surgery you had. These general tips may help:   Don't push yourself to eat. Your body will tell you when to eat and how much.  Start off with clear liquids and soup. They're easier to digest.  Next try semi-solid foods as you feel ready. These include mashed potatoes, applesauce, and gelatin.  Slowly move to solid foods. Don’t eat fatty, rich, or spicy foods at first.  Don't force yourself to have 3 large meals a day. Instead eat smaller amounts more often.  Take pain medicines with a small amount of solid food, such as crackers or toast. This helps prevent nausea.  When to call your healthcare provider  Call your healthcare provider right away if you have any of these:   You still have too much pain, or the pain gets worse, after taking the medicine. The medicine may not be strong enough. Or there may be a complication from the surgery.  You feel too sleepy, dizzy, or groggy. The medicine may be too strong.  Side effects such as nausea or vomiting. Your healthcare provider may advise taking other medicines to .  Skin changes such as rash, itching, or hives. This may mean you have an allergic reaction. Your provider may advise taking other medicines.  The incision looks different (for instance, part of it opens up).  Bleeding or fluid leaking from the incision site, and weren't told to expect that.  Fever of 100.4°F (38°C) or higher, or as directed by your provider.  Call 911  Call 911 right away if you have:   Trouble breathing  Facial swelling    If you have obstructive sleep apnea   You were given anesthesia medicine during surgery to keep you comfortable and free of pain. After surgery, you may have more apnea spells because of this medicine and other medicines you were given. The spells may last longer than normal.    At home:  Keep using the continuous positive airway pressure (CPAP) device  when you sleep. Unless your healthcare provider tells you not to, use it when you sleep, day or night. CPAP is a common device used to treat obstructive sleep apnea.  Talk with your provider before taking any pain medicine, muscle relaxants, or sedatives. Your provider will tell you about the possible dangers of taking these medicines.  Contact your provider if your sleeping changes a lot even when taking medicines as directed.  Maximino last reviewed this educational content on 10/1/2021  © 6517-0628 [employer]. All rights reserved. This information is not intended as a substitute for professional medical care. Always follow your healthcare professional's instructions.

## 2024-01-09 ENCOUNTER — ANESTHESIA (OUTPATIENT)
Dept: SURGERY | Facility: HOSPITAL | Age: 44
End: 2024-01-09
Payer: COMMERCIAL

## 2024-01-09 ENCOUNTER — ANESTHESIA EVENT (OUTPATIENT)
Dept: SURGERY | Facility: HOSPITAL | Age: 44
End: 2024-01-09
Payer: COMMERCIAL

## 2024-01-09 ENCOUNTER — HOSPITAL ENCOUNTER (OUTPATIENT)
Facility: HOSPITAL | Age: 44
Setting detail: HOSPITAL OUTPATIENT SURGERY
Discharge: HOME | End: 2024-01-09
Attending: OBSTETRICS & GYNECOLOGY | Admitting: OBSTETRICS & GYNECOLOGY
Payer: COMMERCIAL

## 2024-01-09 VITALS
OXYGEN SATURATION: 100 % | HEIGHT: 61 IN | SYSTOLIC BLOOD PRESSURE: 123 MMHG | HEART RATE: 72 BPM | WEIGHT: 159 LBS | RESPIRATION RATE: 16 BRPM | BODY MASS INDEX: 30.02 KG/M2 | DIASTOLIC BLOOD PRESSURE: 73 MMHG | TEMPERATURE: 98 F

## 2024-01-09 DIAGNOSIS — N84.0 ENDOMETRIAL POLYP: ICD-10-CM

## 2024-01-09 DIAGNOSIS — N92.0 MENORRHAGIA WITH REGULAR CYCLE: ICD-10-CM

## 2024-01-09 LAB — B-HCG UR QL: NEGATIVE

## 2024-01-09 PROCEDURE — 0UB98ZZ EXCISION OF UTERUS, VIA NATURAL OR ARTIFICIAL OPENING ENDOSCOPIC: ICD-10-PCS | Performed by: OBSTETRICS & GYNECOLOGY

## 2024-01-09 PROCEDURE — 58558 HYSTEROSCOPY BIOPSY: CPT | Performed by: OBSTETRICS & GYNECOLOGY

## 2024-01-09 RX ORDER — DEXTROSE MONOHYDRATE 25 G/50ML
50 INJECTION, SOLUTION INTRAVENOUS
Status: DISCONTINUED | OUTPATIENT
Start: 2024-01-09 | End: 2024-01-09

## 2024-01-09 RX ORDER — ONDANSETRON 2 MG/ML
4 INJECTION INTRAMUSCULAR; INTRAVENOUS EVERY 6 HOURS PRN
Status: DISCONTINUED | OUTPATIENT
Start: 2024-01-09 | End: 2024-01-09

## 2024-01-09 RX ORDER — ACETAMINOPHEN 500 MG
1000 TABLET ORAL ONCE AS NEEDED
Status: DISCONTINUED | OUTPATIENT
Start: 2024-01-09 | End: 2024-01-09

## 2024-01-09 RX ORDER — NICOTINE POLACRILEX 4 MG
15 LOZENGE BUCCAL
Status: DISCONTINUED | OUTPATIENT
Start: 2024-01-09 | End: 2024-01-09

## 2024-01-09 RX ORDER — NICOTINE POLACRILEX 4 MG
30 LOZENGE BUCCAL
Status: DISCONTINUED | OUTPATIENT
Start: 2024-01-09 | End: 2024-01-09

## 2024-01-09 RX ORDER — NALOXONE HYDROCHLORIDE 0.4 MG/ML
0.08 INJECTION, SOLUTION INTRAMUSCULAR; INTRAVENOUS; SUBCUTANEOUS AS NEEDED
Status: DISCONTINUED | OUTPATIENT
Start: 2024-01-09 | End: 2024-01-09

## 2024-01-09 RX ORDER — HYDROMORPHONE HYDROCHLORIDE 1 MG/ML
0.2 INJECTION, SOLUTION INTRAMUSCULAR; INTRAVENOUS; SUBCUTANEOUS EVERY 5 MIN PRN
Status: DISCONTINUED | OUTPATIENT
Start: 2024-01-09 | End: 2024-01-09

## 2024-01-09 RX ORDER — ONDANSETRON 2 MG/ML
INJECTION INTRAMUSCULAR; INTRAVENOUS AS NEEDED
Status: DISCONTINUED | OUTPATIENT
Start: 2024-01-09 | End: 2024-01-09 | Stop reason: SURG

## 2024-01-09 RX ORDER — LIDOCAINE HYDROCHLORIDE 10 MG/ML
INJECTION, SOLUTION EPIDURAL; INFILTRATION; INTRACAUDAL; PERINEURAL AS NEEDED
Status: DISCONTINUED | OUTPATIENT
Start: 2024-01-09 | End: 2024-01-09 | Stop reason: SURG

## 2024-01-09 RX ORDER — SODIUM CHLORIDE, SODIUM LACTATE, POTASSIUM CHLORIDE, CALCIUM CHLORIDE 600; 310; 30; 20 MG/100ML; MG/100ML; MG/100ML; MG/100ML
INJECTION, SOLUTION INTRAVENOUS CONTINUOUS
Status: DISCONTINUED | OUTPATIENT
Start: 2024-01-09 | End: 2024-01-09

## 2024-01-09 RX ORDER — HYDROMORPHONE HYDROCHLORIDE 1 MG/ML
0.4 INJECTION, SOLUTION INTRAMUSCULAR; INTRAVENOUS; SUBCUTANEOUS EVERY 5 MIN PRN
Status: DISCONTINUED | OUTPATIENT
Start: 2024-01-09 | End: 2024-01-09

## 2024-01-09 RX ORDER — HYDROMORPHONE HYDROCHLORIDE 1 MG/ML
0.6 INJECTION, SOLUTION INTRAMUSCULAR; INTRAVENOUS; SUBCUTANEOUS EVERY 5 MIN PRN
Status: DISCONTINUED | OUTPATIENT
Start: 2024-01-09 | End: 2024-01-09

## 2024-01-09 RX ORDER — MORPHINE SULFATE 10 MG/ML
6 INJECTION, SOLUTION INTRAMUSCULAR; INTRAVENOUS EVERY 10 MIN PRN
Status: DISCONTINUED | OUTPATIENT
Start: 2024-01-09 | End: 2024-01-09

## 2024-01-09 RX ORDER — DEXAMETHASONE SODIUM PHOSPHATE 4 MG/ML
VIAL (ML) INJECTION AS NEEDED
Status: DISCONTINUED | OUTPATIENT
Start: 2024-01-09 | End: 2024-01-09 | Stop reason: SURG

## 2024-01-09 RX ORDER — MORPHINE SULFATE 4 MG/ML
2 INJECTION, SOLUTION INTRAMUSCULAR; INTRAVENOUS EVERY 10 MIN PRN
Status: DISCONTINUED | OUTPATIENT
Start: 2024-01-09 | End: 2024-01-09

## 2024-01-09 RX ORDER — MORPHINE SULFATE 4 MG/ML
4 INJECTION, SOLUTION INTRAMUSCULAR; INTRAVENOUS EVERY 10 MIN PRN
Status: DISCONTINUED | OUTPATIENT
Start: 2024-01-09 | End: 2024-01-09

## 2024-01-09 RX ORDER — ACETAMINOPHEN 500 MG
1000 TABLET ORAL ONCE
Status: COMPLETED | OUTPATIENT
Start: 2024-01-09 | End: 2024-01-09

## 2024-01-09 RX ADMIN — DEXAMETHASONE SODIUM PHOSPHATE 4 MG: 4 MG/ML VIAL (ML) INJECTION at 12:08:00

## 2024-01-09 RX ADMIN — SODIUM CHLORIDE, SODIUM LACTATE, POTASSIUM CHLORIDE, CALCIUM CHLORIDE: 600; 310; 30; 20 INJECTION, SOLUTION INTRAVENOUS at 12:08:00

## 2024-01-09 RX ADMIN — LIDOCAINE HYDROCHLORIDE 50 MG: 10 INJECTION, SOLUTION EPIDURAL; INFILTRATION; INTRACAUDAL; PERINEURAL at 12:08:00

## 2024-01-09 RX ADMIN — ONDANSETRON 4 MG: 2 INJECTION INTRAMUSCULAR; INTRAVENOUS at 12:08:00

## 2024-01-09 NOTE — INTERVAL H&P NOTE
Pre-op Diagnosis: Endometrial polyp [N84.0]  Menorrhagia with regular cycle [N92.0]    The above referenced H&P was reviewed by Abril Plascencia MD on 1/9/2024, the patient was examined and no significant changes have occurred in the patient's condition since the H&P was performed.  I discussed with the patient and/or legal representative the potential benefits, risks and side effects of this procedure; the likelihood of the patient achieving goals; and potential problems that might occur during recuperation.  I discussed reasonable alternatives to the procedure, including risks, benefits and side effects related to the alternatives and risks related to not receiving this procedure.  We will proceed with procedure as planned.

## 2024-01-09 NOTE — ANESTHESIA PREPROCEDURE EVALUATION
Anesthesia PreOp Note    HPI:     Annabella Live is a 43 year old female who presents for preoperative consultation requested by: Abril Plascencia MD    Date of Surgery: 1/9/2024    Procedure(s):  Hysteroscopy polypectomy/ curettage  Indication: Endometrial polyp [N84.0]  Menorrhagia with regular cycle [N92.0]    Relevant Problems   No relevant active problems       NPO:  Last Liquid Consumption Date: 01/08/24  Last Liquid Consumption Time: 2230  Last Solid Consumption Date: 01/08/24  Last Solid Consumption Time: 2230  Last Liquid Consumption Date: 01/08/24          History Review:  Patient Active Problem List    Diagnosis Date Noted    Hx of migraines 09/26/2023    Menorrhagia with regular cycle 09/26/2023    Obesity (BMI 30-39.9) 04/03/2023    Fatty liver 04/03/2023    Hx gestational diabetes 04/03/2023    Recurrent kidney stones 04/03/2023    Dyslipidemia 10/13/2022    Mild intermittent asthma without complication 07/26/2021       Past Medical History:   Diagnosis Date    Anesthesia complication     Anxiety     Asthma     Calculus of kidney     Environmental allergies     Fatty liver 04/03/2023    Gestational diabetes     High cholesterol 11/17/2022    History of anemia     History of migraine     Kidney stone 11/25/2022    Migraines     PONV (postoperative nausea and vomiting)     Recurrent kidney stones 04/03/2023    Visual impairment        Past Surgical History:   Procedure Laterality Date    COLONOSCOPY N/A 03/08/2023    Procedure: COLONOSCOPY;  Surgeon: Fausto Cosme MD;  Location: [facility] ENDO    CYSTOSCOPY,INSERT URETERAL STENT      WISDOM TEETH REMOVED      at age 18        Medications Prior to Admission   Medication Sig Dispense Refill Last Dose    BIOTIN OR Take 1 capsule by mouth daily.   1/2/2024    buPROPion  MG Oral Tablet 24 Hr Take 1 tablet (150 mg total) by mouth daily. 90 tablet 0 1/2/2024    Phentermine HCl 37.5 MG Oral Tab Take 1 tablet (37.5 mg total) by mouth every morning before  breakfast. 30 tablet 2 1/2/2024    acidophilus-pectin Oral Cap Take 1 capsule by mouth daily.   1/2/2024    montelukast 10 MG Oral Tab Take 1 tablet (10 mg total) by mouth nightly. 90 tablet 1 Past Month    prenatal vitamin w/DHA 27-0.8-228 MG Oral Cap Take 1 capsule by mouth daily.   1/2/2024    clobetasol 0.05 % External Solution Apply 1 mL topically daily as needed. 60 mL 3 More than a month    SUMAtriptan 25 MG Oral Tab Take 1 tablet (25 mg total) by mouth every 2 (two) hours as needed for Migraine (max 2 doses per day). 9 tablet 0 More than a month    hydrOXYzine 25 MG Oral Tab Take 1 tablet (25 mg total) by mouth nightly as needed. (Patient not taking: Reported on 12/18/2023) 60 tablet 0     Budesonide (PULMICORT FLEXHALER) 90 MCG/ACT Inhalation Aerosol Powder, Breath Activated Inhale 1 puff into the lungs 2 (two) times daily as needed. 2 each 2 More than a month    albuterol 108 (90 Base) MCG/ACT Inhalation Aero Soln Inhale 2 puffs into the lungs every 6 (six) hours as needed for Wheezing or Shortness of Breath. 1 each 0 More than a month    Blood Glucose Monitoring Suppl (ONETOUCH VERIO FLEX SYSTEM) w/Device Does not apply Kit 1 kit by Does not apply route 2 (two) times daily. May substitute per insurance formulary 1 kit 0 More than a month     Current Facility-Administered Medications Ordered in Epic   Medication Dose Route Frequency Provider Last Rate Last Admin    lactated ringers infusion   Intravenous Continuous Abril Plascencia MD 20 mL/hr at 01/09/24 1046 New Bag at 01/09/24 1046     No current [facility]-ordered outpatient medications on file.       No Known Allergies    Family History   Problem Relation Age of Onset    Bipolar Disorder Mother     Diabetes Father     Glaucoma Father     Breast Cancer Maternal Grandmother 45    Other (bone cancer) Maternal Grandmother     No Known Problems Maternal Grandfather     Diabetes Paternal Grandmother     Heart Disease Paternal Grandfather     Blindness Paternal  Grandfather     Heart Disease Maternal Aunt 40    Heart Disease Maternal Aunt 40    Breast Cancer Paternal Cousin 38        twin sisters (unsure of BRCA status)    Breast Cancer Paternal Cousin 38        twin sisters (unsure of BRCA status)    No Known Problems Brother          of meningitis    Breast Cancer Maternal Aunt     Dementia Maternal Aunt     Other (Other) Maternal Aunt         eye cancer     Heart Disease Maternal Aunt     Diabetes Paternal Aunt     Ovarian Cancer Neg     Colon Cancer Neg      Social History     Socioeconomic History    Marital status:    Occupational History    Occupation: [employer]   Tobacco Use    Smoking status: Never     Passive exposure: Never    Smokeless tobacco: Never   Vaping Use    Vaping Use: Never used   Substance and Sexual Activity    Alcohol use: Not Currently    Drug use: Never    Sexual activity: Yes     Partners: Male   Other Topics Concern    Blood Transfusions No    Breast feeding No    Reaction to local anesthetic No    Pt has a pacemaker No    Pt has a defibrillator No       Available pre-op labs reviewed.  Lab Results   Component Value Date    URINEPREG Negative 2024             Vital Signs:  Body mass index is 30.04 kg/m².   height is 1.549 m (5' 1\") and weight is 72.1 kg (159 lb). Her oral temperature is 98.4 °F (36.9 °C). Her blood pressure is 123/82 and her pulse is 86. Her respiration is 20 and oxygen saturation is 100%.   Vitals:    24 1317 24 1035   BP:  123/82   Pulse:  86   Resp:  20   Temp:  98.4 °F (36.9 °C)   TempSrc:  Oral   SpO2:  100%   Weight: 70.8 kg (156 lb) 72.1 kg (159 lb)   Height: 1.549 m (5' 1\") 1.549 m (5' 1\")        Anesthesia Evaluation     Patient summary reviewed and Nursing notes reviewed    History of anesthetic complications   Airway   Mallampati: III  TM distance: >3 FB  Neck ROM: full  Dental - Dentition appears grossly intact     Pulmonary - normal exam   (+) asthma  Cardiovascular - negative ROS and normal  exam    Neuro/Psych    (+)  anxiety/panic attacks,        GI/Hepatic/Renal    (+) liver disease    Endo/Other    Abdominal   (+) obese                 Anesthesia Plan:   ASA:  2  Plan:   General  Airway:  LMA  Post-op Pain Management: Local and IV analgesics  Informed Consent Plan and Risks Discussed With:  Patient  Discussed plan with:  Surgeon      I have informed Annabella Live and/or legal guardian or family member of the nature of the anesthetic plan, benefits, risks including possible dental damage if relevant, major complications, and any alternative forms of anesthetic management.   All of the patient's questions were answered to the best of my ability. The patient desires the anesthetic management as planned.  MANUEL REYES MD  1/9/2024 11:54 AM  Present on Admission:  **None**

## 2024-01-09 NOTE — ANESTHESIA POSTPROCEDURE EVALUATION
Patient: Annabella Live    Procedure Summary       Date: 01/09/24 Room / Location: [facility] MAIN OR 03 / EM MAIN OR    Anesthesia Start: 1200 Anesthesia Stop:     Procedure: Hysteroscopy polypectomy/ curettage Diagnosis:       Endometrial polyp      Menorrhagia with regular cycle      (Endometrial polyp [N84.0]Menorrhagia with regular cycle [N92.0])    Surgeons: Abril Plascencia MD Anesthesiologist: Gladis Allan MD    Anesthesia Type: general ASA Status: 2            Anesthesia Type: general    Vitals Value Taken Time   /69 01/09/24 1253   Temp 97.5 °F (36.4 °C) 01/09/24 1253   Pulse 81 01/09/24 1254   Resp 19 01/09/24 1254   SpO2 99 % 01/09/24 1254   Vitals shown include unfiled device data.    EM AN Post Evaluation:   Patient Evaluated in PACU  Patient Participation: complete - patient participated  Level of Consciousness: awake  Pain Management: adequate  Airway Patency:patent  Dental exam unchanged from preop  Yes    Cardiovascular Status: acceptable  Respiratory Status: acceptable  Postoperative Hydration acceptable      GLADIS ALLAN MD  1/9/2024 12:54 PM

## 2024-01-09 NOTE — ANESTHESIA PROCEDURE NOTES
Airway  Date/Time: 1/9/2024 12:09 PM  Urgency: Elective      General Information and Staff    Patient location during procedure: OR  Anesthesiologist: Gladis Allan MD  Performed: anesthesiologist   Performed by: Gladis Allan MD  Authorized by: Gladis Allan MD      Indications and Patient Condition  Indications for airway management: anesthesia  Sedation level: deep  Preoxygenated: yes  Patient position: sniffing  Mask difficulty assessment: 1 - vent by mask    Final Airway Details  Final airway type: supraglottic airway      Successful airway: classic  Size 4       Number of attempts at approach: 1

## 2024-01-09 NOTE — OPERATIVE REPORT
[facility]    Gyne Detailed Operative Note    Annabella Live Patient Status:  Hospital Outpatient Surgery    3/3/1980 MRN [id number]   Location [facility] OPERATING ROOM Attending Abril Plascencia MD   Hosp Day # 0 PCP Rita Boggs DO     Preoperative Diagnosis: Endometrial polyp [N84.0]  Menorrhagia with regular cycle [N92.0]    Postoperative Diagnosis:  Endometrial polyp [N84.0]  Menorrhagia with regular cycle [N92.0]    Procedures:    Hysteroscopy polypectomy/ curettage    Primary Surgeon:   Abril Plascencia MD    Assistant:    none    Anesthesia:    General    Estimated Blood Loss:  Less than 10 CC    Antibiotics:     none    Complications:   none    Specimens:      Specimens (From admission, onward)      None            Surgical Findings:  Areas of hyperplastic tissue anteriorly & posteriorly -- no definitive polyp -- see images    Condition: stable    Procedure Note:  Patient taken to the OR where she was placed under general anesthesia, prepped and draped in the normal usual sterile manner in dorsal lithotomy position.  Bladder was emptied via straight catheter.  Pelvic exam done which confirmed anterverted uterus. Weighted speculum placed in posterior vagina. Cervix grasped with single tooth tenaculum at 12 o'clock position.  Cervix dilated using #7 Sue dilators. Operative Truclear hysteroscope placed into cavity.  Inspection revealed normal ostia, cavity with  hyperplastic tissue .  Myosure device used to remove hyperplastic tissue in toto & without difficulty. Hysteroscope was removed. Single tooth tenaculum was removed. AgNO3 used to make site of application hemostatic.  Patient was taken to the recovery room in good condition.  All sponge, needle count were correct x 2.      Abril Plascencia MD  2024

## 2024-01-09 NOTE — H&P
[facility]    Gyne History & Physical    Annabella Live Patient Status:  Hospital Outpatient Surgery    3/3/1980 MRN [id number]   Location [facility] OPERATING ROOM Attending Abril Plascencia MD   Hosp Day # 0 PCP Rita Boggs DO       Reason for Admission:   Endometrial polyp    History of Present Illness:   Patient is a(n) 43 year old  female Patient's last menstrual period was 2023 (approximate). who presented with hx heavy periods & evaluations w/ embx / endosee w/ polyp here for outpatient surgery.    Past Medical History  Past Medical History:   Diagnosis Date    Anesthesia complication     Anxiety     Asthma     Calculus of kidney     Environmental allergies     Fatty liver 2023    Gestational diabetes     High cholesterol 2022    History of anemia     History of migraine     Kidney stone 2022    Migraines     PONV (postoperative nausea and vomiting)     Recurrent kidney stones 2023    Visual impairment        Past Surgical History  Past Surgical History:   Procedure Laterality Date    COLONOSCOPY N/A 2023    Procedure: COLONOSCOPY;  Surgeon: Fausto Cosme MD;  Location: [facility] ENDO    CYSTOSCOPY,INSERT URETERAL STENT      WISDOM TEETH REMOVED      at age 18        Family History  Family History   Problem Relation Age of Onset    Bipolar Disorder Mother     Diabetes Father     Glaucoma Father     Breast Cancer Maternal Grandmother 45    Other (bone cancer) Maternal Grandmother     No Known Problems Maternal Grandfather     Diabetes Paternal Grandmother     Heart Disease Paternal Grandfather     Blindness Paternal Grandfather     Heart Disease Maternal Aunt 40    Heart Disease Maternal Aunt 40    Breast Cancer Paternal Cousin 38        twin sisters (unsure of BRCA status)    Breast Cancer Paternal Cousin 38        twin sisters (unsure of BRCA status)    No Known Problems Brother          of meningitis    Breast Cancer Maternal Aunt      Dementia Maternal Aunt     Other (Other) Maternal Aunt         eye cancer     Heart Disease Maternal Aunt     Diabetes Paternal Aunt     Ovarian Cancer Neg     Colon Cancer Neg        Social History  Social History     Socioeconomic History    Marital status:    Occupational History    Occupation:    Tobacco Use    Smoking status: Never     Passive exposure: Never    Smokeless tobacco: Never   Vaping Use    Vaping Use: Never used   Substance and Sexual Activity    Alcohol use: Not Currently    Drug use: Never    Sexual activity: Yes     Partners: Male   Other Topics Concern    Blood Transfusions No    Breast feeding No    Reaction to local anesthetic No    Pt has a pacemaker No    Pt has a defibrillator No   Social History Narrative    Live with  (Bryan)    No h/o abuse           Past OB History:  OB History    Para Term  AB Living   1 1 1     1   SAB IAB Ectopic Multiple Live Births         0 1      # Outcome Date GA Lbr Dav/2nd Weight Sex Delivery Anes PTL Lv   1 Term 22 39w0d 01:24 / 00:07 6 lb 10.9 oz (3.03 kg) M NORMAL SPONT None N FLAKO        Past GYN History:   Periods: monthly / heavy  Hx STD: none  BCM: vasectomy    Medications / Allergies:   Outpatient meds:     Current Outpatient Medications:     BIOTIN OR, Take 1 capsule by mouth daily., Disp: , Rfl:     buPROPion  MG Oral Tablet 24 Hr, Take 1 tablet (150 mg total) by mouth daily., Disp: 90 tablet, Rfl: 0    Phentermine HCl 37.5 MG Oral Tab, Take 1 tablet (37.5 mg total) by mouth every morning before breakfast., Disp: 30 tablet, Rfl: 2    acidophilus-pectin Oral Cap, Take 1 capsule by mouth daily., Disp: , Rfl:     Budesonide (PULMICORT FLEXHALER) 90 MCG/ACT Inhalation Aerosol Powder, Breath Activated, Inhale 1 puff into the lungs 2 (two) times daily as needed., Disp: 2 each, Rfl: 2    montelukast 10 MG Oral Tab, Take 1 tablet (10 mg total) by mouth nightly., Disp: 90 tablet, Rfl: 1    albuterol 108 (90  Base) MCG/ACT Inhalation Aero Soln, Inhale 2 puffs into the lungs every 6 (six) hours as needed for Wheezing or Shortness of Breath., Disp: 1 each, Rfl: 0    prenatal vitamin w/DHA 27-0.8-228 MG Oral Cap, Take 1 capsule by mouth daily., Disp: , Rfl:     clobetasol 0.05 % External Solution, Apply 1 mL topically daily as needed., Disp: 60 mL, Rfl: 3    SUMAtriptan 25 MG Oral Tab, Take 1 tablet (25 mg total) by mouth every 2 (two) hours as needed for Migraine (max 2 doses per day)., Disp: 9 tablet, Rfl: 0    hydrOXYzine 25 MG Oral Tab, Take 1 tablet (25 mg total) by mouth nightly as needed. (Patient not taking: Reported on 12/18/2023), Disp: 60 tablet, Rfl: 0    Blood Glucose Monitoring Suppl (ONETOUCH VERIO FLEX SYSTEM) w/Device Does not apply Kit, 1 kit by Does not apply route 2 (two) times daily. May substitute per insurance formulary, Disp: 1 kit, Rfl: 0    Current Inpatient Medications:  No current facility-administered medications for this encounter.       Allergies  No Known Allergies    Review of Systems:      10 point ROS completed and was negative, except for pertinent positive and negatives stated in subjective.    Physical Exam:   Vital Signs:  No data found.    No data recorded.        General: No acute distress. Alert and oriented x 3.  HEENT: Moist mucous membranes.   Respiratory: Nonlabored breathing  Cardiovascular: Regular rate    Abdomen: Soft, nontender, nondistended.    Pelvic: defer to OR  Musculoskeletal: No swelling noted.  Integument: No lesions. No unusual erythema.  Psychiatric: Appropriate mood and affect.    Results:     Laboratory Data:  Lab Results   Component Value Date    WBC 7.8 08/14/2023    HGB 13.3 08/14/2023    HCT 42.5 08/14/2023    .0 08/14/2023    CREATSERUM 0.78 08/14/2023    BUN 16 08/14/2023     08/14/2023    K 4.0 08/14/2023     08/14/2023    CO2 25.0 08/14/2023    GLU 86 08/14/2023    CA 9.3 08/14/2023    ALB 3.8 08/14/2023    ALKPHO 67 08/14/2023    BILT  0.2 08/14/2023    TP 7.3 08/14/2023    AST 15 08/14/2023    ALT 25 08/14/2023    INR 0.95 07/05/2023    TSH 1.460 08/14/2023    B12 891 08/14/2023       No results for input(s): \"RBC\", \"HGB\", \"HCT\", \"MCV\", \"MCH\", \"MCHC\", \"RDW\", \"NEPRELIM\", \"WBC\", \"PLT\" in the last 168 hours.  No results for input(s): \"GLU\", \"BUN\", \"CREATSERUM\", \"GFRAA\", \"GFRNAA\", \"CA\", \"ALB\", \"NA\", \"K\", \"CL\", \"CO2\", \"ALKPHO\", \"AST\", \"ALT\", \"BILT\", \"TP\" in the last 168 hours.  Lab Results   Component Value Date    INR 0.95 07/05/2023    INR 0.99 05/08/2023       Culture:  No results found for this visit on 01/09/24.      Imaging:  US PELVIS W EV (CPT=76856/02704)    Result Date: 10/15/2023  PROCEDURE: US PELVIS W EV (CPT=76856/70956)  COMPARISON: [facility], CT ABDOMEN + PELVIS KIDNEYSTONE 2D RNDR (NO IV NO ORAL) (CPT=741, 11/30/2022, 8:54 PM.  [facility] [place], US ABDOMEN COMPLETE (CPT=76700), 2/24/2023, 7:25 AM.  INDICATIONS: N92.0 Menorrhagia with regular cycle  TECHNIQUE: Pelvic ultrasound using transabdominal and transvaginal technique.  A transvaginal scan was performed for menorrhagia.  FINDINGS:   UTERUS:   Size is 7.7 x 4.3 x 5.9 cm.  The uterine volume is 103 mL.  The uterus is retroverted.  ENDOMETRIUM: Endometrial thickness is 11 mm.  No fluid or mass in the endometrial canal.  MYOMETRIUM: Normal echogenicity.  There is a small cyst in the myometrium of the fundus that measures 2 mm.  There are small nabothian cysts.   OVARIES AND ADNEXA:   RIGHT:   The right ovary measures 2.4 x 1.2 x 1.9 cm.  Normal appearance with no masses.  The dominant right ovarian follicle measures 1.1 cm. LEFT:   The left ovary measures 3.5 x 2.6 x 3.2 cm.  Normal appearance with no masses.  The dominant left ovarian follicle measures 2.0 cm.  CUL-DE-SAC:   Normal.  No free fluid or mass.  OTHER: Negative.  Bladder appears normal.          CONCLUSION:    1. Retroverted uterus with a volume of 103 mL. 2. Endometrial thickness  of 11 mm. 3. Unremarkable appearance of the bilateral ovaries with scattered follicles. 4. A 2 millimeter myometrial cyst is present.    Dictated by (CST): Josue Hammer MD on 10/15/2023 at 9:42 PM     Finalized by (CST): Josue Hammer MD on 10/15/2023 at 9:47 PM          Results for orders placed or performed in visit on 11/06/23   Specimen to Pathology Tissue   Result Value Ref Range    Case Report       Surgical Pathology                                Case: [id number]                                  Authorizing Provider:  Abril Plascencia MD         Collected:           11/06/2023 10:47 AM          Ordering Location:     [facility]    Received:            11/06/2023 10:47 AM                                 [facility] - OB/GYN                                                            Pathologist:           Kyra Carmona MD                                                        Specimen:    Endometrium, EMBX                                                                          Final Diagnosis:         Endometrium; biopsy:   Fragments of proliferative phase endometrium, negative for hyperplasia or malignancy.  Fragment of benign endometrial polyp.        Embedded Images      Clinical Information       N92.0 Menorrhagia With Regular Cycle.  N92.0 Excessive Or Frequent Menstruation.         Gross Description       The specimen is submitted in formalin labeled \"Live, endometrium\" and consists of multiple fragments of tan, soft tissue and blood clots, measuring in aggregate 3.2 x 2.6 x 0.6 cm. The specimen is submitted entirely in cassette A1. (jq)     Kyra Carmona M.D./[facility]        Interpretation Benign           Impression:   Endometrial polyp      Recommendations:  For hysteroscopic polypectomy / currettage      Abril Plascencia MD  1/8/2024

## 2024-01-11 ENCOUNTER — HOSPITAL ENCOUNTER (OUTPATIENT)
Dept: MRI IMAGING | Facility: HOSPITAL | Age: 44
Discharge: HOME | End: 2024-01-11
Attending: FAMILY MEDICINE
Payer: COMMERCIAL

## 2024-01-11 DIAGNOSIS — R92.30 DENSE BREAST TISSUE ON MAMMOGRAM, UNSPECIFIED TYPE: ICD-10-CM

## 2024-01-11 DIAGNOSIS — Z91.89 AT HIGH RISK FOR BREAST CANCER: ICD-10-CM

## 2024-01-11 DIAGNOSIS — Z80.3 FAMILY HISTORY OF BREAST CANCER: ICD-10-CM

## 2024-01-11 PROCEDURE — 77049 MRI BREAST C-+ W/CAD BI: CPT | Performed by: FAMILY MEDICINE

## 2024-01-11 PROCEDURE — A9575 INJ GADOTERATE MEGLUMI 0.1ML: HCPCS | Performed by: FAMILY MEDICINE

## 2024-01-11 RX ORDER — GADOTERATE MEGLUMINE 376.9 MG/ML
15 INJECTION INTRAVENOUS
Status: COMPLETED | OUTPATIENT
Start: 2024-01-11 | End: 2024-01-11

## 2024-01-11 RX ADMIN — GADOTERATE MEGLUMINE 15 ML: 376.9 INJECTION INTRAVENOUS at 17:34:00

## 2024-01-12 DIAGNOSIS — R92.8 ABNORMAL MRI, BREAST: Primary | ICD-10-CM

## 2024-01-12 DIAGNOSIS — R92.30 DENSE BREAST TISSUE ON MAMMOGRAM, UNSPECIFIED TYPE: ICD-10-CM

## 2024-01-12 DIAGNOSIS — Z80.3 FAMILY HISTORY OF BREAST CANCER: ICD-10-CM

## 2024-01-12 DIAGNOSIS — Z91.89 AT HIGH RISK FOR BREAST CANCER: ICD-10-CM

## 2024-01-18 ENCOUNTER — TELEPHONE (OUTPATIENT)
Dept: OBGYN CLINIC | Facility: CLINIC | Age: 44
End: 2024-01-18

## 2024-01-18 NOTE — TELEPHONE ENCOUNTER
Could be also related to kidney stone. Push hydration. If fever, needs pelvic exam to r/o infection from surgery

## 2024-01-18 NOTE — TELEPHONE ENCOUNTER
Pt had Hysteroscopy polypectomy/ curettage w/[facility] on 1/9/2024. Calling today c/o abdominal cramping that has been on and off since Thursday.     Pt states Thursday she had cramping, thought she was starting her cycle because she also started a moderate flow. But pt states within 24 hrs the bleeding reduced to only spotting of light pink which continues to today.     Pt did go to IC yesterday d/t pain being 10/10. Abdominal xray noted moderate stool and possible kidney stones on the right side. UA noted some blood, but no signs of infection. Pt was give ES Tylenol and pain resolved. Pt states no pain again until this morning, noted it was 7/10; localized to only the left side of abdomen and back. Did not take any pain medication; rested in bed for an hour and it went away.     Pt has noted constipation, but was able to stool yesterday. States she is passing gas. Pt is also due for her cycle in the next 4 days. We discussed possible pain r/t constipation. Recommend high fiber, colace, MOM, prune juice, 64 oz of H2) and ambulation to help with stools. Also informed can use heating pad to the area or warm shower. Also encouraged ES Tylenol when pain starts. Bleeding and pain precautions given. Pt states understanding.     Has f/u on 1/23 w/CHLOÉ    To [facility] to review and advise. Thank you.

## 2024-01-18 NOTE — TELEPHONE ENCOUNTER
Pt had surgery on the 9th and states on the 16th started with stomach cramping and bleeding, yesterday had cramping again, had xray in urgent care and everything was normal. Please advise cramping continues today

## 2024-01-19 ENCOUNTER — TELEPHONE (OUTPATIENT)
Dept: SURGERY | Facility: CLINIC | Age: 44
End: 2024-01-19

## 2024-01-19 NOTE — TELEPHONE ENCOUNTER
-S/w pt; identity verified with name & .  -Pt clarified \"I do not have lower back pain.\"  -She reports decreased output in last 24 hours.    -On 24 to UC for Constipation; treated w/ OTC Rx.  Experienced relief on 24.  -I explained she may be dehydrated from the constipation tx; this includes no hydration during sleeping hours.  -Pt \"does not feel full\" or \"pressure\" like needing to void.  -She will monitor hydration this a.m. & call-back if voiding does not resume.  -Pt knows to contact Call-Center vs send a [facility] message.  -Outcome pending.

## 2024-01-23 ENCOUNTER — OFFICE VISIT (OUTPATIENT)
Dept: OBGYN CLINIC | Facility: CLINIC | Age: 44
End: 2024-01-23

## 2024-01-23 VITALS
WEIGHT: 162.81 LBS | HEART RATE: 88 BPM | DIASTOLIC BLOOD PRESSURE: 75 MMHG | BODY MASS INDEX: 31 KG/M2 | SYSTOLIC BLOOD PRESSURE: 112 MMHG

## 2024-01-23 DIAGNOSIS — N92.0 MENORRHAGIA WITH REGULAR CYCLE: Primary | ICD-10-CM

## 2024-01-23 PROCEDURE — 3078F DIAST BP <80 MM HG: CPT | Performed by: OBSTETRICS & GYNECOLOGY

## 2024-01-23 PROCEDURE — 99213 OFFICE O/P EST LOW 20 MIN: CPT | Performed by: OBSTETRICS & GYNECOLOGY

## 2024-01-23 PROCEDURE — 3074F SYST BP LT 130 MM HG: CPT | Performed by: OBSTETRICS & GYNECOLOGY

## 2024-01-24 NOTE — PROGRESS NOTES
Annabella Live is a 43 year old female  Patient's last menstrual period was 2024 (approximate).   Chief Complaint   Patient presents with    Follow - Up     Post-op // UC f/u -pt had cramping and bleeding after procedure -- now resolved   . Last period better than prior to hysteroscopic currettage    OBSTETRICS HISTORY:  OB History    Para Term  AB Living   1 1 1 0 0 1   SAB IAB Ectopic Multiple Live Births   0 0 0 0 1       GYNE HISTORY:  Periods regular monthly    History   Sexual Activity    Sexual activity: Yes    Partners: Male       MEDICAL HISTORY:  Past Medical History:   Diagnosis Date    Anesthesia complication     Anxiety     Asthma     Calculus of kidney     Environmental allergies     Fatty liver 2023    Gestational diabetes     High cholesterol 2022    History of anemia     History of migraine     Kidney stone 2022    Migraines     PONV (postoperative nausea and vomiting)     Recurrent kidney stones 2023    Visual impairment      Past Surgical History:   Procedure Laterality Date    COLONOSCOPY N/A 2023    Procedure: COLONOSCOPY;  Surgeon: Fausto Cosme MD;  Location: [facility] ENDO    CYSTOSCOPY,INSERT URETERAL STENT      WISDOM TEETH REMOVED      at age 18      OB History    Para Term  AB Living   1 1 1 0 0 1   SAB IAB Ectopic Multiple Live Births   0 0 0 0 1        SOCIAL HISTORY:  Social History     Socioeconomic History    Marital status:      Spouse name: Not on file    Number of children: Not on file    Years of education: Not on file    Highest education level: Not on file   Occupational History    Occupation: [employer]   Tobacco Use    Smoking status: Never     Passive exposure: Never    Smokeless tobacco: Never   Vaping Use    Vaping Use: Never used   Substance and Sexual Activity    Alcohol use: Not Currently    Drug use: Never    Sexual activity: Yes     Partners: Male   Other Topics Concern     Service Not  Asked    Blood Transfusions No    Caffeine Concern Not Asked    Occupational Exposure Not Asked    Hobby Hazards Not Asked    Sleep Concern Not Asked    Stress Concern Not Asked    Weight Concern Not Asked    Special Diet Not Asked    Back Care Not Asked    Exercise Not Asked    Bike Helmet Not Asked    Seat Belt Not Asked    Self-Exams Not Asked    Grew up on a farm Not Asked    History of tanning Not Asked    Outdoor occupation Not Asked    Breast feeding No    Reaction to local anesthetic No    Pt has a pacemaker No    Pt has a defibrillator No   Social History Narrative    Live with  (Bryan)    No h/o abuse     Social Determinants of Health     Financial Resource Strain: Not on file   Food Insecurity: Not on file   Transportation Needs: Not on file   Physical Activity: Not on file   Stress: Not on file   Social Connections: Not on file   Housing Stability: Not on file       MEDICATIONS:    Current Outpatient Medications:     BIOTIN OR, Take 1 capsule by mouth daily., Disp: , Rfl:     buPROPion  MG Oral Tablet 24 Hr, Take 1 tablet (150 mg total) by mouth daily., Disp: 90 tablet, Rfl: 0    Phentermine HCl 37.5 MG Oral Tab, Take 1 tablet (37.5 mg total) by mouth every morning before breakfast., Disp: 30 tablet, Rfl: 2    clobetasol 0.05 % External Solution, Apply 1 mL topically daily as needed., Disp: 60 mL, Rfl: 3    SUMAtriptan 25 MG Oral Tab, Take 1 tablet (25 mg total) by mouth every 2 (two) hours as needed for Migraine (max 2 doses per day)., Disp: 9 tablet, Rfl: 0    acidophilus-pectin Oral Cap, Take 1 capsule by mouth daily., Disp: , Rfl:     Budesonide (PULMICORT FLEXHALER) 90 MCG/ACT Inhalation Aerosol Powder, Breath Activated, Inhale 1 puff into the lungs 2 (two) times daily as needed., Disp: 2 each, Rfl: 2    montelukast 10 MG Oral Tab, Take 1 tablet (10 mg total) by mouth nightly., Disp: 90 tablet, Rfl: 1    albuterol 108 (90 Base) MCG/ACT Inhalation Aero Soln, Inhale 2 puffs into the  lungs every 6 (six) hours as needed for Wheezing or Shortness of Breath., Disp: 1 each, Rfl: 0    prenatal vitamin w/DHA 27-0.8-228 MG Oral Cap, Take 1 capsule by mouth daily., Disp: , Rfl:     Blood Glucose Monitoring Suppl (ONETOUCH VERIO FLEX SYSTEM) w/Device Does not apply Kit, 1 kit by Does not apply route 2 (two) times daily. May substitute per insurance formulary, Disp: 1 kit, Rfl: 0    hydrOXYzine 25 MG Oral Tab, Take 1 tablet (25 mg total) by mouth nightly as needed. (Patient not taking: Reported on 12/18/2023), Disp: 60 tablet, Rfl: 0    ALLERGIES:  No Known Allergies      Review of Systems:  Constitutional:    denies fatigue, night sweats, hot flashes  Cardiovascular:   denies chest pain or palpitations  Respiratory:    denies shortness of breath  Gastrointestinal:   denies heartburn, abdominal pain, diarrhea or constipation  Genitourinary:    denies dysuria, incontinence, abnormal vaginal discharge, vaginal itching  Musculoskeletal:   denies back pain.  Skin/Breast:    denies any breast pain, lumps, or discharge.   Neurological:    denies headaches, extremity weakness or numbness.  Psychiatric:   denies depression or anxiety.  Endocrine:     denies excessive thirst or urination.  Heme/Lymph:    denies history of anemia, easy bruising or bleeding.      PHYSICAL EXAM:   /75   Pulse 88   Wt 162 lb 12.8 oz (73.8 kg)   LMP 01/18/2024 (Approximate)   BMI 30.76 kg/m²   Constitutional:   well developed, well nourished  Head/Face:  normocephalic  Abdomen:    soft, nontender, nondistended, no masses  Skin/Hair:   no unusual rashes or bruises  Extremities:   no edema, no cyanosis  Psychiatric:    oriented to time, place, person and situation. Appropriate mood and affect    Pelvic Exam:  External Genitalia:  normal appearance, hair distribution, and no lesions  Urethral Meatus:   normal in size, location, without lesions and prolapse  Bladder:    no fullness, masses or tenderness  Vagina:    normal  appearance without lesions, no abnormal discharge  Cervix:    normal without tenderness on motion (+) suture  Uterus:   normal in size, contour, position, mobility, without tenderness  Adnexa:   normal without masses or tenderness  Perineum:   normal  Anus:    no hemorroids   Lymph node:   no inguinal lymph nodes      Results for orders placed or performed during the hospital encounter of 01/09/24   Specimen to Pathology Tissue   Result Value Ref Range    Case Report       Surgical Pathology                                Case: [id number]                                  Authorizing Provider:  Abril Plascencia MD         Collected:           01/09/2024 12:29 PM          Ordering Location:     [facility]          Received:            01/09/2024 12:59 PM                                 Operating Room                                                               Pathologist:           Ivan Wan MD                                                             Specimen:    Endometrial curettage, 1. endometrial curettings , possible endometrial polyp              Final Diagnosis:         Endometrium; curettage:   Fragments of benign proliferative phase endometrial glands and stroma.  No evidence of hyperplasia or malignancy is identified.        Embedded Images      Clinical Information       N84.0 Endometrial Polyp.  N92.0 Menorrhagia With Regular Cycle.         Gross Description       The specimen is received in formalin labeled \"Live, endometrial curettings, possible endometrial polyp\" and consists of multiple fragments of pink-tan soft tissue measuring in aggregate 2.8 x 2.1 x 0.3 cm.  The specimen is submitted entirely in one cassette.  (jq)    Ivan Wan M.D.        Interpretation Benign         Assessment & Plan:    Annabella was seen today for follow - up.    Diagnoses and all orders for this visit:    Menorrhagia with regular cycle        Requested Prescriptions      No prescriptions requested or  ordered in this encounter       Reassured pt on path report.  Pelvic rest x 1 more week  Consider Mirena IUD in office if heavy periods return  F/u annual end of Sept      Spent total time 20 minutes on obtaining history / chart review, evaluating patient / performing medically appropriate exam, discussing treatment options, counseling / educating, and completing documentation, coordinating care.

## 2024-01-30 ENCOUNTER — PATIENT MESSAGE (OUTPATIENT)
Dept: SURGERY | Facility: CLINIC | Age: 44
End: 2024-01-30

## 2024-01-30 DIAGNOSIS — N20.0 NEPHROLITHIASIS: Primary | ICD-10-CM

## 2024-02-01 ENCOUNTER — PATIENT MESSAGE (OUTPATIENT)
Dept: SURGERY | Facility: CLINIC | Age: 44
End: 2024-02-01

## 2024-02-14 ENCOUNTER — IMMUNIZATION (OUTPATIENT)
Dept: LAB | Age: 44
End: 2024-02-14
Attending: EMERGENCY MEDICINE
Payer: COMMERCIAL

## 2024-02-14 DIAGNOSIS — Z23 NEED FOR VACCINATION: Primary | ICD-10-CM

## 2024-02-14 PROCEDURE — 90480 ADMN SARSCOV2 VAC 1/ONLY CMP: CPT

## 2024-02-28 NOTE — TELEPHONE ENCOUNTER
From: Annabella Live  To: Meredith Cochran  Sent: 2/1/2024 10:38 AM CST  Subject: Starting Potassium Citrate     Hi Dr. Cochran,  After careful consideration I'm interested in starting the Potassium Citrate. We've already gone over everything, including any questions that I had during our last visit and I've done research so if possible I would like to avoid an in office visit until my follow up as I just had surgery to remove a polyp in my uterus and had an MRI of my breast which unfortunately I have to have a follow-up in July. We are trying to catch up on all the medical bills so if I can avoid an in office visit that would be great.

## 2024-02-28 NOTE — TELEPHONE ENCOUNTER
From: Annabella Live  To: Meredith Cochran  Sent: 1/30/2024 2:57 PM CST  Subject: Starting Potassium Citrate     Hi Dr. Cochran,  After careful consideration I'm interested in starting the Potassium Citrate. We've already gone over everything, including any questions that I had during our last visit and I've done research so if possible I would like to avoid an in office visit until my follow up as I just had surgery to remove a polyp in my uterus and had an MRI of my breast which unfortunately I have to have a follow-up in July. We are trying to catch up on all the medical bills so if I can avoid an in office visit that would be great.

## 2024-03-01 RX ORDER — POTASSIUM CITRATE 10 MEQ/1
20 TABLET, EXTENDED RELEASE ORAL 2 TIMES DAILY
Qty: 120 TABLET | Refills: 2 | Status: SHIPPED | OUTPATIENT
Start: 2024-03-01 | End: 2024-05-30

## 2024-03-13 ENCOUNTER — HOSPITAL ENCOUNTER (EMERGENCY)
Facility: HOSPITAL | Age: 44
Discharge: HOME | End: 2024-03-13
Attending: EMERGENCY MEDICINE
Payer: COMMERCIAL

## 2024-03-13 VITALS
HEART RATE: 99 BPM | HEIGHT: 61 IN | WEIGHT: 160 LBS | TEMPERATURE: 97 F | DIASTOLIC BLOOD PRESSURE: 88 MMHG | SYSTOLIC BLOOD PRESSURE: 143 MMHG | RESPIRATION RATE: 20 BRPM | OXYGEN SATURATION: 100 % | BODY MASS INDEX: 30.21 KG/M2

## 2024-03-13 DIAGNOSIS — K11.20 PAROTITIS: Primary | ICD-10-CM

## 2024-03-13 PROCEDURE — 99283 EMERGENCY DEPT VISIT LOW MDM: CPT

## 2024-03-13 RX ORDER — PREDNISONE 20 MG/1
40 TABLET ORAL DAILY
Qty: 6 TABLET | Refills: 0 | Status: SHIPPED | OUTPATIENT
Start: 2024-03-13 | End: 2024-03-16

## 2024-03-13 RX ORDER — PREDNISONE 20 MG/1
60 TABLET ORAL ONCE
Status: COMPLETED | OUTPATIENT
Start: 2024-03-13 | End: 2024-03-13

## 2024-03-13 RX ORDER — CEPHALEXIN 500 MG/1
500 CAPSULE ORAL 3 TIMES DAILY
Qty: 21 CAPSULE | Refills: 0 | Status: SHIPPED | OUTPATIENT
Start: 2024-03-13 | End: 2024-03-20

## 2024-03-13 RX ORDER — CEPHALEXIN 500 MG/1
500 CAPSULE ORAL ONCE
Status: COMPLETED | OUTPATIENT
Start: 2024-03-13 | End: 2024-03-13

## 2024-03-14 NOTE — ED PROVIDER NOTES
Patient Seen in: [facility] Emergency Department    History     Chief Complaint   Patient presents with    Swelling     Stated Complaint: Swelling    HPI    Patient here with pain and swelling along r jaw line.  Symptoms began while eating dinner today.  Able to tolerate secretions.  no fever.  No difficulty swallowing.  Had sore throat yesterday had negative strep.  No stiff neck.    Past Medical History:   Diagnosis Date    Anesthesia complication     Anxiety     Asthma (HCC)     Calculus of kidney     Environmental allergies     Fatty liver 2023    Gestational diabetes (HCC)     High cholesterol 2022    History of anemia     History of migraine     Kidney stone 2022    Migraines     PONV (postoperative nausea and vomiting)     Recurrent kidney stones 2023    Visual impairment        Past Surgical History:   Procedure Laterality Date    COLONOSCOPY N/A 2023    Procedure: COLONOSCOPY;  Surgeon: Fausto Cosme MD;  Location: [facility] ENDO    CYSTOSCOPY,INSERT URETERAL STENT      OTHER SURGICAL HISTORY  2024    hysteroscopic currettage    WISDOM TEETH REMOVED      at age 18             Family History   Problem Relation Age of Onset    Bipolar Disorder Mother     Diabetes Father     Glaucoma Father     Breast Cancer Maternal Grandmother 45    Other (bone cancer) Maternal Grandmother     No Known Problems Maternal Grandfather     Diabetes Paternal Grandmother     Heart Disease Paternal Grandfather     Blindness Paternal Grandfather     Heart Disease Maternal Aunt 40    Heart Disease Maternal Aunt 40    Breast Cancer Paternal Cousin 38        twin sisters (unsure of BRCA status)    Breast Cancer Paternal Cousin 38        twin sisters (unsure of BRCA status)    No Known Problems Brother          of meningitis    Breast Cancer Maternal Aunt     Dementia Maternal Aunt     Other (Other) Maternal Aunt         eye cancer     Heart Disease Maternal Aunt     Diabetes Paternal Aunt      Ovarian Cancer Neg     Colon Cancer Neg        Social History     Socioeconomic History    Marital status:    Occupational History    Occupation:    Tobacco Use    Smoking status: Never     Passive exposure: Never    Smokeless tobacco: Never   Vaping Use    Vaping Use: Never used   Substance and Sexual Activity    Alcohol use: Not Currently    Drug use: Never    Sexual activity: Yes     Partners: Male   Other Topics Concern    Blood Transfusions No    Breast feeding No    Reaction to local anesthetic No    Pt has a pacemaker No    Pt has a defibrillator No   Social History Narrative    Live with  (Bryan)    No h/o abuse       Review of Systems    Positive for stated complaint: Swelling  Other systems are as noted in HPI.  Constitutional and vital signs reviewed.      All other systems reviewed and negative except as noted above.    PSFH elements reviewed from today and agreed except as otherwise stated in HPI.    Physical Exam     ED Triage Vitals [03/13/24 2044]   /88   Pulse 99   Resp 20   Temp 97.3 °F (36.3 °C)   Temp src Temporal   SpO2 100 %   O2 Device None (Room air)       Current:/88   Pulse 99   Temp 97.3 °F (36.3 °C) (Temporal)   Resp 20   Ht 154.9 cm (5' 1\")   Wt 72.6 kg   LMP 03/05/2024 (Approximate)   SpO2 100%   BMI 30.23 kg/m²       GENERAL: awake alert no ditress  HEAD: normocephalic, atraumatic  EYES: sclera non icteric bilateral, conjunctiva clear    Mouth- mmm, no swelling or drainage from salivary ducts, no sig sublingual inflammation  THROAT: post pharynx injected, uvula midline, no pointing, no stridor  LUNGS:  no resp distress, lungs clear bilateral  SKIN: good skin turgor, no obvious rashes  NECK: supple, fullness/tenderness along r submandibular gland, with  surrounding lymphadenopathy  CARDIO: Regular without murmur  EXTREMITIES: no cyanosis, clubbing or edema  GI: soft, non-tender, normal bowel sounds    DDX: parotitis vs. Other salivary gland  infection vs. Obstruction with stone vs. adenitis    ED Course   Labs Reviewed - No data to display    MDM       Medical Decision Making  Problems Addressed:  Parotitis: acute illness or injury    Amount and/or Complexity of Data Reviewed  Discussion of management or test interpretation with external provider(s): Tylenol, motrin recommended.      Risk  OTC drugs.  Prescription drug management.        Disposition and Plan     Clinical Impression:  1. Parotitis        Disposition:  Discharge    Follow-up:  Rita Boggs DO  [address] [address]  [phone]    Follow up        Medications Prescribed:  Discharge Medication List as of 3/13/2024 10:34 PM        START taking these medications    Details   predniSONE 20 MG Oral Tab Take 2 tablets (40 mg total) by mouth daily for 3 days., Normal, Disp-6 tablet, R-0      cephalexin 500 MG Oral Cap Take 1 capsule (500 mg total) by mouth 3 (three) times daily for 7 days., Normal, Disp-21 capsule, R-0

## 2024-03-14 NOTE — ED INITIAL ASSESSMENT (HPI)
Pt presents to ed with c/o right sided jaw swelling. Pt states she was eating dinner when she \"felt like her ear was clogged and noticed swelling on her right side of the jaw.\" Denies TONIA or SOB. Pt appears to be in no distress, denies pain at the sight.     Pt states she was seen at  for sore throat yesterday and strep was negative.     No meds PTA

## 2024-03-19 ENCOUNTER — HOSPITAL ENCOUNTER (OUTPATIENT)
Age: 44
Discharge: HOME | End: 2024-03-19
Payer: COMMERCIAL

## 2024-03-19 ENCOUNTER — LAB ENCOUNTER (OUTPATIENT)
Dept: LAB | Facility: HOSPITAL | Age: 44
End: 2024-03-19
Attending: PHYSICIAN ASSISTANT
Payer: COMMERCIAL

## 2024-03-19 VITALS
HEART RATE: 94 BPM | OXYGEN SATURATION: 100 % | SYSTOLIC BLOOD PRESSURE: 139 MMHG | DIASTOLIC BLOOD PRESSURE: 91 MMHG | RESPIRATION RATE: 20 BRPM | TEMPERATURE: 98 F

## 2024-03-19 DIAGNOSIS — R79.0 LOW FERRITIN: ICD-10-CM

## 2024-03-19 DIAGNOSIS — K14.0 GLOSSITIS: Primary | ICD-10-CM

## 2024-03-19 DIAGNOSIS — H65.192 ACUTE MEE (MIDDLE EAR EFFUSION), LEFT: ICD-10-CM

## 2024-03-19 LAB — DEPRECATED HBV CORE AB SER IA-ACNC: 9.2 NG/ML

## 2024-03-19 PROCEDURE — 82728 ASSAY OF FERRITIN: CPT

## 2024-03-19 PROCEDURE — 36415 COLL VENOUS BLD VENIPUNCTURE: CPT

## 2024-03-19 PROCEDURE — 99213 OFFICE O/P EST LOW 20 MIN: CPT | Performed by: NURSE PRACTITIONER

## 2024-03-19 RX ORDER — AMOXICILLIN AND CLAVULANATE POTASSIUM 875; 125 MG/1; MG/1
1 TABLET, FILM COATED ORAL 2 TIMES DAILY
Qty: 20 TABLET | Refills: 0 | Status: SHIPPED | OUTPATIENT
Start: 2024-03-19 | End: 2024-03-29

## 2024-03-19 NOTE — ED INITIAL ASSESSMENT (HPI)
Pt c/o sore throat, L ear pain and roof of mouth pain and swelling x10 days.  States seen at [facility] 3/12/24, neg strep.  Seen at [facility] ER 3/13/24 for same symptoms and had lip swelling. States given prednisone and keflex for blocked salivary gland.  No fever.

## 2024-03-20 ENCOUNTER — OFFICE VISIT (OUTPATIENT)
Dept: DERMATOLOGY CLINIC | Facility: CLINIC | Age: 44
End: 2024-03-20
Payer: COMMERCIAL

## 2024-03-20 ENCOUNTER — LAB ENCOUNTER (OUTPATIENT)
Dept: LAB | Age: 44
End: 2024-03-20
Attending: STUDENT IN AN ORGANIZED HEALTH CARE EDUCATION/TRAINING PROGRAM
Payer: COMMERCIAL

## 2024-03-20 DIAGNOSIS — L65.0 TELOGEN EFFLUVIUM: Primary | ICD-10-CM

## 2024-03-20 DIAGNOSIS — L65.0 TELOGEN EFFLUVIUM: ICD-10-CM

## 2024-03-20 LAB
ALBUMIN SERPL-MCNC: 4.3 G/DL (ref 3.2–4.8)
ALBUMIN/GLOB SERPL: 1.7 {RATIO} (ref 1–2)
ALP LIVER SERPL-CCNC: 75 U/L
ALT SERPL-CCNC: 20 U/L
ANION GAP SERPL CALC-SCNC: 5 MMOL/L (ref 0–18)
AST SERPL-CCNC: 17 U/L (ref ?–34)
BASOPHILS # BLD AUTO: 0.06 X10(3) UL (ref 0–0.2)
BASOPHILS NFR BLD AUTO: 0.7 %
BILIRUB SERPL-MCNC: 0.3 MG/DL (ref 0.3–1.2)
BUN BLD-MCNC: 15 MG/DL (ref 9–23)
BUN/CREAT SERPL: 20.3 (ref 10–20)
CALCIUM BLD-MCNC: 9.8 MG/DL (ref 8.7–10.4)
CHLORIDE SERPL-SCNC: 107 MMOL/L (ref 98–112)
CO2 SERPL-SCNC: 28 MMOL/L (ref 21–32)
CREAT BLD-MCNC: 0.74 MG/DL
DEPRECATED RDW RBC AUTO: 47.7 FL (ref 35.1–46.3)
DHEA-S SERPL-MCNC: 80.5 UG/DL
EGFRCR SERPLBLD CKD-EPI 2021: 104 ML/MIN/1.73M2 (ref 60–?)
EOSINOPHIL # BLD AUTO: 0.16 X10(3) UL (ref 0–0.7)
EOSINOPHIL NFR BLD AUTO: 2 %
ERYTHROCYTE [DISTWIDTH] IN BLOOD BY AUTOMATED COUNT: 14.5 % (ref 11–15)
FASTING STATUS PATIENT QL REPORTED: NO
GLOBULIN PLAS-MCNC: 2.6 G/DL (ref 2.8–4.4)
GLUCOSE BLD-MCNC: 95 MG/DL (ref 70–99)
HCT VFR BLD AUTO: 43.7 %
HGB BLD-MCNC: 13.6 G/DL
IMM GRANULOCYTES # BLD AUTO: 0.05 X10(3) UL (ref 0–1)
IMM GRANULOCYTES NFR BLD: 0.6 %
IRON SATN MFR SERPL: 20 %
IRON SERPL-MCNC: 80 UG/DL
LYMPHOCYTES # BLD AUTO: 2.3 X10(3) UL (ref 1–4)
LYMPHOCYTES NFR BLD AUTO: 28.2 %
MCH RBC QN AUTO: 28 PG (ref 26–34)
MCHC RBC AUTO-ENTMCNC: 31.1 G/DL (ref 31–37)
MCV RBC AUTO: 90.1 FL
MONOCYTES # BLD AUTO: 0.48 X10(3) UL (ref 0.1–1)
MONOCYTES NFR BLD AUTO: 5.9 %
NEUTROPHILS # BLD AUTO: 5.11 X10 (3) UL (ref 1.5–7.7)
NEUTROPHILS # BLD AUTO: 5.11 X10(3) UL (ref 1.5–7.7)
NEUTROPHILS NFR BLD AUTO: 62.6 %
OSMOLALITY SERPL CALC.SUM OF ELEC: 291 MOSM/KG (ref 275–295)
PLATELET # BLD AUTO: 314 10(3)UL (ref 150–450)
POTASSIUM SERPL-SCNC: 4.4 MMOL/L (ref 3.5–5.1)
PROT SERPL-MCNC: 6.9 G/DL (ref 5.7–8.2)
RBC # BLD AUTO: 4.85 X10(6)UL
SODIUM SERPL-SCNC: 140 MMOL/L (ref 136–145)
TIBC SERPL-MCNC: 410 UG/DL (ref 250–425)
TRANSFERRIN SERPL-MCNC: 275 MG/DL (ref 250–380)
TSI SER-ACNC: 0.99 MIU/ML (ref 0.55–4.78)
VIT D+METAB SERPL-MCNC: 17.1 NG/ML (ref 30–100)
WBC # BLD AUTO: 8.2 X10(3) UL (ref 4–11)

## 2024-03-20 PROCEDURE — 85025 COMPLETE CBC W/AUTO DIFF WBC: CPT

## 2024-03-20 PROCEDURE — 82306 VITAMIN D 25 HYDROXY: CPT

## 2024-03-20 PROCEDURE — 84466 ASSAY OF TRANSFERRIN: CPT

## 2024-03-20 PROCEDURE — 84410 TESTOSTERONE BIOAVAILABLE: CPT

## 2024-03-20 PROCEDURE — 99203 OFFICE O/P NEW LOW 30 MIN: CPT | Performed by: STUDENT IN AN ORGANIZED HEALTH CARE EDUCATION/TRAINING PROGRAM

## 2024-03-20 PROCEDURE — 84630 ASSAY OF ZINC: CPT

## 2024-03-20 PROCEDURE — 36415 COLL VENOUS BLD VENIPUNCTURE: CPT

## 2024-03-20 PROCEDURE — 80053 COMPREHEN METABOLIC PANEL: CPT

## 2024-03-20 PROCEDURE — 83540 ASSAY OF IRON: CPT

## 2024-03-20 PROCEDURE — 82627 DEHYDROEPIANDROSTERONE: CPT

## 2024-03-20 PROCEDURE — 84443 ASSAY THYROID STIM HORMONE: CPT

## 2024-03-20 NOTE — PROGRESS NOTES
March 20, 2024    Established patient     CHIEF COMPLAINT: Alopecia f/u    HISTORY OF PRESENT ILLNESS: .    1. Hair loss  Location: Scalp   Duration: 3-4 months   Signs and symptoms: Hair loss, hair loss patches   Current treatment: clobetasol 0.05 % Solution     DERM HISTORY:  History of skin cancer: No  History of chronic skin disease/condition: No    FAMILY HISTORY:  History of melanoma: No  History of chronic skin disease/condition: No    History/Other:    REVIEW OF SYSTEMS:  Constitutional: Denies fever, chills, unintentional weight loss.   Skin as per HPI    PAST MEDICAL HISTORY:  Past Medical History:   Diagnosis Date    Anesthesia complication     Anxiety     Asthma (HCC)     Calculus of kidney     Environmental allergies     Fatty liver 04/03/2023    Gestational diabetes (HCC)     High cholesterol 11/17/2022    History of anemia     History of migraine     Kidney stone 11/25/2022    Migraines     PONV (postoperative nausea and vomiting)     Recurrent kidney stones 04/03/2023    Visual impairment        Medications  Current Outpatient Medications   Medication Sig Dispense Refill    amoxicillin clavulanate 875-125 MG Oral Tab Take 1 tablet by mouth 2 (two) times daily for 10 days. 20 tablet 0    Benadryl/Maalox/Lidocaine 1:1:1 solution Take 5-10 mL by mouth TID AC&HS. Swish and Smallow or Swish and Spit 480 mL 0    cephalexin 500 MG Oral Cap Take 1 capsule (500 mg total) by mouth 3 (three) times daily for 7 days. 21 capsule 0    potassium citrate 10 MEQ (1080 MG) Oral Tab CR Take 2 tablets (20 mEq total) by mouth in the morning and 2 tablets (20 mEq total) before bedtime. 120 tablet 2    BIOTIN OR Take 1 capsule by mouth daily.      buPROPion  MG Oral Tablet 24 Hr Take 1 tablet (150 mg total) by mouth daily. 90 tablet 0    Phentermine HCl 37.5 MG Oral Tab Take 1 tablet (37.5 mg total) by mouth every morning before breakfast. 30 tablet 2    clobetasol 0.05 % External Solution Apply 1 mL topically daily  as needed. 60 mL 3    SUMAtriptan 25 MG Oral Tab Take 1 tablet (25 mg total) by mouth every 2 (two) hours as needed for Migraine (max 2 doses per day). 9 tablet 0    acidophilus-pectin Oral Cap Take 1 capsule by mouth daily. (Patient not taking: Reported on 3/19/2024)      hydrOXYzine 25 MG Oral Tab Take 1 tablet (25 mg total) by mouth nightly as needed. (Patient not taking: Reported on 12/18/2023) 60 tablet 0    Budesonide (PULMICORT FLEXHALER) 90 MCG/ACT Inhalation Aerosol Powder, Breath Activated Inhale 1 puff into the lungs 2 (two) times daily as needed. 2 each 2    montelukast 10 MG Oral Tab Take 1 tablet (10 mg total) by mouth nightly. 90 tablet 1    albuterol 108 (90 Base) MCG/ACT Inhalation Aero Soln Inhale 2 puffs into the lungs every 6 (six) hours as needed for Wheezing or Shortness of Breath. 1 each 0    prenatal vitamin w/DHA 27-0.8-228 MG Oral Cap Take 1 capsule by mouth daily.      Blood Glucose Monitoring Suppl (ONETOUCH VERIO FLEX SYSTEM) w/Device Does not apply Kit 1 kit by Does not apply route 2 (two) times daily. May substitute per insurance formulary 1 kit 0       Objective:    PHYSICAL EXAM:  General: awake, alert, no acute distress  Skin: Skin exam was performed today including the following: scalp. Pertinent findings include:   - with generalized thinning     ASSESSMENT & PLAN:  Pathophysiology of diagnoses discussed with patient.  Therapeutic options reviewed. Risks, benefits, and alternatives discussed with patient. Instructions reviewed at length.    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  Discussed that hair shedding should decrease in the next 3 months in regards to recur the next 6 to 9 months.  Discussed with patient there are other treatment options including oral and topical minoxidil.  At this time patient desires to wait and see if her regrowth occurs naturally.   Will plan to check labs today including thyroid, iron, CBC, CMP, vitamin D and zinc.   - Recommended slow FE given low iron,vitamin D 2K-5K international international units  daily and MVI    Return to clinic: 6 months or sooner if something concerning arises     Vik Uribe MD

## 2024-03-20 NOTE — ED PROVIDER NOTES
Patient Seen in: [facility]      History     Chief Complaint   Patient presents with    Sore Throat     Stated Complaint: sore throat    Subjective:   HPI    44 yr old female here for evaluation of continued left sided neck soreness and tenderness, left ear pain, tongue soreness and intermittent swelling x 10 days. She was seen at another Urgent care on 3/12 with similar complaints and told strep was negative. She went to ER the next day, stating she had right sided cheek swelling and sent home on keflex and prednisone for a blocked salivary gland. She reports finishing prednisone and on last day of keflex. She denies fever or chills at any time, denies difficulty swallowing or breathing, shortness of breath, tinnitus, dizziness or headache. She states she feels pain and fullness in left ear but not right ear. The symptoms on her left side never went away despite the antibiotics given for blocked salivary gland. She is worried about being home with her 2 year old and it getting worse. She denies drooling, stiffness in her neck, trouble talking or change in voice, difficulty opening mouth, pain or swelling to the floor of her mouth, chest pain, palpitations.    Objective:   Past Medical History:   Diagnosis Date    Anesthesia complication     Anxiety     Asthma (HCC)     Calculus of kidney     Environmental allergies     Fatty liver 04/03/2023    Gestational diabetes (HCC)     High cholesterol 11/17/2022    History of anemia     History of migraine     Kidney stone 11/25/2022    Migraines     PONV (postoperative nausea and vomiting)     Recurrent kidney stones 04/03/2023    Visual impairment               Past Surgical History:   Procedure Laterality Date    COLONOSCOPY N/A 03/08/2023    Procedure: COLONOSCOPY;  Surgeon: Fausto Cosme MD;  Location: [facility] ENDO    CYSTOSCOPY,INSERT URETERAL STENT      OTHER SURGICAL HISTORY  01/09/2024    hysteroscopic currettage    WISDOM TEETH REMOVED      at age 18                  Social History     Socioeconomic History    Marital status:    Occupational History    Occupation:    Tobacco Use    Smoking status: Never     Passive exposure: Never    Smokeless tobacco: Never   Vaping Use    Vaping Use: Never used   Substance and Sexual Activity    Alcohol use: Not Currently    Drug use: Never    Sexual activity: Yes     Partners: Male   Other Topics Concern    Blood Transfusions No    Breast feeding No    Reaction to local anesthetic No    Pt has a pacemaker No    Pt has a defibrillator No   Social History Narrative    Live with  (Bryan)    No h/o abuse              Review of Systems    Positive for stated complaint: sore throat  Other systems are as noted in HPI.  Constitutional and vital signs reviewed.      All other systems reviewed and negative except as noted above.    Physical Exam     ED Triage Vitals [03/19/24 1844]   BP (!) 139/91   Pulse 94   Resp 20   Temp 98.3 °F (36.8 °C)   Temp src Temporal   SpO2 100 %   O2 Device None (Room air)       Current:BP (!) 139/91   Pulse 94   Temp 98.3 °F (36.8 °C) (Temporal)   Resp 20   LMP 03/05/2024 (Approximate)   SpO2 100%         Physical Exam  Vitals and nursing note reviewed.   Constitutional:       General: She is not in acute distress.     Appearance: She is well-developed. She is not ill-appearing.   HENT:      Head: Normocephalic and atraumatic. No raccoon eyes, Christine's sign, right periorbital erythema or left periorbital erythema.      Jaw: No trismus, tenderness, swelling, pain on movement or malocclusion.      Salivary Glands: Right salivary gland is not diffusely enlarged or tender. Left salivary gland is not diffusely enlarged or tender.      Right Ear: No drainage, swelling or tenderness. No middle ear effusion. There is no impacted cerumen. No mastoid tenderness. Tympanic membrane is not injected, perforated, erythematous or bulging.      Left Ear: No drainage, swelling or tenderness. A  middle ear effusion is present. There is no impacted cerumen. No mastoid tenderness. No hemotympanum. Tympanic membrane is injected. Tympanic membrane is not perforated, erythematous or bulging.      Nose: No congestion or rhinorrhea.      Right Sinus: No maxillary sinus tenderness or frontal sinus tenderness.      Left Sinus: No maxillary sinus tenderness or frontal sinus tenderness.      Mouth/Throat:      Lips: Pink.      Mouth: Mucous membranes are moist. No oral lesions or angioedema.      Dentition: No dental tenderness, gingival swelling or dental abscesses.      Tongue: Lesions (left posterior side of tongue with redness and canker sore noted, raised taste buds to posterior dorsal tongue) present. Tongue does not deviate from midline.      Palate: No mass and lesions.      Pharynx: Oropharynx is clear. Uvula midline. No pharyngeal swelling, oropharyngeal exudate, posterior oropharyngeal erythema or uvula swelling.      Tonsils: No tonsillar exudate or tonsillar abscesses.   Eyes:      Pupils: Pupils are equal, round, and reactive to light.   Cardiovascular:      Rate and Rhythm: Normal rate.      Heart sounds: Normal heart sounds.   Pulmonary:      Effort: Pulmonary effort is normal. No respiratory distress.      Breath sounds: Normal breath sounds. No stridor. No wheezing, rhonchi or rales.   Musculoskeletal:      Cervical back: Normal range of motion and neck supple.   Lymphadenopathy:      Head:      Right side of head: No submental, submandibular, preauricular or posterior auricular adenopathy.      Left side of head: No submental, submandibular, preauricular or posterior auricular adenopathy.      Cervical: Cervical adenopathy present.      Right cervical: No superficial, deep or posterior cervical adenopathy.     Left cervical: Superficial cervical adenopathy, deep cervical adenopathy and posterior cervical adenopathy present.   Skin:     General: Skin is warm.   Neurological:      Mental Status: She  is alert and oriented to person, place, and time.   Psychiatric:         Mood and Affect: Mood normal.         Behavior: Behavior normal.               ED Course   Labs Reviewed - No data to display                   MDM     44-year-old female here for evaluation of continued left-sided neck soreness and tenderness, left ear pain, tongue soreness and intermittent swelling x 10 days    On exam patient well-appearing, talking in full sentences in no respiratory distress.  100% on room air, no tachycardia or fever noted.  There is no muffled voice or dysphagia noted.  Patient has full range of motion to jaw with nontender TMJ, mandibular edge or submandibular space.  Airways patent, there is no erythema or swelling to pharynx.  Tongue is moist.  Noted redness and canker sore to left lateral side of tongue.  Noted inflamed taste buds to posterior dorsum of tongue.  No obvious dental abscess and gumline, vestibule of either side of buccal mucosa.  Nontender dentition.  The base of her tongue is normal in color, there is no swelling erythema or obvious lesions.  Left-sided cervical lymph node tenderness and swelling noted.  Right side is normal.  Patient has nontender preauricular and postauricular areas.  Right TM and canal normal.  Left TM with injection and effusion, normal canal. Full ROM to neck.  Lungs clear no wheezing stridor or crackles.     Diff dx: otitis media, parotitis, sheldon's angina, mass or lesion  Pt does not have muffled voice, diffuse submandibular swelling and fullness, fever. Swelling to base of tongue, like ludwigs> unlikely    PT was treated with keflex for parotitis and feels like left side ear and neck has continued. Discussed otitis media may be causing this pain or a deeper infection process.   Discussed canker sores, tongue inflammation can occur with any viral/bacterial process of head and neck as this is immune response.  I do not see any  tongue or pharyngeal swelling, there is no airway  compromise at this time.     Will place her on Augmentin for ear infection process, that could also help if still having some parotitis related to mouth issues. Discussed magic mouthwash for pain relief.    Advised on close follow up with dentist and ENT for continued evaluation if symptoms persist. Discussed red flag symptoms and reasons to go to ER for imaging.    PT stable, nontoxic for dc home  All questions answered. Return and ER precautions given.    Counseled: Patient, regarding diagnosis, regarding treatment plan, regarding diagnostic results, regarding prescription, I have discussed with the patient the results of tests, differential diagnosis, and warning signs and symptoms that should prompt immediate return. The patient understands these instructions and agrees to the follow-up plan provided. There is no barriers to learning. Appropriate f/u given. Patient agrees to return for any concerns/ problems/complications.                                   Medical Decision Making      Disposition and Plan     Clinical Impression:  1. Glossitis    2. Acute PHIL (middle ear effusion), left         Disposition:  Discharge  3/19/2024  7:30 pm    Follow-up:  Zain Lin MD  [address] [place] [address] [id number] [phone]    Schedule an appointment as soon as possible for a visit in 1 week  If symptoms worsen    Rita Boggs DO  [address] [id number] [phone]    Schedule an appointment as soon as possible for a visit   As needed          Medications Prescribed:  Discharge Medication List as of 3/19/2024  7:31 PM        START taking these medications    Details   amoxicillin clavulanate 875-125 MG Oral Tab Take 1 tablet by mouth 2 (two) times daily for 10 days., Normal, Disp-20 tablet, R-0      Benadryl/Maalox/Lidocaine 1:1:1 solution Take 5-10 mL by mouth TID AC&HS. Swish and Smallow or Swish and Spit, Normal, Disp-480 mL, R-0

## 2024-03-20 NOTE — DISCHARGE INSTRUCTIONS
Follow up with Ear nose throat specialist for continued evaluation.    See your dentist as well as they will do a thorough exam of your tongue to determine if there are any disorders causing these symptoms.    Take Augmentin two times a day for 10 days. Finish full course.  Use magic mouthwash as needed to help with tongue and mouth discomfort.     Avoid spicy greasy or acidic foods as this can increase irritation and inflammation.    Salt water gargles after food intake.    GO TO ED For worsening pain, difficulty swallowing, breathing. Dizziness, ringing in your ear. Worsening ear or behind the ear pain. Fevers > 101.

## 2024-03-23 LAB
SEX HORM BIND GLOB: 29.5 NMOL/L
TESTOST % FREE+WEAK BND: 15.6 %
TESTOST FREE+WEAK BND: 2.5 NG/DL
TESTOSTERONE TOT /MS: 16.1 NG/DL
ZINC: 87 UG/DL

## 2024-03-25 ENCOUNTER — TELEPHONE (OUTPATIENT)
Dept: DERMATOLOGY CLINIC | Facility: CLINIC | Age: 44
End: 2024-03-25

## 2024-03-25 RX ORDER — ERGOCALCIFEROL 1.25 MG/1
50000 CAPSULE ORAL WEEKLY
Qty: 12 CAPSULE | Refills: 0 | Status: SHIPPED | OUTPATIENT
Start: 2024-03-25 | End: 2024-06-11

## 2024-03-25 NOTE — TELEPHONE ENCOUNTER
Dr. Uribe,    Pt is asking to clarify what kind of dose they need to  OTC?     Chart notes say:   Recommended slow FE given low iron     Thank You!

## 2024-03-26 ENCOUNTER — OFFICE VISIT (OUTPATIENT)
Dept: OTOLARYNGOLOGY | Facility: CLINIC | Age: 44
End: 2024-03-26

## 2024-03-26 DIAGNOSIS — H92.02 LEFT EAR PAIN: ICD-10-CM

## 2024-03-26 DIAGNOSIS — K11.20 SIALADENITIS: ICD-10-CM

## 2024-03-26 DIAGNOSIS — K14.6 TONGUE BURNING SENSATION: Primary | ICD-10-CM

## 2024-03-26 PROCEDURE — 99243 OFF/OP CNSLTJ NEW/EST LOW 30: CPT | Performed by: STUDENT IN AN ORGANIZED HEALTH CARE EDUCATION/TRAINING PROGRAM

## 2024-03-26 PROCEDURE — 92504 EAR MICROSCOPY EXAMINATION: CPT | Performed by: STUDENT IN AN ORGANIZED HEALTH CARE EDUCATION/TRAINING PROGRAM

## 2024-03-26 NOTE — PROGRESS NOTES
Annabella Live is a 44 year old female.   Chief Complaint   Patient presents with    Ear Problem     C/o left fluid in ear and swelling     Mouth/Lip Problem     Tongue swelling along with sore throat   Pain in left side of face        ASSESSMENT AND PLAN:   1. Tongue burning sensation  44-year-old presents with several symptoms including lateral tongue burning, left ear pain and a reported cell tinnitus.  She had a right cheek swelling that she states responded to the oral antibiotics.  She is currently dealing with anemia.  She denies any hearing issues.    On exam she had a normal ear exam there is no effusion or otitis.  There were no signs of sialadenitis today.  Her tongue exam appeared to be normal there were no signs of thrush or mucosal irregularities.    Discussed with her her otalgia could be related to a temporomandibular joint process given the normal ear exam and the tenderness of her jaw joint.  Discussed trialing ibuprofen for this prior to sleep.  Did not see any irregularities of her tongue she is working up anemia there could be a link between anemia and her burning tongue.  If this is still present after correcting the anemia she can return for this.  If the sialadenitis returns we will consider a CT scan of her neck to look for a salivary stone.  Currently no active solid tinnitus. Consult from Dr. Gordon regarding tongue evaluation    2. Left ear pain      3. Sialadenitis        The patient indicates understanding of these issues and agrees to the plan.      EXAM:   [facility] 03/05/2024 (Approximate)     Pertinent exam findings may also be noted above in assessment and plan     System Details   Skin Inspection - Normal.   Constitutional Overall appearance - Normal.   Head/Face Symmetric, TMJ tenderness not present    Eyes EOMI, PERRL   Right ear:  Canal clear, TM intact, no PHIL   Left ear:  Canal clear, TM intact, no PHIL   Nose: Septum midline, inferior turbinates not enlarged, nasal valves  without collapse    Oral cavity/Oropharynx: No lesions or masses on inspection or palpation, tonsils symmetric    Neck: Soft without LAD, thyroid not enlarged  Voice clear/ no stridor   Other:      Scopes and Procedures:             Current Outpatient Medications   Medication Sig Dispense Refill    ergocalciferol 1.25 MG (82868 UT) Oral Cap Take 1 capsule (50,000 Units total) by mouth once a week for 12 doses. 12 capsule 0    amoxicillin clavulanate 875-125 MG Oral Tab Take 1 tablet by mouth 2 (two) times daily for 10 days. 20 tablet 0    potassium citrate 10 MEQ (1080 MG) Oral Tab CR Take 2 tablets (20 mEq total) by mouth in the morning and 2 tablets (20 mEq total) before bedtime. 120 tablet 2    BIOTIN OR Take 1 capsule by mouth daily.      buPROPion  MG Oral Tablet 24 Hr Take 1 tablet (150 mg total) by mouth daily. 90 tablet 0    Phentermine HCl 37.5 MG Oral Tab Take 1 tablet (37.5 mg total) by mouth every morning before breakfast. 30 tablet 2    clobetasol 0.05 % External Solution Apply 1 mL topically daily as needed. 60 mL 3    SUMAtriptan 25 MG Oral Tab Take 1 tablet (25 mg total) by mouth every 2 (two) hours as needed for Migraine (max 2 doses per day). 9 tablet 0    Budesonide (PULMICORT FLEXHALER) 90 MCG/ACT Inhalation Aerosol Powder, Breath Activated Inhale 1 puff into the lungs 2 (two) times daily as needed. 2 each 2    montelukast 10 MG Oral Tab Take 1 tablet (10 mg total) by mouth nightly. 90 tablet 1    albuterol 108 (90 Base) MCG/ACT Inhalation Aero Soln Inhale 2 puffs into the lungs every 6 (six) hours as needed for Wheezing or Shortness of Breath. 1 each 0    prenatal vitamin w/DHA 27-0.8-228 MG Oral Cap Take 1 capsule by mouth daily.      Blood Glucose Monitoring Suppl (ONETOUCH VERIO FLEX SYSTEM) w/Device Does not apply Kit 1 kit by Does not apply route 2 (two) times daily. May substitute per insurance formulary 1 kit 0    Benadryl/Maalox/Lidocaine 1:1:1 solution Take 5-10 mL by mouth TID  AC&HS. Swish and Smallow or Swish and Spit 480 mL 0    acidophilus-pectin Oral Cap Take 1 capsule by mouth daily. (Patient not taking: Reported on 3/19/2024)      hydrOXYzine 25 MG Oral Tab Take 1 tablet (25 mg total) by mouth nightly as needed. (Patient not taking: Reported on 12/18/2023) 60 tablet 0      Past Medical History:   Diagnosis Date    Acne     Allergic rhinitis     Anesthesia complication     Anxiety     Asthma (HCC)     Calculus of kidney     Environmental allergies     Fatty liver 04/03/2023    Gestational diabetes (HCC)     High cholesterol 11/17/2022    History of anemia     History of migraine     Hyperlipidemia     Kidney stone 11/25/2022    Migraines     Obesity     PONV (postoperative nausea and vomiting)     Recurrent kidney stones 04/03/2023    Visual impairment       Social History:  Social History     Socioeconomic History    Marital status:    Occupational History    Occupation: [employer]   Tobacco Use    Smoking status: Never     Passive exposure: Never    Smokeless tobacco: Never   Vaping Use    Vaping Use: Never used   Substance and Sexual Activity    Alcohol use: Not Currently    Drug use: Never    Sexual activity: Yes     Partners: Male   Other Topics Concern    Blood Transfusions No    Grew up on a farm No    History of tanning No    Outdoor occupation Yes    Breast feeding No    Reaction to local anesthetic No    Pt has a pacemaker No    Pt has a defibrillator No   Social History Narrative    Live with  (Bryan)    No h/o abuse          Zain Lin MD  3/26/2024  2:36 PM

## 2024-03-29 ENCOUNTER — LAB ENCOUNTER (OUTPATIENT)
Dept: LAB | Facility: HOSPITAL | Age: 44
End: 2024-03-29
Attending: UROLOGY
Payer: COMMERCIAL

## 2024-03-29 ENCOUNTER — OFFICE VISIT (OUTPATIENT)
Dept: SURGERY | Facility: CLINIC | Age: 44
End: 2024-03-29
Payer: COMMERCIAL

## 2024-03-29 VITALS
HEIGHT: 61.3 IN | SYSTOLIC BLOOD PRESSURE: 104 MMHG | DIASTOLIC BLOOD PRESSURE: 80 MMHG | BODY MASS INDEX: 32.62 KG/M2 | HEART RATE: 82 BPM | WEIGHT: 175 LBS | OXYGEN SATURATION: 99 %

## 2024-03-29 DIAGNOSIS — E78.5 DYSLIPIDEMIA: Primary | ICD-10-CM

## 2024-03-29 DIAGNOSIS — E66.9 OBESITY (BMI 30-39.9): ICD-10-CM

## 2024-03-29 DIAGNOSIS — K76.0 FATTY LIVER: ICD-10-CM

## 2024-03-29 DIAGNOSIS — F43.9 STRESS: ICD-10-CM

## 2024-03-29 DIAGNOSIS — N20.0 NEPHROLITHIASIS: ICD-10-CM

## 2024-03-29 DIAGNOSIS — N20.0 RECURRENT KIDNEY STONES: ICD-10-CM

## 2024-03-29 LAB
ALBUMIN SERPL-MCNC: 4.4 G/DL (ref 3.2–4.8)
ALBUMIN/GLOB SERPL: 1.8 {RATIO} (ref 1–2)
ALP LIVER SERPL-CCNC: 61 U/L
ALT SERPL-CCNC: 29 U/L
ANION GAP SERPL CALC-SCNC: 5 MMOL/L (ref 0–18)
AST SERPL-CCNC: 18 U/L (ref ?–34)
BILIRUB SERPL-MCNC: 0.5 MG/DL (ref 0.3–1.2)
BUN BLD-MCNC: 16 MG/DL (ref 9–23)
BUN/CREAT SERPL: 20.5 (ref 10–20)
CALCIUM BLD-MCNC: 9.7 MG/DL (ref 8.7–10.4)
CHLORIDE SERPL-SCNC: 106 MMOL/L (ref 98–112)
CO2 SERPL-SCNC: 29 MMOL/L (ref 21–32)
CREAT BLD-MCNC: 0.78 MG/DL
EGFRCR SERPLBLD CKD-EPI 2021: 97 ML/MIN/1.73M2 (ref 60–?)
FASTING STATUS PATIENT QL REPORTED: NO
GLOBULIN PLAS-MCNC: 2.5 G/DL (ref 2.8–4.4)
GLUCOSE BLD-MCNC: 90 MG/DL (ref 70–99)
OSMOLALITY SERPL CALC.SUM OF ELEC: 291 MOSM/KG (ref 275–295)
POTASSIUM SERPL-SCNC: 4.6 MMOL/L (ref 3.5–5.1)
PROT SERPL-MCNC: 6.9 G/DL (ref 5.7–8.2)
SODIUM SERPL-SCNC: 140 MMOL/L (ref 136–145)

## 2024-03-29 PROCEDURE — 99214 OFFICE O/P EST MOD 30 MIN: CPT | Performed by: NURSE PRACTITIONER

## 2024-03-29 PROCEDURE — 3008F BODY MASS INDEX DOCD: CPT | Performed by: NURSE PRACTITIONER

## 2024-03-29 PROCEDURE — 80053 COMPREHEN METABOLIC PANEL: CPT

## 2024-03-29 PROCEDURE — 3074F SYST BP LT 130 MM HG: CPT | Performed by: NURSE PRACTITIONER

## 2024-03-29 PROCEDURE — 3079F DIAST BP 80-89 MM HG: CPT | Performed by: NURSE PRACTITIONER

## 2024-03-29 PROCEDURE — 36415 COLL VENOUS BLD VENIPUNCTURE: CPT

## 2024-03-29 RX ORDER — PHENTERMINE HYDROCHLORIDE 37.5 MG/1
37.5 TABLET ORAL
Qty: 30 TABLET | Refills: 3 | Status: SHIPPED | OUTPATIENT
Start: 2024-03-29

## 2024-03-29 RX ORDER — BUPROPION HYDROCHLORIDE 150 MG/1
150 TABLET ORAL DAILY
Qty: 90 TABLET | Refills: 0 | Status: SHIPPED | OUTPATIENT
Start: 2024-03-29

## 2024-03-29 NOTE — PROGRESS NOTES
[facility], [facility], [place]  [address] [facility] [address] [place] [id number]  Dept: [phone]       Patient:  Annabella Live  :      3/3/1980  MRN:      [id number]    Chief Complaint:    Chief Complaint   Patient presents with    Follow - Up    Weight Management    Obesity       SUBJECTIVE     History of Present Illness:  Annabella is being seen today for a follow-up for weight management.      lbs since visit.   S/p uterine polypectomy.     Previously s/p two surgeries for kidney stones since last visit.     Stopped weight loss medication due to hair loss. Follows with dermatology.   Plans to resume phentermine.     Initial HPI:  42 yo female.   Desires assistance with weight loss.      Hx gestational diabetes- son is now 1.5 years old.  Hx recent renal stones.   Works with GI- dx fatty liver disease.     Recent antibiotic use.     Patient is considering medications and is not a candidate for bariatric surgery for weight loss.    Patient denies any history of eating disorder(s).    Patient is employed: evening- nanny.   Patient lives with , son.    Patient's goal weight: 130-150 lbs  Biggest weight loss in the past: 20 lbs  How weight loss was achieved: exercise, improved nutrition   Heaviest weight ever: 185 lbs  Previous use of medical weight loss medications:    Physical activity: None   Walks to work in good weather (with neighbor)    Sleep: inadequate hours/night    Sleep screening:   Past Medical History:   Past Medical History:   Diagnosis Date    Acne     Allergic rhinitis     Anesthesia complication     Anxiety     Asthma (HCC)     Calculus of kidney     Environmental allergies     Fatty liver 2023    Gestational diabetes (HCC)     High cholesterol 2022    History of anemia     History of migraine     Hyperlipidemia     Kidney stone 2022    Migraines     Obesity     PONV (postoperative nausea and vomiting)     Recurrent  kidney stones 04/03/2023    Visual impairment         Comorbidities:  Hyperlipidemia-Improvement?  no    OBJECTIVE     Vitals: /80 (BP Location: Right arm, Patient Position: Sitting, Cuff Size: adult)   Pulse 82   Ht 5' 1.3\" (1.557 m)   Wt 175 lb (79.4 kg)   LMP 03/05/2024 (Approximate)   SpO2 99%   BMI 32.74 kg/m²     Initial weight loss: +03   Total weight loss: +09   Start weight: 166    Wt Readings from Last 6 Encounters:   03/29/24 175 lb (79.4 kg)   03/13/24 160 lb (72.6 kg)   01/23/24 162 lb 12.8 oz (73.8 kg)   01/09/24 159 lb (72.1 kg)   12/18/23 155 lb 12.8 oz (70.7 kg)   11/06/23 161 lb (73 kg)       Patient Medications:    Current Outpatient Medications   Medication Sig Dispense Refill    buPROPion  MG Oral Tablet 24 Hr Take 1 tablet (150 mg total) by mouth daily. 90 tablet 0    Phentermine HCl 37.5 MG Oral Tab Take 1 tablet (37.5 mg total) by mouth every morning before breakfast. 30 tablet 3    ergocalciferol 1.25 MG (32743 UT) Oral Cap Take 1 capsule (50,000 Units total) by mouth once a week for 12 doses. 12 capsule 0    potassium citrate 10 MEQ (1080 MG) Oral Tab CR Take 2 tablets (20 mEq total) by mouth in the morning and 2 tablets (20 mEq total) before bedtime. 120 tablet 2    BIOTIN OR Take 1 capsule by mouth daily.      clobetasol 0.05 % External Solution Apply 1 mL topically daily as needed. 60 mL 3    SUMAtriptan 25 MG Oral Tab Take 1 tablet (25 mg total) by mouth every 2 (two) hours as needed for Migraine (max 2 doses per day). 9 tablet 0    acidophilus-pectin Oral Cap Take 1 capsule by mouth daily. (Patient not taking: Reported on 3/19/2024)      Budesonide (PULMICORT FLEXHALER) 90 MCG/ACT Inhalation Aerosol Powder, Breath Activated Inhale 1 puff into the lungs 2 (two) times daily as needed. 2 each 2    montelukast 10 MG Oral Tab Take 1 tablet (10 mg total) by mouth nightly. 90 tablet 1    albuterol 108 (90 Base) MCG/ACT Inhalation Aero Soln Inhale 2 puffs into the lungs every  6 (six) hours as needed for Wheezing or Shortness of Breath. 1 each 0    prenatal vitamin w/DHA 27-0.8-228 MG Oral Cap Take 1 capsule by mouth daily.      Blood Glucose Monitoring Suppl (ONETOUCH VERIO FLEX SYSTEM) w/Device Does not apply Kit 1 kit by Does not apply route 2 (two) times daily. May substitute per insurance formulary 1 kit 0     Allergies:  Patient has no known allergies.     Social History:  Reviewed    Surgical History:    Past Surgical History:   Procedure Laterality Date    COLONOSCOPY N/A 2023    Procedure: COLONOSCOPY;  Surgeon: Fausto Cosme MD;  Location: [facility] ENDO    CYSTOSCOPY,INSERT URETERAL STENT        24    OTHER SURGICAL HISTORY  2024    hysteroscopic currettage    WISDOM TEETH REMOVED      at age 18      Family History:    Family History   Problem Relation Age of Onset    Bipolar Disorder Mother     Diabetes Father     Glaucoma Father     Breast Cancer Maternal Grandmother 45    Other (bone cancer) Maternal Grandmother     Cancer Maternal Grandmother     No Known Problems Maternal Grandfather     Diabetes Paternal Grandmother     Heart Disease Paternal Grandfather     Blindness Paternal Grandfather     Heart Disease Maternal Aunt 40    Heart Disease Maternal Aunt 40    Breast Cancer Paternal Cousin 38        twin sisters (unsure of BRCA status)    Breast Cancer Paternal Cousin 38        twin sisters (unsure of BRCA status)    No Known Problems Brother          of meningitis    Breast Cancer Maternal Aunt     Dementia Maternal Aunt     Other (Other) Maternal Aunt         eye cancer     Heart Disease Maternal Aunt     Diabetes Paternal Aunt     Ovarian Cancer Neg     Colon Cancer Neg      Initial Intake:  Counts carbs   After dinner behavior: + f/v   Night eating:  Portion sizes:   Binge: -  Emotional: + stress eating  Depression: Score: 3  Grazing: -  Sweet tooth: + cookies   Crunchy/salty: + crackers   Etoh: None   Drinks only water   Soda Drinker: No                      Sports Drinks:  No                  Juice:  No                       Food Journal  Reviewed and Discussed:       Patient has a Food Journal?: yes   Patient is reading nutrition labels?  yes  Average Caloric Intake:     Average CHO Intake:   Is patient exercising? Yes   Type of exercise? 1 hour walks daily- mary[place]     Eating Habits  Patient states the following:  Eats 2-3 meal(s) per day  Length of time it takes to consume a meal:    # of snacks per day:  3  Type of snacks:  crackers, cookies, salad    Amount of soda consumption per day:  None   Amount of water (in ounces) per day:  adequate   Toughest challenge:     Low oxylate diet  IF    Nutritional Goals  Eat 3-4 cups of fresh fruits or vegetables daily    Behavior Modifications Reviewed and Discussed  Eat breakfast, Eat 3 meals per day, Plan meals in advance, Read nutrition labels, Drink 64 oz of water per day, Maintain a daily food journal, Utlize portion control strategies to reduce calorie intake, Identify triggers for eating and manage cues, and Eat slowly and take 20 to 30 minutes to complete each meal    Exercise Goals Reviewed and Discussed    Aim for 150 minutes moderate level exercise weekly with 2-3 days strength training as tolerated     ROS:    Constitutional: negative  Respiratory: negative  Cardiovascular: negative  Gastrointestinal: positive for IBS symptoms   Integument/breast: negative  Hematologic/lymphatic: negative  Musculoskeletal:negative  Neurological: negative  Behavioral/Psych: negative  Endocrine: negative  All other systems were reviewed and are negative    Physical Exam:   General appearance: alert, appears stated age, cooperative and obese  Head: Normocephalic, without obvious abnormality, atraumatic  Neck: no adenopathy, no carotid bruit, no JVD, supple, symmetrical, trachea midline and thyroid not enlarged, symmetric, no tenderness/mass/nodules  Lungs: clear to auscultation bilaterally  Heart: S1, S2 normal, no murmur,  click, rub or gallop, regular rate and rhythm  Abdomen: soft, non-tender; bowel sounds normal; no masses,  no organomegaly and abdomen obese   Extremities: intact, no edema   Pulses: 2+ and symmetric  Skin: intact   Neurologic: Grossly normal      ASSESSMENT       Encounter Diagnosis(ses):   Encounter Diagnoses   Name Primary?    Dyslipidemia Yes    Obesity (BMI 30-39.9)     Fatty liver     Stress     Recurrent kidney stones        PLAN         Diagnoses and all orders for this visit:    Dyslipidemia    Obesity (BMI 30-39.9)  -     buPROPion  MG Oral Tablet 24 Hr; Take 1 tablet (150 mg total) by mouth daily.  -     Phentermine HCl 37.5 MG Oral Tab; Take 1 tablet (37.5 mg total) by mouth every morning before breakfast.    Fatty liver    Stress  -     buPROPion  MG Oral Tablet 24 Hr; Take 1 tablet (150 mg total) by mouth daily.    Recurrent kidney stones          DYSLIPIDEMIA: Recommend dietary changes and lifestyle modifications as discussed below. Monitor.      Lab Results   Component Value Date/Time    CHOLEST 254 (H) 08/14/2023 03:22 PM     (H) 08/14/2023 03:22 PM    HDL 63 (H) 08/14/2023 03:22 PM    TRIG 265 (H) 08/14/2023 03:22 PM    VLDL 50 (H) 08/14/2023 03:22 PM     OBESITY/WEIGHT GAIN:  Fatty Liver Disease:  Hx gestational diabetes.     Recommended intensive lifestyle and behavioral modifications at this time for weight loss.     Educated patient on lifestyle modifications: Whole Food/Plant Strong/Low Glycemic Index diet, moderate alcohol consumption, reduced sodium intake to no more than 2,400 mg/day, and at least 150 minutes of moderate physical activity per week.   Avoid processed, poor quality carbohydrates, refined grains, flour, sugar.     Goals for next month:  1. Keep a food log.   2. Drink 64 ounces of non-caloric beverages per day. No fruit juices or regular soda.  3. Aim for 150 minutes moderate exercise per week.    4. Increase fruit and vegetable servings to 5-6 per day.    5.  Improve sleep and stress.      Reviewed other labs:  8/30/22- a1c 5.2. CMP otherwise in range.   TSH, CBC in range.   Continue iron supplement.      Discussed medication options for weight loss in detail with patient.      +stress eating- Restart 150 mg wellbutrin daily.     Continue/restart 37.5 mg phentermine in the AM.      Hx renal stones- recent- avoid topiramate and Qsymia.     Discussed risks, benefits, rationale, and side effects of medication(s). Avoid pregnancy during use of weight loss medications.     S/p normal echo, EKG NSR 10/13/22.    Has met with integrative RD. Continue low oxylate dietary pattern.      AICR/lifestyle recommendations per patient instructions.      Recommend daily magnesium for sleep.    RTC 3-4 months.      GAL Daniel

## 2024-04-28 PROCEDURE — 82570 ASSAY OF URINE CREATININE: CPT

## 2024-04-28 PROCEDURE — 82340 ASSAY OF CALCIUM IN URINE: CPT

## 2024-04-28 PROCEDURE — 82507 ASSAY OF CITRATE: CPT

## 2024-04-28 PROCEDURE — 81003 URINALYSIS AUTO W/O SCOPE: CPT

## 2024-04-28 PROCEDURE — 84560 ASSAY OF URINE/URIC ACID: CPT

## 2024-04-28 PROCEDURE — 84300 ASSAY OF URINE SODIUM: CPT

## 2024-04-28 PROCEDURE — 83945 ASSAY OF OXALATE: CPT

## 2024-04-29 ENCOUNTER — LAB ENCOUNTER (OUTPATIENT)
Dept: LAB | Facility: HOSPITAL | Age: 44
End: 2024-04-29
Attending: UROLOGY
Payer: COMMERCIAL

## 2024-04-29 DIAGNOSIS — N20.0 NEPHROLITHIASIS: ICD-10-CM

## 2024-04-29 LAB
CALCIUM 24H UR-SRATE: 156 MG/24HR (ref 100–300)
CREAT UR-SCNC: 1.02 G/24 HR (ref 0.6–1.8)
PH 24H UR: 7 [PH] (ref 5–8)
SODIUM SERPL-SCNC: 88 MMOL/L/24HR (ref 40–220)
SPECIMEN VOL UR: 2600 ML
URATE UR-MCNC: 421 MG/24HR (ref 250–750)

## 2024-05-01 DIAGNOSIS — N20.0 NEPHROLITHIASIS: Primary | ICD-10-CM

## 2024-05-01 LAB
CITRATE/CREAT RATIO: 887.47 MG/G CREAT
CITRIC ACID 24H UR: 902 MG/24 HR
CITRIC ACID UR: 347 MG/L
CREAT 24H UR: 1017 MG/24 HR
CREAT UR: 39.1 MG/DL

## 2024-05-02 LAB
CREAT UR: 39.4 MG/DL
OXALATE/CREAT. RATIO: 20.3 MG/G CREAT
OXALATES 24H UR: 21 MG/24 HR
OXALATES UR: 8 MG/L

## 2024-06-06 ENCOUNTER — PATIENT MESSAGE (OUTPATIENT)
Dept: SURGERY | Facility: CLINIC | Age: 44
End: 2024-06-06

## 2024-06-06 DIAGNOSIS — N20.0 NEPHROLITHIASIS: ICD-10-CM

## 2024-06-07 RX ORDER — POTASSIUM CITRATE 10 MEQ/1
20 TABLET, EXTENDED RELEASE ORAL 2 TIMES DAILY
Qty: 240 TABLET | Refills: 2 | Status: SHIPPED | OUTPATIENT
Start: 2024-06-07

## 2024-06-07 NOTE — TELEPHONE ENCOUNTER
Refill per protocol.    Kidney stone medications    Protocol criteria:  Appointment scheduled in the past 12 months or in the next 2 months  BMP or CMP within the last 1 year with normal potassium level and Creatinine level    Recent Outpatient Visits              2 months ago Dyslipidemia    [facility] Edna Pacheco APRN    Office Visit    2 months ago Tongue burning sensation    [facility] Zain Lin MD    Office Visit    2 months ago Telogen effluvium    [facility] Vik Uribe MD    Office Visit    4 months ago Menorrhagia with regular cycle    [facility] - OB/GYN Abril Plascencia MD    Office Visit    5 months ago Polyp of corpus uteri    [facility] - OB/GYN Abril Plascencia MD    Office Visit          Future Appointments         Provider Department Appt Notes    In 2 weeks Vik Uribe MD [facility] [address], [place] follow up    In 1 month Edna Pacheco APRN [facility]     In 2 months Azra Cyr PA-C [facility] body check    In 2 months Rita Boggs DO [facility] Any prior test results and having blood work done    In 3 months Abril Plascencia MD [facility] - OB/GYN annual    In 5 months [address] [facility] linked    In 6 months Meredith Cochran MD [facility] 1 year              Kidney:    Lab Results   Component Value Date    BUN 16 03/29/2024    BUN 15 03/20/2024    BUN 16 08/14/2023     Lab Results   Component Value Date    CREATSERUM 0.78 03/29/2024    CREATSERUM 0.74 03/20/2024    CREATSERUM  0.78 08/14/2023     Potassium:    Lab Results   Component Value Date    K 4.6 03/29/2024    K 4.4 03/20/2024    K 4.0 08/14/2023

## 2024-06-11 RX ORDER — SUMATRIPTAN 25 MG/1
25 TABLET, FILM COATED ORAL EVERY 2 HOUR PRN
Qty: 9 TABLET | Refills: 2 | Status: SHIPPED | OUTPATIENT
Start: 2024-06-11

## 2024-06-11 NOTE — TELEPHONE ENCOUNTER
Refill passed per protocol.    Requested Prescriptions   Pending Prescriptions Disp Refills    SUMAtriptan 25 MG Oral Tab 9 tablet 0     Sig: Take 1 tablet (25 mg total) by mouth every 2 (two) hours as needed for Migraine (max 2 doses per day).       Neurology Medications Passed - 6/6/2024 11:37 AM        Passed - In person appointment or virtual visit in the past 6 mos or appointment in next 3 mos     Recent Outpatient Visits              2 months ago Dyslipidemia    [facility] Edna Pacheco APRN    Office Visit    2 months ago Tongue burning sensation    [facility] Zain Lin MD    Office Visit    2 months ago Telogen effluvium    [facility] Vik Uribe MD    Office Visit    4 months ago Menorrhagia with regular cycle    [facility] - OB/GYN Abril Plascencia MD    Office Visit    5 months ago Polyp of corpus uteri    [facility] - OB/GYN Abril Plascencia MD    Office Visit          Future Appointments         Provider Department Appt Notes    In 1 week Vik Uribe MD [facility] [address], [place] follow up    In 1 month Edna Pacheco APRN [facility]     In 1 month Azra Cyr PA-C [facility] body check    In 2 months Rita Boggs DO [facility] Any prior test results and having blood work done    In 3 months Abril Plascencia MD [facility] - OB/GYN annual    In 5 months [address] Ultrasound [facility] order linked    In 6 months Meredith Cochran MD [facility] 1 year                         Future Appointments          Provider Department Appt Notes    In 1 week Vik Uribe MD [facility] [address], [place] follow up    In 1 month Edna Pacheco APRN [facility]     In 1 month Azra Cyr PA-C [facility] body check    In 2 months Rita Boggs DO [facility] Any prior test results and having blood work done    In 3 months Abril Plascencia MD [facility] - OB/GYN annual    In 5 months [address] Ul[facility]ound [facility] order linked    In 6 months Meredith Cochran MD [facility] 1 year          Recent Outpatient Visits              2 months ago Dyslipidemia    [facility] Edna Pacheco APRN    Office Visit    2 months ago Tongue burning sensation    [facility] Zain Lin MD    Office Visit    2 months ago Telogen effluvium    [facility] Vik Uribe MD    Office Visit    4 months ago Menorrhagia with regular cycle    [facility] - OB/GYN Abril Plascencia MD    Office Visit    5 months ago Polyp of corpus uteri    [facility] - OB/GYN Abril Plascencia MD    Office Visit

## 2024-06-25 RX ORDER — ERGOCALCIFEROL 1.25 MG/1
50000 CAPSULE ORAL WEEKLY
Qty: 12 CAPSULE | Refills: 0 | Status: SHIPPED | OUTPATIENT
Start: 2024-06-25

## 2024-06-25 NOTE — TELEPHONE ENCOUNTER
Refill Request for medication(s):     Last Office Visit: 3/20/24    Last Refill: 3/20/2024    Pharmacy, Dosage verified: yes    Component  Ref Range & Units 3/20/24 10:29 AM   Vitamin D, 25OH, Total  30.0 - 100.0 ng/mL 17.1 Low        Repeat labs?

## 2024-07-07 ENCOUNTER — HOSPITAL ENCOUNTER (OUTPATIENT)
Dept: MRI IMAGING | Facility: HOSPITAL | Age: 44
End: 2024-07-07
Attending: FAMILY MEDICINE
Payer: COMMERCIAL

## 2024-07-07 ENCOUNTER — HOSPITAL ENCOUNTER (OUTPATIENT)
Dept: MRI IMAGING | Facility: HOSPITAL | Age: 44
Discharge: HOME | End: 2024-07-07
Attending: FAMILY MEDICINE
Payer: COMMERCIAL

## 2024-07-07 DIAGNOSIS — R92.30 DENSE BREAST TISSUE ON MAMMOGRAM, UNSPECIFIED TYPE: ICD-10-CM

## 2024-07-07 DIAGNOSIS — R92.8 ABNORMAL MRI, BREAST: ICD-10-CM

## 2024-07-07 DIAGNOSIS — Z91.89 AT HIGH RISK FOR BREAST CANCER: ICD-10-CM

## 2024-07-07 DIAGNOSIS — Z80.3 FAMILY HISTORY OF BREAST CANCER: ICD-10-CM

## 2024-07-07 PROCEDURE — 77049 MRI BREAST C-+ W/CAD BI: CPT | Performed by: FAMILY MEDICINE

## 2024-07-07 PROCEDURE — A9575 INJ GADOTERATE MEGLUMI 0.1ML: HCPCS | Performed by: FAMILY MEDICINE

## 2024-07-07 RX ORDER — GADOTERATE MEGLUMINE 376.9 MG/ML
20 INJECTION INTRAVENOUS
Status: COMPLETED | OUTPATIENT
Start: 2024-07-07 | End: 2024-07-07

## 2024-07-07 RX ADMIN — GADOTERATE MEGLUMINE 16 ML: 376.9 INJECTION INTRAVENOUS at 16:02:00

## 2024-07-08 DIAGNOSIS — Z91.89 AT HIGH RISK FOR BREAST CANCER: ICD-10-CM

## 2024-07-08 DIAGNOSIS — R92.8 ABNORMAL MRI, BREAST: ICD-10-CM

## 2024-07-08 DIAGNOSIS — Z80.3 FAMILY HISTORY OF BREAST CANCER: ICD-10-CM

## 2024-07-08 DIAGNOSIS — R92.30 DENSE BREAST TISSUE ON MAMMOGRAM, UNSPECIFIED TYPE: Primary | ICD-10-CM

## 2024-07-22 DIAGNOSIS — F43.9 STRESS: ICD-10-CM

## 2024-07-22 DIAGNOSIS — E66.9 OBESITY (BMI 30-39.9): ICD-10-CM

## 2024-07-22 RX ORDER — BUPROPION HYDROCHLORIDE 150 MG/1
150 TABLET ORAL DAILY
Qty: 90 TABLET | Refills: 0 | Status: SHIPPED | OUTPATIENT
Start: 2024-07-22

## 2024-08-15 NOTE — PROGRESS NOTES
MFM managing sugars. Pt surprised by plan for IOL at 38 wks for insulin dependent diabetic. \"Have you been to the ER, urgent care clinic since your last visit?  Hospitalized since your last visit?\"    NO    “Have you seen or consulted any other health care providers outside of [facility] since your last visit?”    NO            Click Here for Release of Records Request

## 2024-08-26 ENCOUNTER — OFFICE VISIT (OUTPATIENT)
Facility: CLINIC | Age: 44
End: 2024-08-26
Payer: COMMERCIAL

## 2024-08-26 VITALS
WEIGHT: 165 LBS | DIASTOLIC BLOOD PRESSURE: 70 MMHG | BODY MASS INDEX: 31.15 KG/M2 | HEIGHT: 61 IN | SYSTOLIC BLOOD PRESSURE: 110 MMHG | OXYGEN SATURATION: 99 % | HEART RATE: 95 BPM

## 2024-08-26 DIAGNOSIS — Z00.00 ENCOUNTER FOR ROUTINE ADULT HEALTH EXAMINATION WITHOUT ABNORMAL FINDINGS: Primary | ICD-10-CM

## 2024-08-26 DIAGNOSIS — E55.9 VITAMIN D DEFICIENCY: ICD-10-CM

## 2024-08-26 DIAGNOSIS — K59.00 CONSTIPATION, UNSPECIFIED CONSTIPATION TYPE: ICD-10-CM

## 2024-08-26 DIAGNOSIS — G43.009 MIGRAINE WITHOUT AURA AND WITHOUT STATUS MIGRAINOSUS, NOT INTRACTABLE: ICD-10-CM

## 2024-08-26 DIAGNOSIS — R12 HEARTBURN: ICD-10-CM

## 2024-08-26 DIAGNOSIS — E61.1 IRON DEFICIENCY: ICD-10-CM

## 2024-08-26 DIAGNOSIS — J45.20 MILD INTERMITTENT ASTHMA WITHOUT COMPLICATION (HCC): ICD-10-CM

## 2024-08-26 RX ORDER — SUMATRIPTAN 50 MG/1
50 TABLET, FILM COATED ORAL EVERY 2 HOUR PRN
Qty: 9 TABLET | Refills: 2 | Status: SHIPPED | OUTPATIENT
Start: 2024-08-26

## 2024-09-27 ENCOUNTER — TELEPHONE (OUTPATIENT)
Facility: CLINIC | Age: 44
End: 2024-09-27

## 2024-09-27 NOTE — TELEPHONE ENCOUNTER
Patient  is calling stating that his wife has been coded for two different visits on 8/26/2024 one for a office visit and for a physical and is being charged a copay for the office visit when it was just a physical. Please follow up with patient .

## 2024-11-17 DIAGNOSIS — F43.9 STRESS: ICD-10-CM

## 2024-11-17 DIAGNOSIS — E66.9 OBESITY (BMI 30-39.9): ICD-10-CM

## 2024-11-18 RX ORDER — BUPROPION HYDROCHLORIDE 150 MG/1
150 TABLET ORAL DAILY
Qty: 30 TABLET | Refills: 0 | Status: SHIPPED | OUTPATIENT
Start: 2024-11-18

## 2024-11-21 DIAGNOSIS — E66.9 OBESITY (BMI 30-39.9): ICD-10-CM

## 2024-11-22 ENCOUNTER — TELEPHONE (OUTPATIENT)
Dept: SURGERY | Facility: CLINIC | Age: 44
End: 2024-11-22

## 2024-11-22 RX ORDER — PHENTERMINE HYDROCHLORIDE 37.5 MG/1
37.5 TABLET ORAL
Qty: 30 TABLET | Refills: 0 | Status: SHIPPED | OUTPATIENT
Start: 2024-11-22

## 2024-11-29 ENCOUNTER — HOSPITAL ENCOUNTER (OUTPATIENT)
Age: 44
Discharge: HOME | End: 2024-11-29
Payer: COMMERCIAL

## 2024-11-29 VITALS
SYSTOLIC BLOOD PRESSURE: 134 MMHG | DIASTOLIC BLOOD PRESSURE: 84 MMHG | RESPIRATION RATE: 16 BRPM | TEMPERATURE: 99 F | HEART RATE: 107 BPM | OXYGEN SATURATION: 98 %

## 2024-11-29 DIAGNOSIS — H92.01 RIGHT EAR PAIN: Primary | ICD-10-CM

## 2024-11-29 PROCEDURE — 99213 OFFICE O/P EST LOW 20 MIN: CPT | Performed by: NURSE PRACTITIONER

## 2024-11-29 RX ORDER — MATRICARIA RECUTITA, MERCURIUS SOLUBILIS, ANEMONE PATENS AND SULPHUR 10; 15; 12; 12 [HP_X]/10ML; [HP_X]/10ML; [HP_X]/10ML; [HP_X]/10ML
2 SOLUTION AURICULAR (OTIC) 2 TIMES DAILY
Qty: 10 ML | Refills: 0 | Status: SHIPPED | OUTPATIENT
Start: 2024-11-29

## 2024-11-30 NOTE — DISCHARGE INSTRUCTIONS
Heat  Ibuprofen, Benadryl tonight decongestants tomorrow over-the-counter ear pain drops return for concerns

## 2024-11-30 NOTE — ED PROVIDER NOTES
Patient Seen in: [facility]      History   No chief complaint on file.    Stated Complaint: Ear Pain    Subjective:   HPI      44-year-old female presents with right ear pain.  She does report muffled feeling for the last few days as the fluid is in the air.  Today with pain.  No drainage.  No fever    Objective:     Past Medical History:    Acne    Allergic rhinitis    Anesthesia complication    Anxiety    Asthma (HCC)    Calculus of kidney    Environmental allergies    Esophageal reflux    Starting having heartburn    Fatty liver    Gestational diabetes (HCC)    High cholesterol    History of anemia    History of migraine    Hyperlipidemia    Kidney stone    Migraines    Obesity    PONV (postoperative nausea and vomiting)    Recurrent kidney stones    Visual impairment              Past Surgical History:   Procedure Laterality Date    Colonoscopy N/A 2023    Procedure: COLONOSCOPY;  Surgeon: Fausto Cosme MD;  Location: [facility] ENDO    Cystoscopy,insert ureteral stent  2023      24    Other surgical history  2024    hysteroscopic currettage    [place] teeth removed      at age 18                 Social History     Socioeconomic History    Marital status:    Occupational History    Occupation: [employer]   Tobacco Use    Smoking status: Never     Passive exposure: Never    Smokeless tobacco: Never   Vaping Use    Vaping status: Never Used   Substance and Sexual Activity    Alcohol use: Not Currently    Drug use: Never    Sexual activity: Yes     Partners: Male     Birth control/protection: Vasectomy   Other Topics Concern    Blood Transfusions No    Caffeine Concern No    Stress Concern No    Weight Concern Yes    Special Diet No    Exercise No    Seat Belt No    Grew up on a farm No    History of tanning No    Outdoor occupation Yes    Breast feeding No    Reaction to local anesthetic No    Pt has a pacemaker No    Pt has a defibrillator No   Social History Narrative     Live with  (Bryan)    No h/o abuse              Review of Systems    Positive for stated complaint: Ear Pain  Other systems are as noted in HPI.  Constitutional and vital signs reviewed.      All other systems reviewed and negative except as noted above.    Physical Exam     ED Triage Vitals [11/29/24 1915]   /84   Pulse 107   Resp 16   Temp 98.6 °F (37 °C)   Temp src Oral   SpO2 98 %   O2 Device None (Room air)       Current Vitals:   Vital Signs  BP: 134/84  Pulse: 107  Resp: 16  Temp: 98.6 °F (37 °C)  Temp src: Oral    Oxygen Therapy  SpO2: 98 %  O2 Device: None (Room air)        Physical Exam  Vitals and nursing note reviewed.   Constitutional:       Appearance: Normal appearance.   HENT:      Right Ear: Tympanic membrane, ear canal and external ear normal.      Left Ear: Tympanic membrane, ear canal and external ear normal.   Neurological:      Mental Status: She is alert.             ED Course   Labs Reviewed - No data to display                MDM              Medical Decision Making  Otitis media otitis externa otalgia URI sinusitis  Ear exam is unremarkable there is no effusion no erythema canal normal  Otalgia support care PMD follow-up return for concerns    Problems Addressed:  Right ear pain: acute illness or injury    Risk  OTC drugs.  Prescription drug management.        Disposition and Plan     Clinical Impression:  1. Right ear pain         Disposition:  Discharge  11/29/2024  7:30 pm    Follow-up:  Rita Boggs DO  [address] [place] [id number]  [phone]    Schedule an appointment as soon as possible for a visit   As needed          Medications Prescribed:  Current Discharge Medication List        START taking these medications    Details   Homeopathic Products (SIMILASAN KIDS EAR RELIEF) Otic Solution Place 2 drops in ear(s) in the morning and 2 drops before bedtime.  Qty: 10 mL, Refills: 0                 Supplementary Documentation:

## 2024-12-02 ENCOUNTER — HOSPITAL ENCOUNTER (OUTPATIENT)
Dept: ULTRASOUND IMAGING | Facility: HOSPITAL | Age: 44
Discharge: HOME | End: 2024-12-02
Attending: UROLOGY
Payer: COMMERCIAL

## 2024-12-02 DIAGNOSIS — N20.0 NEPHROLITHIASIS: ICD-10-CM

## 2024-12-02 PROCEDURE — 76770 US EXAM ABDO BACK WALL COMP: CPT | Performed by: UROLOGY

## 2024-12-20 ENCOUNTER — ORDER TRANSCRIPTION (OUTPATIENT)
Dept: ADMINISTRATIVE | Facility: HOSPITAL | Age: 44
End: 2024-12-20

## 2024-12-20 DIAGNOSIS — Z12.31 ENCOUNTER FOR SCREENING MAMMOGRAM FOR MALIGNANT NEOPLASM OF BREAST: Primary | ICD-10-CM

## 2024-12-29 DIAGNOSIS — N20.0 NEPHROLITHIASIS: ICD-10-CM

## 2024-12-30 RX ORDER — POTASSIUM CITRATE 10 MEQ/1
20 TABLET, EXTENDED RELEASE ORAL 2 TIMES DAILY
Qty: 240 TABLET | Refills: 0 | Status: SHIPPED | OUTPATIENT
Start: 2024-12-30

## 2024-12-30 NOTE — TELEPHONE ENCOUNTER
Refill for potassium citrate sent to pharmacy per protocol.     Kidney stone medications    Protocol criteria:  Appointment scheduled in the past 12 months or in the next 2 months  BMP or CMP within the last 1 year with normal potassium level and Creatinine level    Recent Outpatient Visits              4 months ago Encounter for routine adult health examination without abnormal findings    [facility] Rita Boggs DO    Office Visit    9 months ago [facility]    [facility]    9 months ago [facility]ue [facility]    [facility]    9 months ago [facility]lu[facility]ium    [facility]    [facility]    11 months ago Menorrhagia with regular cycle    [facility] - OB[facility]GYN Abril Plascencia MD    Office Visit          Future Appointments         Provider Department Appt Notes    In 1 week [facility] MD [facility]    In 3 weeks Meredith Cochran MD [facility] Test results and treatment plan to keep preventing kidney stones    In 1 month Fausto Cosme MD [facility] Constipation and Heartburn Issues    In 2 months Edna Pacheco APRN [facility]               Kidney:    Lab Results   Component Value Date    BUN 16 03/29/2024    BUN 15 03/20/2024    BUN 16 08/14/2023     Lab Results   Component Value Date    CREATSERUM 0.78 03/29/2024    CREATSERUM 0.74 03/20/2024    CREATSERUM 0.78 08/14/2023     Potassium:    Lab Results   Component Value Date    K 4.6 03/29/2024    K 4.4 03/20/2024    K 4.0 08/14/2023

## 2025-01-03 NOTE — TELEPHONE ENCOUNTER
From: Annabella Live  To: Meredith Cochran  Sent: 6/6/2024 11:43 AM CDT  Subject: Refill For Potassium Citrate     Hi Dr. Cochran,   Hope that you are doing well. I need a refill for the Potassium Citrate Pills.  Also making sure that you put in the requested blood work that I need to get done.  Thank you,  Annabella Live   
denies pain/discomfort (Rating = 0)

## 2025-01-09 ENCOUNTER — OFFICE VISIT (OUTPATIENT)
Dept: OBGYN CLINIC | Facility: CLINIC | Age: 45
End: 2025-01-09
Payer: COMMERCIAL

## 2025-01-09 ENCOUNTER — LAB ENCOUNTER (OUTPATIENT)
Dept: LAB | Facility: HOSPITAL | Age: 45
End: 2025-01-09
Attending: UROLOGY
Payer: COMMERCIAL

## 2025-01-09 VITALS
DIASTOLIC BLOOD PRESSURE: 86 MMHG | HEART RATE: 102 BPM | WEIGHT: 178 LBS | HEIGHT: 60.7 IN | BODY MASS INDEX: 34.04 KG/M2 | SYSTOLIC BLOOD PRESSURE: 129 MMHG

## 2025-01-09 DIAGNOSIS — Z01.411 ENCOUNTER FOR GYNECOLOGICAL EXAMINATION WITH ABNORMAL FINDING: Primary | ICD-10-CM

## 2025-01-09 DIAGNOSIS — G43.829 MENSTRUAL MIGRAINE WITHOUT STATUS MIGRAINOSUS, NOT INTRACTABLE: ICD-10-CM

## 2025-01-09 DIAGNOSIS — Z11.3 SCREEN FOR STD (SEXUALLY TRANSMITTED DISEASE): ICD-10-CM

## 2025-01-09 DIAGNOSIS — Z12.4 SCREENING FOR CERVICAL CANCER: ICD-10-CM

## 2025-01-09 DIAGNOSIS — N20.0 NEPHROLITHIASIS: ICD-10-CM

## 2025-01-09 LAB
ANION GAP SERPL CALC-SCNC: 8 MMOL/L (ref 0–18)
BUN BLD-MCNC: 16 MG/DL (ref 9–23)
BUN/CREAT SERPL: 18.4 (ref 10–20)
CALCIUM BLD-MCNC: 9.8 MG/DL (ref 8.7–10.4)
CHLORIDE SERPL-SCNC: 106 MMOL/L (ref 98–112)
CO2 SERPL-SCNC: 28 MMOL/L (ref 21–32)
CREAT BLD-MCNC: 0.87 MG/DL
DEPRECATED RDW RBC AUTO: 47.9 FL (ref 35.1–46.3)
EGFRCR SERPLBLD CKD-EPI 2021: 85 ML/MIN/1.73M2 (ref 60–?)
ERYTHROCYTE [DISTWIDTH] IN BLOOD BY AUTOMATED COUNT: 14.5 % (ref 11–15)
FASTING STATUS PATIENT QL REPORTED: NO
GLUCOSE BLD-MCNC: 92 MG/DL (ref 70–99)
HBV SURFACE AG SER-ACNC: <0.1 [IU]/L
HBV SURFACE AG SERPL QL IA: NONREACTIVE
HCT VFR BLD AUTO: 40.1 %
HCV AB SERPL QL IA: NONREACTIVE
HGB BLD-MCNC: 13.4 G/DL
MCH RBC QN AUTO: 30.2 PG (ref 26–34)
MCHC RBC AUTO-ENTMCNC: 33.4 G/DL (ref 31–37)
MCV RBC AUTO: 90.3 FL
OSMOLALITY SERPL CALC.SUM OF ELEC: 295 MOSM/KG (ref 275–295)
PLATELET # BLD AUTO: 257 10(3)UL (ref 150–450)
POTASSIUM SERPL-SCNC: 4.4 MMOL/L (ref 3.5–5.1)
RBC # BLD AUTO: 4.44 X10(6)UL
SODIUM SERPL-SCNC: 142 MMOL/L (ref 136–145)
T PALLIDUM AB SER QL IA: NONREACTIVE
TSI SER-ACNC: 1.12 UIU/ML (ref 0.55–4.78)
WBC # BLD AUTO: 8.8 X10(3) UL (ref 4–11)

## 2025-01-09 PROCEDURE — 3008F BODY MASS INDEX DOCD: CPT | Performed by: OBSTETRICS & GYNECOLOGY

## 2025-01-09 PROCEDURE — 99396 PREV VISIT EST AGE 40-64: CPT | Performed by: OBSTETRICS & GYNECOLOGY

## 2025-01-09 PROCEDURE — 84443 ASSAY THYROID STIM HORMONE: CPT

## 2025-01-09 PROCEDURE — 86780 TREPONEMA PALLIDUM: CPT

## 2025-01-09 PROCEDURE — 87389 HIV-1 AG W/HIV-1&-2 AB AG IA: CPT

## 2025-01-09 PROCEDURE — 80048 BASIC METABOLIC PNL TOTAL CA: CPT

## 2025-01-09 PROCEDURE — 85027 COMPLETE CBC AUTOMATED: CPT

## 2025-01-09 PROCEDURE — 87340 HEPATITIS B SURFACE AG IA: CPT

## 2025-01-09 PROCEDURE — 3074F SYST BP LT 130 MM HG: CPT | Performed by: OBSTETRICS & GYNECOLOGY

## 2025-01-09 PROCEDURE — 86803 HEPATITIS C AB TEST: CPT

## 2025-01-09 PROCEDURE — 3079F DIAST BP 80-89 MM HG: CPT | Performed by: OBSTETRICS & GYNECOLOGY

## 2025-01-09 PROCEDURE — 99212 OFFICE O/P EST SF 10 MIN: CPT | Performed by: OBSTETRICS & GYNECOLOGY

## 2025-01-09 PROCEDURE — 36415 COLL VENOUS BLD VENIPUNCTURE: CPT

## 2025-01-09 RX ORDER — DROSPIRENONE AND ETHINYL ESTRADIOL 0.02-3(28)
1 KIT ORAL DAILY
Qty: 84 TABLET | Refills: 1 | Status: SHIPPED | OUTPATIENT
Start: 2025-01-09 | End: 2026-01-09

## 2025-01-09 RX ORDER — DROSPIRENONE AND ETHINYL ESTRADIOL 0.02-3(28)
1 KIT ORAL DAILY
Qty: 28 TABLET | Refills: 12 | Status: SHIPPED | OUTPATIENT
Start: 2025-01-09 | End: 2025-01-09

## 2025-01-09 NOTE — PROGRESS NOTES
Annabella Live is a 44 year old female  Patient's last menstrual period was 2024 (approximate).   Chief Complaint   Patient presents with    Gyn Exam     ANNUAL EXAM    Menstrual Problem     Severe headaches prior to period. Last one actually occurred after period.     Sexually Transmitted Infection Screen     Wishes for full screen   .    OBSTETRICS HISTORY:     OB History    Para Term  AB Living   1 1 1 0 0 1   SAB IAB Ectopic Multiple Live Births   0 0 0 0 1      # Outcome Date GA Lbr Dav/2nd Weight Sex Type Anes PTL Lv   1 Term 22 39w0d 01:24 / 00:07 6 lb 10.9 oz (3.03 kg) M NORMAL SPONT None N FLAKO      Name: PUSHPA,BOY      Apgar1: 8  Apgar5: 9       GYNE HISTORY:     Periods regular monthly      BCM:  Vasectomy    History   Sexual Activity    Sexual activity: Yes    Partners: Male    Birth control/ protection: Vasectomy        Hx Prior Abnormal Pap: No  Pap Date: 21  Pap Result Notes: Neg Pap/HPV // Mammo BILATERAL 24 BENIGN          Latest Ref Rng & Units 2021     9:02 AM   RECENT PAP RESULTS   INTERPRETATION/RESULT: Negative for intraepithelial lesion or malignancy Negative for intraepithelial lesion or malignancy    HPV Negative Negative          MEDICAL HISTORY:     Past Medical History:    Acne    Allergic rhinitis    Anesthesia complication    Anxiety    Asthma (HCC)    Calculus of kidney    Environmental allergies    Esophageal reflux    Starting having heartburn    Fatty liver    Gestational diabetes (HCC)    High cholesterol    History of anemia    History of migraine    Hyperlipidemia    Kidney stone    Migraines    Obesity    PONV (postoperative nausea and vomiting)    Recurrent kidney stones    Visual impairment     Past Surgical History:   Procedure Laterality Date    Colonoscopy N/A 2023    Procedure: COLONOSCOPY;  Surgeon: Fausto Cosme MD;  Location: [facility] END[facility]    Cystoscopy,insert ureteral stent  2023      22     Other surgical history  2024    hysteroscopic currettage    [place] teeth removed      at age 18      OB History    Para Term  AB Living   1 1 1 0 0 1   SAB IAB Ectopic Multiple Live Births   0 0 0 0 1        SOCIAL HISTORY:     Tobacco Use: Low Risk  (2024)    Patient History     Smoking Tobacco Use: Never     Smokeless Tobacco Use: Never     Passive Exposure: Never       FAMILY HISTORY:     Family History   Problem Relation Age of Onset    Bipolar Disorder Mother     Diabetes Father     Glaucoma Father     Breast Cancer Maternal Grandmother 45    Other (bone cancer) Maternal Grandmother     Cancer Maternal Grandmother     No Known Problems Maternal Grandfather     Diabetes Paternal Grandmother     Heart Disease Paternal Grandfather     Blindness Paternal Grandfather     Heart Disease Maternal Aunt 40    Heart Disease Maternal Aunt 40    Breast Cancer Paternal Cousin 38        twin sisters (unsure of BRCA status)    Breast Cancer Paternal Cousin 38        twin sisters (unsure of BRCA status)    No Known Problems Brother          of meningitis    Breast Cancer Maternal Aunt     Dementia Maternal Aunt     Other (Other) Maternal Aunt         eye cancer     Heart Disease Maternal Aunt     Diabetes Paternal Aunt     Ovarian Cancer Neg     Colon Cancer Neg          MEDICATIONS:       Current Outpatient Medications:     Drospirenone-Ethinyl Estradiol (BETO) 3-0.02 MG Oral Tab, Take 1 tablet by mouth daily., Disp: 84 tablet, Rfl: 1    potassium citrate 10 MEQ (1080 MG) Oral Tab CR, TAKE 2 TABLETS BY MOUTH IN THE MORNING AND 2 TABLETS BEFORE BEDTIME, Disp: 240 tablet, Rfl: 0    PHENTERMINE HCL 37.5 MG Oral Tab, TAKE 1 TABLET(37.5 MG) BY MOUTH EVERY MORNING BEFORE BREAKFAST, Disp: 30 tablet, Rfl: 0    buPROPion  MG Oral Tablet 24 Hr, Take 1 tablet (150 mg total) by mouth daily., Disp: 30 tablet, Rfl: 0    SUMAtriptan 50 MG Oral Tab, Take 1 tablet (50 mg total) by mouth every 2 (two) hours as  needed for Migraine (max 2 doses per day)., Disp: 9 tablet, Rfl: 2    ERGOCALCIFEROL 1.25 MG (82193 UT) Oral Cap, TAKE 1 CAPSULE BY MOUTH 1 TIME A WEEK FOR 12 DOSES, Disp: 12 capsule, Rfl: 0    BIOTIN OR, Take 1 capsule by mouth daily., Disp: , Rfl:     clobetasol 0.05 % External Solution, Apply 1 mL topically daily as needed., Disp: 60 mL, Rfl: 3    acidophilus-pectin Oral Cap, Take 1 capsule by mouth daily., Disp: , Rfl:     Budesonide (PULMICORT FLEXHALER) 90 MCG/ACT Inhalation Aerosol Powder, Breath Activated, Inhale 1 puff into the lungs 2 (two) times daily as needed., Disp: 2 each, Rfl: 2    montelukast 10 MG Oral Tab, Take 1 tablet (10 mg total) by mouth nightly., Disp: 90 tablet, Rfl: 1    albuterol 108 (90 Base) MCG/ACT Inhalation Aero Soln, Inhale 2 puffs into the lungs every 6 (six) hours as needed for Wheezing or Shortness of Breath., Disp: 1 each, Rfl: 0    prenatal vitamin w/DHA 27-0.8-228 MG Oral Cap, Take 1 capsule by mouth daily., Disp: , Rfl:     Blood Glucose Monitoring Suppl (ONETOUCH VERIO FLEX SYSTEM) w/Device Does not apply Kit, 1 kit by Does not apply route 2 (two) times daily. May substitute per insurance formulary, Disp: 1 kit, Rfl: 0    ALLERGIES:     Allergies[1]      REVIEW OF SYSTEMS:     Constitutional:    denies fever / chills  Eyes:     denies blurred or double vision  Cardiovascular:  denies chest pain or palpitations  Respiratory:    denies shortness of breath  Gastrointestinal:  denies severe abdominal pain, frequent diarrhea or constipation, nausea / vomiting  Genitourinary:    denies dysuria, bothersome incontinence  Skin/Breast:   denies any breast pain, lumps, or discharge  Neurological:    denies frequent severe headaches  Psychiatric:   denies depression or anxiety, thoughts of harming self or others  Heme/Lymph:    denies easy bruising or bleeding      PHYSICAL EXAM:   Blood pressure 129/86, pulse 102, height 5' 0.7\" (1.542 m), weight 178 lb (80.7 kg), last menstrual period  12/19/2024, not currently breastfeeding.  Constitutional:  well developed, well nourished  Head/Face:  normocephalic  Neck/Thyroid: thyroid symmetric, no thyromegaly, no nodules, no adenopathy  Lymphatic: no abnormal supraclavicular or axillary adenopathy is noted  Breast:   normal without palpable masses, tenderness, asymmetry, nipple discharge, nipple retraction or skin changes  Abdomen:   soft, nontender, nondistended, no masses  Skin/Hair:  no unusual rashes or bruises  Extremities:  no edema, no cyanosis  Psychiatric:   oriented to time, place, person and situation. Appropriate mood and affect    Pelvic Exam:  External Genitalia:  normal appearance, hair distribution, and no lesions  Urethral Meatus:   normal in size, location, without lesions and prolapse  Bladder:    no fullness, masses or tenderness  Vagina:    normal appearance without lesions, no abnormal discharge  Cervix:    normal without tenderness on motion  Uterus:    normal in size, contour, position, mobility, without tenderness  Adnexa:   normal without masses or tenderness  Perineum:   normal  Anus: no hemorroids         ASSESSMENT & PLAN:     Annabella was seen today for gyn exam, menstrual problem and sexually transmitted infection screen.    Diagnoses and all orders for this visit:    Encounter for gynecological examination with abnormal finding    Menstrual migraine without status migrainosus, not intractable  -     CBC, Platelet; No Differential; Future  -     TSH W Reflex To Free T4; Future    Screen for STD (sexually transmitted disease)  -     HCV Antibody; Future  -     Hepatitis B Surface Antigen; Future  -     HIV Ag/Ab Combo; Future  -     T Pallidum Screening [place]; Future    Other orders  -     Discontinue: Drospirenone-Ethinyl Estradiol (BETO) 3-0.02 MG Oral Tab; Take 1 tablet by mouth daily.  -     Drospirenone-Ethinyl Estradiol (BETO) 3-0.02 MG Oral Tab; Take 1 tablet by mouth daily.      Reviewed link w/ hormonal fluctations &  menstral HA -- plan for trial of 24 day ocps regimen. Start on first day of next period. Follow up during 4th pack  Check CBC & TSH to make sure not contributing factors.  Denies any visual issues w/ HA    SUMMARY:  Pap: Next cotest today per ASCCP guidelines.  BCM:  Vasectomy  STD screening: GC/Chl/Trich/HepB/HepC/HIV/RPR, condoms encouraged  Mammogram: has order from PCP  HM updated  Depression screen:   Depression Screening (PHQ-2/PHQ-9): Over the LAST 2 WEEKS   Little interest or pleasure in doing things: Not at all    Feeling down, depressed, or hopeless: Not at all    PHQ-2 SCORE: 0          FOLLOW-UP     Return for med follow-up in 3-4 months.    Note to patient and family:  The 21st Century Cures Act makes medical notes available to patients in the interest of transparency.  However, please be advised that this is a medical document.  It is intended as a peer to peer communication.  It is written in medical language and may contain abbreviations or verbiage that are technical and unfamiliar.  It may appear blunt or direct.  Medical documents are intended to carry relevant information, facts as evident, and the clinical opinion of the practitioner.         [1] No Known Allergies

## 2025-01-10 LAB
C TRACH DNA SPEC QL NAA+PROBE: NEGATIVE
HPV E6+E7 MRNA CVX QL NAA+PROBE: NEGATIVE
N GONORRHOEA DNA SPEC QL NAA+PROBE: NEGATIVE
T VAGINALIS RRNA SPEC QL NAA+PROBE: NEGATIVE

## 2025-01-13 LAB — LAST PAP RESULT: NORMAL

## 2025-01-21 ENCOUNTER — OFFICE VISIT (OUTPATIENT)
Dept: SURGERY | Facility: CLINIC | Age: 45
End: 2025-01-21

## 2025-01-21 ENCOUNTER — LAB ENCOUNTER (OUTPATIENT)
Dept: LAB | Facility: HOSPITAL | Age: 45
End: 2025-01-21
Attending: UROLOGY
Payer: COMMERCIAL

## 2025-01-21 DIAGNOSIS — N20.0 NEPHROLITHIASIS: Primary | ICD-10-CM

## 2025-01-21 PROCEDURE — 99213 OFFICE O/P EST LOW 20 MIN: CPT | Performed by: UROLOGY

## 2025-01-21 RX ORDER — POTASSIUM CITRATE 10 MEQ/1
20 TABLET, EXTENDED RELEASE ORAL 2 TIMES DAILY
Qty: 240 TABLET | Refills: 0 | Status: SHIPPED | OUTPATIENT
Start: 2025-01-21

## 2025-01-21 NOTE — PROGRESS NOTES
Meredith Cochran MD  Department of Urology  [address] [place] [address]., [address]  [place] [address]    T: [phone]  F: [phone]    To: Rita Boggs DO   [address] [facility] [address]  [facility] [address]    Re: Annabella Live   MRN: [id number]  : 3/3/1980    Dear Rita Boggs DO,    Today I had the pleasure of seeing Annabella Live in my clinic. As you know, Ms. Live is a pleasant 44 year old year old female who I am seeing for followup. Patient was last seen in this department on Visit date not found.    Briefly, patient has followed with me for nephrolithiasis since 2022.  In May 2023 she underwent left ureteroscopy laser lithotripsy.  Please note the stone was severely impacted.  Stone analysis did return calcium oxalate dihydrate 100%.  She had a known stone on the right side which was being monitored.     She developed a stone on the right side that required ureteroscopy on 2023.  All of the stones were treated and her stent was placed on the string.  Her stent was removed by nursing.     She had a renal bladder ultrasound that demonstrated no abnormalities 16 weeks later.     She also underwent 24-hour urine testing but the collection was is slight undercollection and therefore she repeated her 24-hour urine testing.  Some of the values are still pending.     24 Hour Metabolic Urine Study Results     Creatinine:-Pending  Normal 18-20 mg/kilogram/day (female)  Normal 20-22 mg/kilogram/day (male)  Cystinuria present? No  Volume: 2000 mL/day-slightly low  Goal >2500  Calcium: 240 mg/day  Goal <250  Oxalate:-Pending  Goal <40  Citrate:-Pending  Goal >450  Sodium: 120 mmol/day   Goal <150  pH: 6.5  Goal 5.8 - 6.2 (>6 for UA stones, >7 for cystine stone)  Uric acid: 388 mg/day  Goal <750     Last stone analysis: Calcium oxalate dihydrate, hydroxyapatite  Serum calcium: 9.3  PTH: 58.3  Serum uric acid: No value     Plan at last visit on 2023 was to return to clinic in 1 year  with renal bladder ultrasound prior.  We also recommended stone prevention mechanisms.  She ultimately decided to start potassium citrate.  She is on 20 mEq twice a day.  She has not completed a repeat 24 urine test.          PAST MEDICAL HISTORY:  Past Medical History:    Acne    Allergic rhinitis    Anesthesia complication    Anxiety    Asthma (HCC)    Calculus of kidney    Environmental allergies    Esophageal reflux    Starting having heartburn    Fatty liver    Gestational diabetes (HCC)    High cholesterol    History of anemia    History of migraine    Hyperlipidemia    Kidney stone    Migraines    Obesity    PONV (postoperative nausea and vomiting)    Recurrent kidney stones    Visual impairment        PAST SURGICAL HISTORY:  Past Surgical History:   Procedure Laterality Date    Colonoscopy N/A 2023    Procedure: COLONOSCOPY;  Surgeon: Fausto Cosme MD;  Location: [facility]    Cystoscopy,insert ureteral stent  2023      22    Other surgical history  2024    hysteroscopic currettage    [place] teeth removed      at age 18          ALLERGIES:  Allergies[1]      MEDICATIONS:  Current Outpatient Medications   Medication Instructions    acidophilus-pectin Oral Cap 1 capsule, Oral, Daily    albuterol 108 (90 Base) MCG/ACT Inhalation Aero Soln 2 puffs, Inhalation, Every 6 hours PRN    BIOTIN OR 1 capsule, Daily    Blood Glucose Monitoring Suppl (ONETOUCH VERIO FLEX SYSTEM) w/Device Does not apply Kit 1 kit, Does not apply, 2 times daily, May substitute per insurance formulary    Budesonide (PULMICORT FLEXHALER) 90 MCG/ACT Inhalation Aerosol Powder, Breath Activated 1 puff, Inhalation, 2 times daily PRN    buPROPion ER (WELLBUTRIN XL) 150 mg, Oral, Daily    clobetasol 0.05 % External Solution 1 mL, Topical, Daily as needed    Drospirenone-Ethinyl Estradiol (BETO) 3-0.02 MG Oral Tab 1 tablet, Oral, Daily    ergocalciferol (VITAMIN D2) 50,000 Units, Oral, Weekly    montelukast (SINGULAIR) 10  mg, Oral, Nightly    Phentermine HCl (ADIPEX-P) 37.5 mg, Oral, Before breakfast    potassium citrate 10 MEQ (1080 MG) Oral Tab CR 20 mEq, Oral, 2 times daily    prenatal vitamin w/DHA 27-0.8-228 MG Oral Cap 1 capsule, Daily    SUMAtriptan (IMITREX) 50 mg, Oral, Every 2 hour PRN        FAMILY HISTORY:  Family History   Problem Relation Age of Onset    Bipolar Disorder Mother     Diabetes Father     Glaucoma Father     Breast Cancer Maternal Grandmother 45    Other (bone cancer) Maternal Grandmother     Cancer Maternal Grandmother     No Known Problems Maternal Grandfather     Diabetes Paternal Grandmother     Heart Disease Paternal Grandfather     Blindness Paternal Grandfather     Heart Disease Maternal Aunt 40    Heart Disease Maternal Aunt 40    Breast Cancer Paternal Cousin 38        twin sisters (unsure of BRCA status)    Breast Cancer Paternal Cousin 38        twin sisters (unsure of BRCA status)    No Known Problems Brother          of meningitis    Breast Cancer Maternal Aunt     Dementia Maternal Aunt     Other (Other) Maternal Aunt         eye cancer     Heart Disease Maternal Aunt     Diabetes Paternal Aunt     Ovarian Cancer Neg     Colon Cancer Neg         SOCIAL HISTORY:  Social History     Socioeconomic History    Marital status:    Occupational History    Occupation: [employer]   Tobacco Use    Smoking status: Never     Passive exposure: Never    Smokeless tobacco: Never   Vaping Use    Vaping status: Never Used   Substance and Sexual Activity    Alcohol use: Not Currently    Drug use: Never    Sexual activity: Yes     Partners: Male     Birth control/protection: Vasectomy   Other Topics Concern    Blood Transfusions No    Caffeine Concern No    Stress Concern No    Weight Concern Yes    Special Diet No    Exercise No    Seat Belt No    Grew up on a farm No    History of tanning No    Outdoor occupation Yes    Breast feeding No    Reaction to local anesthetic No    Pt has a pacemaker No    Pt  has a defibrillator No   Social History Narrative    Live with  (Bryan)    No h/o abuse          PHYSICAL EXAMINATION:  There were no vitals filed for this visit.  CONSTITUTIONAL: No apparent distress, cooperative and communicative  NEUROLOGIC: Alert and oriented   HEAD: Normocephalic, atraumatic   EYES: Sclera non-icteric   ENT: Hearing intact, moist mucous membranes   NECK: No obvious goiter or masses   RESPIRATORY: Normal respiratory effort, Nonlabored breathing on room air  SKIN: No evident rashes   ABDOMEN: Soft, nontender, nondistended, no rebound tenderness, no guarding, no masses      REVIEW OF SYSTEMS:    A comprehensive 10-point review of systems was completed.  Pertinent positives and negatives are noted in the the HPI.       LABORATORY DATA:  Trichomonas Vaginalis STANISLAW  Negative Negative   HPV Source Cervical/endocervical     Chlamydia Trachomatis Amplified RNA  Negative Negative   Neisseria Gonorrhoeae Amplified RNA  Negative Negative   Chlam/GC Source Cervical/endocervical     IV Antigen Antibody Combo  Non-Reactive Non-Reactive           IMAGING REVIEW:  Narrative   PROCEDURE:  KIDNEY/BLADDER (CPT=76770)     COMPARISON: [facility],  KIDNEY/BLADDER (XTM=80807), 9/01/2023,     INDICATIONS: Nephrolithiasis     TECHNIQUE: Ultrasound examination was performed to visualize the kidneys and bladder.       FINDINGS:  RIGHT KIDNEY: Measures 10.2 cm.   No hydronephrosis, mass, calculus or perirenal fluid.    LEFT KIDNEY: Measures 11 cm.   No hydronephrosis, mass, calculus or perirenal fluid.    BLADDER: Bladder volume 116 mL. No visible wall thickening, mass, or calculus.       CONCLUSION:  1. Normal bilateral renal sonogram.  No significant change  2. Normal bladder sonogram.              DICTATED BY (CST): DAYANA JARVIS MD ON 12/02/2024 AT 3:47 PM      FINALIZED BY (CST): DAYANA JARVIS MD ON 12/02/2024 AT 3:48 PM              OTHER RELEVANT DATA:   none      IMPRESSION: History of nephrolithiasis currently on potassium citrate 20 mEq twice a day with follow-up renal bladder ultrasound demonstrating no nephrolithiasis.  I recommended a repeat 24 urine test to see if any changes in parameters after medical therapy.    Patient was counseled on stone prevention methods including hydration (until urine is clear), limiting salt and red meat/meat intake, eating a normal calcium diet, and drinking lemon with water. We also talked about reasons to go to the emergency room - namely acute flank pain associated with fevers, chills, nausea or vomiting.       PLAN:  Repeat 24 urine test  Potassium citrate 20 mEq twice a day refilled  Stone prevention  Renal bladder ultrasound in 1 year  Return to clinic after 24 urine testing    Thank you for referring this very pleasant patient to my clinic. If you have any questions or concerns, please do not hesitate to contact me.    Sincerely,  Meredith Cochran MD    30 minutes were spent on this patient at this visit obtaining a history, reviewing medical records, developing a treatment plan, counseling and discussing treatment strategy with patient, coordination of care and documentation.     The 21st Century Cures Act makes medical notes available to patients in the interest of transparency.  However, please be advised that this is a medical document.  It is intended as a peer to peer communication.  It is written in medical language and may contain abbreviations or verbiage that are technical and unfamiliar.  It may appear blunt or direct.  Medical documents are intended to carry relevant information, facts as evident, and the clinical opinion of the practitioner.         [1] No Known Allergies

## 2025-02-05 ENCOUNTER — OFFICE VISIT (OUTPATIENT)
Dept: GASTROENTEROLOGY | Facility: CLINIC | Age: 45
End: 2025-02-05

## 2025-02-05 VITALS
SYSTOLIC BLOOD PRESSURE: 124 MMHG | HEIGHT: 61 IN | DIASTOLIC BLOOD PRESSURE: 72 MMHG | WEIGHT: 180 LBS | BODY MASS INDEX: 33.99 KG/M2

## 2025-02-05 DIAGNOSIS — K64.9 HEMORRHOIDS, UNSPECIFIED HEMORRHOID TYPE: ICD-10-CM

## 2025-02-05 DIAGNOSIS — K21.9 GASTROESOPHAGEAL REFLUX DISEASE, UNSPECIFIED WHETHER ESOPHAGITIS PRESENT: Primary | ICD-10-CM

## 2025-02-05 DIAGNOSIS — K59.00 CONSTIPATION, UNSPECIFIED CONSTIPATION TYPE: ICD-10-CM

## 2025-02-05 DIAGNOSIS — K92.1 BLOOD IN THE STOOL: ICD-10-CM

## 2025-02-05 DIAGNOSIS — K76.0 FATTY LIVER: ICD-10-CM

## 2025-02-05 PROCEDURE — 3008F BODY MASS INDEX DOCD: CPT | Performed by: INTERNAL MEDICINE

## 2025-02-05 PROCEDURE — 3074F SYST BP LT 130 MM HG: CPT | Performed by: INTERNAL MEDICINE

## 2025-02-05 PROCEDURE — 3078F DIAST BP <80 MM HG: CPT | Performed by: INTERNAL MEDICINE

## 2025-02-05 PROCEDURE — 99215 OFFICE O/P EST HI 40 MIN: CPT | Performed by: INTERNAL MEDICINE

## 2025-02-05 NOTE — PATIENT INSTRUCTIONS
1. Hemorrhoids  2. Reflux  3. constipation    Recommend:  Ok to try water and fiber. Ok to add miralax and magnesium as needed    1. Use wet towelettes (Kleenix, Tucks, Júnior) to clean the anal area after bowel movements and then pat dry the area with dry toilet paper.  2.  DO NOT rub the area with dry toilet paper. Sitz baths can be taken as necessary (30 minutes soaking in a tub of tepid water once or twice a day for perianal burning and/or itching)  3. Apply the AnaMantle HC one or twice daily to the anal after a 30 minute sitz bath (sitting in a warm tub of water) for    Here are some reflux precautions:   - Avoid trigger foods  - Anti-reflux measures: raising the head of the bed at night, avoiding tight clothing or belts, avoiding eating late at night and not lying down shortly after mealtime and achieving weight loss   - Avoid NSAID's, caffeine, peppermints, alcohol, tobacco and foods that incite symptoms     Tums and famotidine ok for flare of acid reflux  Ok to do omeprazole short term for severe flare daily before breakfast for 7-10 days  Pelvic floor therapy for constipation    Due for colonoscopy early 2026======  -Schedule colonoscopy w/MAC for history of polyps  -Prep: suprep or trilyte or equivalent  -Diabetes meds: Hold metformin day of procedure and day before procedure. If patient is on other diabetic medications/insulin please ask primary or endocrine to manage pre-procedure and day of procedure    ** If MAC @ [facility]/[place]:    - HOLD ACE/ARBs the night before and the day of the procedure(s)    - NO alcohol, recreational drugs nor erectile dysfunction mediations 24 hours before procedure(s)   - NO herbal supplements or weight loss medications x 7 days prior to the procedure(s)-- 7 days prior to     ** [facility] - continue all medications as prescribed

## 2025-02-05 NOTE — PROGRESS NOTES
[facility] - Gastroenterology  Clinic Follow-up Visit    Chief Complaint   Patient presents with    Heartburn     Started to become more severe over the summer last year     Blood In Stool     Hasn't happen in a month; happened a few times; does have hemorrhoid         Subjective/HPI:   Annabella Live is a 44 year old female, patient of Dr. Dr. Boggs with history of hemorrhoids, constipation, who presents for blood in the stool and acid reflux    Past Visit(s):==========================================  2/6/2023    Annabella Live is a 44 year old female, seen in consultation by request or Dr. Boggs, with history of hemorrhoids and constipation and diarrhea.    Hemorrhoids since having kids  Has hemorrhoid flare in Oct  Then since then had stomach pain on FODMAP  Cut out dairy  Increased fiber to 30 gm  Increased water and still not going regularly  More constipation-- with some cramping and diarrhea and some mucusy stools  Then after the last BM had just blood once  Stool with white spots  Has oily stuff randomly  It can happen with what ever food  + dyspepsia  Patient denies any GI symptoms of nausea, vomiting, dysphagia, hematemesis, abdominal pain, change in bowel habits, thin stools, hematochezia, or melena.  Additionally there is no weight loss and no reported history of chest pain or shortness of breath.  8/2022 normal    Baby is 1 year old  Still  Nursing    Denies family history of colon cancer.  Constipation every 2-3 days. Now every day   Using preporation H suppository   denies adverse reaction to anesthesia  No know IBD in the family  Never done over the counter meds for constipation    ASSESSMENT AND PLAN  # Blood per rectum  # change in stools  # dyspepsia irregular bowels    Recommend:  - Discussed Hpylori test and celiac but wants EGD  -Schedule EGD/colonoscopy w/MAC sedation for hematochezia, change in stools, dyspepsia   -Prep: Split dose Colyte or equivalent    ** If MAC @ [facility]:     - NO alcohol, recreational drugs nor erectile dysfunction mediations 24 hours before procedure(s)   - NO herbal supplements or weight loss medications x 7 days prior to the procedure(s)    ** If MAC @ [facility] or IV twilight - continue all medications as prescribed    Need COVID test 72 hours before procedure      In office today, 02/05/25, pt presents for f/u.=====================================================  More constipation recently and a few months ago hemorrhoids worse  Tried drinks and fiber   Was losing weight and hair falling out and constipated   Lost 20 lbs so stopped now and gained weight back since having more symptoms    Overall, the patient feels ok. Cut back red meat   GI symptoms of abdominal pain, nausea, vomiting, change in bowel habits, dyspepsia, dysphagia, hematemesis, hematochezia, or melena. Patient has a bowel movement each day to every other day. Additionally there is no change of appetite, unexpected weight loss, and no reported history of chest pain or shortness of breath.    =========================================================  Pertinent Family Hx:  - denies  family hx of esophageal, gastric or colon cancer  - denies family history of IBD.    Prior endoscopies:  3/8/2023  EGD findings:       1. Esophagus: The squamocolumnar junction was noted at 40 cm and appeared regular. The GE junction was noted at 40 cm from the incisors. No significant hiatal hernia. The esophageal mucosa appeared normal. There was no evidence of esophagitis, stricture or endoscopic evidence of Juárez's esophagus.  2. Stomach: The stomach distended normally. Normal rugal folds were seen. The pylorus was patent. The gastric mucosa appeared erythematous with erosions so biopsied for Hpylori. Retroflexion revealed a normal fundus and a normal cardia.   3. Duodenum: The duodenal mucosa appeared normal in the 1st and 2nd portion of the duodenum. Biopsied to rule out celiac     Colonoscopy findings:     1. TEJAS:  normal rectal tone, no masses palpated.   2. 1 polyp(s) noted as follows:      A. 20 mm polyp in the sigmoid colon; peduculated morphology; hot snare polypectomy and retrieved. Clipped the base given high risk for bleeding  3. Terminal ileum: the visualized mucosa appeared normal. In the cecum the appendiceal orifice was prominent with pus and pillow sign. Pushed to expel pus and biopsied  4. The colonic mucosa throughout the colon showed normal vascular pattern, without evidence of angioectasias or inflammation.   5. Diverticulosis: none noted.  6. A retroflexed view of the rectum revealed hemorrhoids.        Impression:  Possible early appendicitis  Large colon polyp  Gastritis     Recommend:  Await pathology, likely repeat colonoscopy in 3 years pending pathology. The interval for the next colonoscopy will be determined after reviewing pathology. If new signs or symptoms develop, colonoscopy may need to be repeated sooner.   High fiber diet.  Monitor for blood in the stool. If having more than just tinge of blood, call office or go to the ER.  Here are some reflux precautions:   - Avoid trigger foods  - Anti-reflux measures: raising the head of the bed at night, avoiding tight clothing or belts, avoiding eating late at night and not lying down shortly after mealtime and achieving weight loss   - Avoid NSAID's, caffeine, peppermints, alcohol, tobacco and foods that incite symptoms   Follow up with surgery to evaluate for appendectomy     >>>If tissue was obtained and you have not received your pathology results either by phone or letter within 2 weeks, please call our office at [phone].     Specimens: gastric biopsies, duodenal biopsies, sigmoid polyp, random colon biopsies    Final Diagnosis:      A. Duodenum; biopsy:  Fragments of small intestinal/duodenal mucosa with preserved villous architecture.  No evidence of dysplasia or carcinoma identified.     B. Gastric; biopsy:  Severe chronic active  gastritis.  Diff-Quik stain (appropriate control reactivity) is positive for H pylori-like microorganisms.  No evidence of dysplasia or carcinoma identified.     C. Appendix orifice; biopsy:  Fragments of colonic mucosa with preserved glandular architecture, mild congestion and crushed lymphoid aggregates in the lamina propria.  No evidence of acute cryptitis, crypt abscess, dysplasia or carcinoma identified.     D. Random colon; biopsy:  Fragments of colonic mucosa with preserved glandular architecture and rare lymphoid aggregates in the lamina propria.  No evidence of acute cryptitis, crypt abscess, lymphocytic colitis, dysplasia or carcinoma identified.     E. Sigmoid colon polyp:   Tubular adenoma.    Cauterized inked resection margin is free of adenomatous change.       Social Hx:  - No smoking/etoh  - Denies illicit drug use   - NSAIDs/ASA use: PRN      History, Medications, Allergies, ROS:      Past Medical History:    Acne    Allergic rhinitis    Anesthesia complication    Anxiety    Asthma (HCC)    Calculus of kidney    Environmental allergies    Esophageal reflux    Starting having heartburn    Fatty liver    Gestational diabetes (HCC)    High cholesterol    History of anemia    History of migraine    Hyperlipidemia    Kidney stone    Migraines    Obesity    PONV (postoperative nausea and vomiting)    Recurrent kidney stones    Visual impairment      Past Surgical History:   Procedure Laterality Date    Colonoscopy N/A 2023    Procedure: COLONOSCOPY;  Surgeon: Fausto Cosme MD;  Location: [facility] ENDO    Cystoscopy,insert ureteral stent  2023      22    Other surgical history  2024    hysteroscopic currettage    [place] teeth removed      at age 18       Family History   Problem Relation Age of Onset    Bipolar Disorder Mother     Diabetes Father     Glaucoma Father     Breast Cancer Maternal Grandmother 45    Other (bone cancer) Maternal Grandmother     Cancer Maternal Grandmother      No Known Problems Maternal Grandfather     Diabetes Paternal Grandmother     Heart Disease Paternal Grandfather     Blindness Paternal Grandfather     Heart Disease Maternal Aunt 40    Heart Disease Maternal Aunt 40    Breast Cancer Paternal Cousin 38        twin sisters (unsure of BRCA status)    Breast Cancer Paternal Cousin 38        twin sisters (unsure of BRCA status)    No Known Problems Brother          of meningitis    Breast Cancer Maternal Aunt     Dementia Maternal Aunt     Other (Other) Maternal Aunt         eye cancer     Heart Disease Maternal Aunt     Diabetes Paternal Aunt     Ovarian Cancer Neg     Colon Cancer Neg       Social History:   Social History     Socioeconomic History    Marital status:    Occupational History    Occupation: [employer]   Tobacco Use    Smoking status: Never     Passive exposure: Never    Smokeless tobacco: Never   Vaping Use    Vaping status: Never Used   Substance and Sexual Activity    Alcohol use: Not Currently    Drug use: Never    Sexual activity: Yes     Partners: Male     Birth control/protection: Vasectomy   Other Topics Concern    Blood Transfusions No    Caffeine Concern No    Stress Concern No    Weight Concern Yes    Special Diet No    Exercise No    Seat Belt No    Grew up on a farm No    History of tanning No    Outdoor occupation Yes    Breast feeding No    Reaction to local anesthetic No    Pt has a pacemaker No    Pt has a defibrillator No   Social History Narrative    Live with  (Bryan)    No h/o abuse        Medications (Active prior to today's visit):  Current Outpatient Medications   Medication Sig Dispense Refill    potassium citrate 10 MEQ (1080 MG) Oral Tab CR Take 2 tablets (20 mEq total) by mouth 2 (two) times daily. 240 tablet 0    Drospirenone-Ethinyl Estradiol (BETO) 3-0.02 MG Oral Tab Take 1 tablet by mouth daily. 84 tablet 1    PHENTERMINE HCL 37.5 MG Oral Tab TAKE 1 TABLET(37.5 MG) BY MOUTH EVERY MORNING BEFORE BREAKFAST 30  tablet 0    buPROPion  MG Oral Tablet 24 Hr Take 1 tablet (150 mg total) by mouth daily. 30 tablet 0    SUMAtriptan 50 MG Oral Tab Take 1 tablet (50 mg total) by mouth every 2 (two) hours as needed for Migraine (max 2 doses per day). 9 tablet 2    BIOTIN OR Take 1 capsule by mouth daily.      clobetasol 0.05 % External Solution Apply 1 mL topically daily as needed. 60 mL 3    acidophilus-pectin Oral Cap Take 1 capsule by mouth daily.      Budesonide (PULMICORT FLEXHALER) 90 MCG/ACT Inhalation Aerosol Powder, Breath Activated Inhale 1 puff into the lungs 2 (two) times daily as needed. 2 each 2    montelukast 10 MG Oral Tab Take 1 tablet (10 mg total) by mouth nightly. 90 tablet 1    albuterol 108 (90 Base) MCG/ACT Inhalation Aero Soln Inhale 2 puffs into the lungs every 6 (six) hours as needed for Wheezing or Shortness of Breath. 1 each 0    prenatal vitamin w/DHA 27-0.8-228 MG Oral Cap Take 1 capsule by mouth daily.      Blood Glucose Monitoring Suppl (ONETOUCH VERIO FLEX SYSTEM) w/Device Does not apply Kit 1 kit by Does not apply route 2 (two) times daily. May substitute per insurance formulary 1 kit 0    ERGOCALCIFEROL 1.25 MG (51170 UT) Oral Cap TAKE 1 CAPSULE BY MOUTH 1 TIME A WEEK FOR 12 DOSES 12 capsule 0       Allergies:  Allergies[1]    ROS:   CONSTITUTIONAL:  negative for fevers, chills  EYES:  negative for change in vision  RESPIRATORY:  negative for  shortness of breath  CARDIOVASCULAR:  negative for  chest pain  GASTROINTESTINAL:  see HPI  GENITOURINARY:  negative for dysuria and hematuria   SKIN:  negative for  rash  ALLERGIC/IMMUNOLOGIC:  negative for hay fever allergies  ENDOCRINE:  negative for cold intolerance and heat intolerance  MUSCULOSKELETAL:  negative for  joint stiffness and joint swelling  BEHAVIOR/PSYCH:  negative for depressed mood    PHYSICAL EXAM:   Height 5' 1\" (1.549 m), weight 180 lb (81.6 kg), last menstrual period 12/19/2024, not currently breastfeeding.    Gen: patient  appears comfortable and in no acute distress  HEENT: conjunctiva pink, the sclera appears anicteric, oropharynx clear, mucus membranes appear moist  CV: the extremities are warm and well perfused   Lung: effort normal and breath sounds normal, no respiratory distress, wheezes or rales  GI: soft, non-tender exam in all quadrants without rigidity or guarding, non-distended, no abnormal bowel sounds noted, no masses are palpated  : no CVA tenderness  Skin: dry, warm, no jaundice  Ext: no cyanosis, clubbing or edema is evident.   Neuro: Alert and oriented x4, and patient is having movements of all 4 extremities   Psych: Pt has a normal mood and affect, behavior is normal    Nursing note and vitals reviewed      Labs/Imaging:     Patient's labs and imaging were reviewed and discussed with patient today.  See HPI and A&P for further details.     .  ASSESSMENT/PLAN:     1. Hemorrhoids  2. Reflux  3. constipation    Recommend:  Ok to try water and fiber. Ok to add miralax and magnesium as needed    1. Use wet towelettes (Kleenix, Tucks, Júnior) to clean the anal area after bowel movements and then pat dry the area with dry toilet paper.  2.  DO NOT rub the area with dry toilet paper. Sitz baths can be taken as necessary (30 minutes soaking in a tub of tepid water once or twice a day for perianal burning and/or itching)  3. Apply the AnaMantle HC one or twice daily to the anal after a 30 minute sitz bath (sitting in a warm tub of water) for    Here are some reflux precautions:   - Avoid trigger foods  - Anti-reflux measures: raising the head of the bed at night, avoiding tight clothing or belts, avoiding eating late at night and not lying down shortly after mealtime and achieving weight loss   - Avoid NSAID's, caffeine, peppermints, alcohol, tobacco and foods that incite symptoms     Tums and famotidine ok for flare of acid reflux  Ok to do omeprazole short term for severe flare daily before breakfast for 7-10  days  Pelvic floor therapy for constipation    Due for colonoscopy early 2026======  -Schedule colonoscopy w/MAC for history of polyps  -Prep: suprep or trilyte or equivalent  -Diabetes meds: Hold metformin day of procedure and day before procedure. If patient is on other diabetic medications/insulin please ask primary or endocrine to manage pre-procedure and day of procedure    ** If MAC @ [facility]:    - HOLD ACE/ARBs the night before and the day of the procedure(s)    - NO alcohol, recreational drugs nor erectile dysfunction mediations 24 hours before procedure(s)   - NO herbal supplements or weight loss medications x 7 days prior to the procedure(s)-- 7 days prior to     ** If MAC @ [facility] or IV twilit - continue all medications as prescribed        Colonoscopy consent: I have discussed the risks (including risk of delayed/missed diagnosis), benefits, delayed/missed diagnosis, and alternatives to colonoscopy with the patient [who demonstrated understanding], including but not limited to the risks of bleeding, infection, pain, as well as the risks of anesthesia and perforation all leading to prolonged hospitalization or surgical intervention. I also specifically mentioned the miss rate of colonoscopy of 5-10% in the best of all circumstances.  It is the patient's responsibility to contact his/her insurance company regarding questions about out-of-pocket cost/benefits and was provided the appropriate diagnostic information/codes.  All questions were answered to the patient’s satisfaction. The patient signed informed consent and elected to proceed with colonoscopy with intervention [i.e. polypectomy, stent placement, etc.] as indicated.        Orders This Visit:  No orders of the defined types were placed in this encounter.      Meds This Visit:  Requested Prescriptions      No prescriptions requested or ordered in this encounter       Imaging & Referrals:  None          [1] No Known Allergies

## 2025-02-21 ENCOUNTER — HOSPITAL ENCOUNTER (OUTPATIENT)
Dept: MAMMOGRAPHY | Age: 45
Discharge: HOME | End: 2025-02-21
Attending: OBSTETRICS & GYNECOLOGY
Payer: COMMERCIAL

## 2025-02-21 ENCOUNTER — PATIENT MESSAGE (OUTPATIENT)
Facility: CLINIC | Age: 45
End: 2025-02-21

## 2025-02-21 DIAGNOSIS — Z12.31 ENCOUNTER FOR SCREENING MAMMOGRAM FOR MALIGNANT NEOPLASM OF BREAST: ICD-10-CM

## 2025-02-21 PROCEDURE — 77063 BREAST TOMOSYNTHESIS BI: CPT | Performed by: OBSTETRICS & GYNECOLOGY

## 2025-02-21 PROCEDURE — 77067 SCR MAMMO BI INCL CAD: CPT | Performed by: OBSTETRICS & GYNECOLOGY

## 2025-03-03 ENCOUNTER — HOSPITAL ENCOUNTER (OUTPATIENT)
Dept: MAMMOGRAPHY | Facility: HOSPITAL | Age: 45
Discharge: HOME | End: 2025-03-03
Attending: OBSTETRICS & GYNECOLOGY
Payer: COMMERCIAL

## 2025-03-03 ENCOUNTER — HOSPITAL ENCOUNTER (OUTPATIENT)
Dept: ULTRASOUND IMAGING | Facility: HOSPITAL | Age: 45
Discharge: HOME | End: 2025-03-03
Attending: OBSTETRICS & GYNECOLOGY
Payer: COMMERCIAL

## 2025-03-03 ENCOUNTER — TELEPHONE (OUTPATIENT)
Dept: SURGERY | Facility: CLINIC | Age: 45
End: 2025-03-03

## 2025-03-03 DIAGNOSIS — R92.8 ABNORMAL MAMMOGRAM: ICD-10-CM

## 2025-03-03 PROCEDURE — 77065 DX MAMMO INCL CAD UNI: CPT | Performed by: OBSTETRICS & GYNECOLOGY

## 2025-03-03 PROCEDURE — 77061 BREAST TOMOSYNTHESIS UNI: CPT | Performed by: OBSTETRICS & GYNECOLOGY

## 2025-03-03 PROCEDURE — 76642 ULTRASOUND BREAST LIMITED: CPT | Performed by: OBSTETRICS & GYNECOLOGY

## 2025-03-04 ENCOUNTER — PATIENT MESSAGE (OUTPATIENT)
Dept: OBGYN CLINIC | Facility: CLINIC | Age: 45
End: 2025-03-04

## 2025-03-04 DIAGNOSIS — N63.10 MASS OF RIGHT BREAST, UNSPECIFIED QUADRANT: Primary | ICD-10-CM

## 2025-03-04 NOTE — TELEPHONE ENCOUNTER
Orders placed for right breast diagnostic mammogram and right breast ultrasound to be done in 6 months.

## 2025-03-06 NOTE — PROGRESS NOTES
Please call pt and inform her of results attached & order any follow up test recommended    CONCLUSION:     1. Probably benign sonographic mass in the right breast at 9 o'clock, 7 cm from the nipple.  A follow-up limited right breast ultrasound is recommended in 6 months to assess stability.     2. Probably benign focal asymmetry in the central right breast middle depth.  A follow-up diagnostic right breast mammogram is recommended in 6 months to assess stability.     3. Additional asymmetry noted in the outer right breast on the recent screening mammogram resolves on today's exam and is without a sonographic correlate consistent with a benign finding.     Additionally, please note that the patient is currently due for a bilateral breast MRI for follow-up of probably benign findings noted on the July 2024 study.     BI-RADS CATEGORY:     DIAGNOSTIC CATEGORY 3 - PROBABLY BENIGN FINDING.      RECOMMENDATIONS:   SHORT TERM FOLLOW-UP DIAGNOSTIC MAMMOGRAM RIGHT BREAST IN 6 MONTHS.      SHORT TERM FOLLOW-UP ULTRASOUND RIGHT BREAST IN 6 MONTHS.      Your patient's answers to the health and family history questions collected during this mammogram indicate a potentially increased risk for breast cancer. It is recommended that this patient be evaluated in our Cancer Risk Assessment Clinic to determine   eligibility for additional breast cancer screening, risk reduction strategies and/or genetic testing. Providers are encouraged to contact our breast navigator, Leah Dee, at [phone] with any questions or for guidance regarding this   recommendation.

## 2025-03-18 ENCOUNTER — TELEPHONE (OUTPATIENT)
Age: 45
End: 2025-03-18

## 2025-03-18 NOTE — TELEPHONE ENCOUNTER
Returning message received from patient regarding Cancer Risk Assessment Clinic. Message left for patient to please return call when able.

## 2025-03-20 NOTE — TELEPHONE ENCOUNTER
Patient returns call.  Reviewed with patient services offered in the Cancer Risk Assessment Clinic.  Patient's family history reviewed.  Shared with patient she does meet classic criteria to meet with Genetic Counselor to consider genetic testing.  Patient is agreeable.  Appointment scheduled for 3/27/25 at 2:15 with Khushi Cuevas.  All appointment information provided to patient.  She acknowledged and was appreciative of call.

## 2025-03-24 ENCOUNTER — HOSPITAL ENCOUNTER (OUTPATIENT)
Dept: MRI IMAGING | Facility: HOSPITAL | Age: 45
Discharge: HOME | End: 2025-03-24
Attending: FAMILY MEDICINE
Payer: COMMERCIAL

## 2025-03-24 DIAGNOSIS — R92.30 DENSE BREAST TISSUE ON MAMMOGRAM, UNSPECIFIED TYPE: ICD-10-CM

## 2025-03-24 DIAGNOSIS — Z80.3 FAMILY HISTORY OF BREAST CANCER: ICD-10-CM

## 2025-03-24 DIAGNOSIS — Z91.89 AT HIGH RISK FOR BREAST CANCER: ICD-10-CM

## 2025-03-24 DIAGNOSIS — R92.8 ABNORMAL MRI, BREAST: ICD-10-CM

## 2025-03-24 PROCEDURE — A9575 INJ GADOTERATE MEGLUMI 0.1ML: HCPCS | Performed by: FAMILY MEDICINE

## 2025-03-24 PROCEDURE — 77049 MRI BREAST C-+ W/CAD BI: CPT | Performed by: FAMILY MEDICINE

## 2025-03-24 RX ORDER — GADOTERATE MEGLUMINE 376.9 MG/ML
20 INJECTION INTRAVENOUS
Status: COMPLETED | OUTPATIENT
Start: 2025-03-24 | End: 2025-03-24

## 2025-03-24 RX ADMIN — GADOTERATE MEGLUMINE 18 ML: 376.9 INJECTION INTRAVENOUS at 13:06:00

## 2025-03-27 ENCOUNTER — OFFICE VISIT (OUTPATIENT)
Age: 45
End: 2025-03-27
Attending: OBSTETRICS & GYNECOLOGY
Payer: COMMERCIAL

## 2025-03-27 ENCOUNTER — NURSE ONLY (OUTPATIENT)
Age: 45
End: 2025-03-27
Attending: OBSTETRICS & GYNECOLOGY
Payer: COMMERCIAL

## 2025-03-27 DIAGNOSIS — Z80.3 FAMILY HISTORY OF MALIGNANT NEOPLASM OF BREAST: Primary | ICD-10-CM

## 2025-03-27 NOTE — PROGRESS NOTES
Reason for visit: Mrs. Live is a 45-year-old woman referred for genetic counseling due to a family history of early-onset breast cancer.  She was seen for genetic counseling and to discuss the option of genetic testing.  Mrs. Live is  and has a three year old son.    Referring MD: Rita Boggs, DO   Relevant family history: Mrs. Live shares a significant maternal family history of malignancies.  She shares that her maternal grandmother was diagnosed with breast cancer in her 30's and again with metastatic disease in her 60's and she passed away from this.  A maternal aunt was diagnosed with both an ocular cancer as well as breast cancer.  She is unaware of other malignancies on her maternal side of her family.  On her paternal side, two paternal first cousins once removed (identical twins to one another) were both diagnosed with breast cancer in their 30's.  She is unaware of any other malignancies on her paternal side of her family.  She is not aware of any family members having pursued genetic testing to date. She is of [place] and [place] heritage on her maternal side of her family and of [place] ancestry on her paternal side of her family and denies any consanguinity or Ashkenazi Alevism heritage.  Her son is healthy and well by her report.    Medical history: Mrs. Live started her breast imaging in her 30's and is being followed closely, both by mammogram and ultrasound due to dense breast tissue as well as by MRI.  She denies any current breast complaints. She denies any breast biopsies to date.  She was 15 at menarche and was 41 at the birth of her son.  She has her ovaries and her uterus intact.  She is pre-menopausal.  She has had a colonoscopy and a large polyp was removed. She denies any current dermatological concerns, thyroid issues or uterine fibroids.  She denies any use of hormone replacement therapy but admits to oral contraceptive use for 3 months.  She denies any tobacco  use or regular consumption of alcoholic beverages.  She considers herself to be in good health.  Summary:   We discussed hereditary breast cancer with Mrs. Live because of her family history.  Most breast cancer is not hereditary; however, hereditary cancer is suspected under certain conditions, such as when breast cancer occurs prior to the age of 50 (or premenopausal), when there are multiple affected family members, when breast cancer occurs in combination with other cancers, especially ovarian cancer, and when cancer appears to be passing from generation to generation.    We discussed dominant inheritance, the pattern in which most hereditary cancer conditions are inherited.  If an individual has such a cancer predisposing gene mutation, there is a 50% chance that any offspring will not inherit the mutation and a 50% chance that he or she will inherit it.  Inheriting the mutation does not automatically mean that one will develop cancer, rather that there is an increased chance for developing certain types of cancer.  Cancer predisposing gene mutations can exist in males and females and can be passed on to both male and female offspring.  The pros and cons of cancer predisposing gene mutation testing were reviewed with Mrs. Live.  Genetic test results can have significant impact on medical management, planning, screening, surgical decision-making, cancer risk assessment for the patient and relatives, and psychological/emotional well-being.  Mutations in the genes BRCA1 and BRCA2 account for most (but not all) cases of hereditary breast cancer.  Mutations in other genes have also been associated with an increased risk for breast cancer - but mutations in these other genes are statistically less often seen in hereditary breast cancer.    We briefly discussed the benefits and limitations of BRCA1/2 testing versus multi-gene panels that include BRCA1/2.  Panels are an appropriate option for individuals whose  history is suggestive of more than one syndrome, and they improve detection rate for identifying the underlying cause of a hereditary cancer.  Limitations of panels include an unknown percentage of variants of unknown significance, as well as an uncertainty of level of risk associated with certain unknown-penetrance genes, and therefore lack of clear guidelines for risk management of carriers of some of these mutations.  There are panels that assess for mutations in only “high risk” and clinically actionable breast cancer genes as well as larger panels that assess for mutations in high, moderate and unknown-penetrance breast cancer genes.  Genetic testing could yield one of three results: no mutation detected, deleterious mutation detected, or variant of uncertain significance.  The implications of these potential results were reviewed with Mrs. Live.  The optimal testing strategy is to test an affected family member first.  We discussed the limitations of interpreting test results of an unaffected individual.  Specifically, a negative result in an unaffected individual is uninformative unless a known mutation has been identified in an affected relative.  As the majority of her affected relatives are  or have chosen not to test to date, this is not an available option. Another option is testing Mrs. Live rather than an affected relative, and we reviewed the limitations of this testing, including concerns about discrimination by life insurers, long term healthcare or disability, which are not covered by statutes yet.   Given her family history of early-onset breast cancer on her maternal side, she does meet NCCN guidelines for genetic testing for HBOC genes.  Her paternal family history is also suggestive of an HBOC syndrome but the affected individuals are more distant.  After discussing the multiple testing options, Mrs. Live decided that she would like to pursue a multi-gene panel focused on  breast cancers ([facility] Hereditary Breast Cancer STAT Panel with LESTER and CHEK2, 9 genes).  The blood was drawn today and sent to [facility].  Turn-around-time is approximately two weeks for the testing.  She is aware that she can reflex to other genes if she is interested.  Our office will call Mrs. Live as soon as results are received; post-test counseling can be scheduled at that time.  She is interested in meeting with the High Risk Breast Cancer screening team as well and information for Dr. Lesly Stroud was shared with her.   Plan:  Ordered [facility] Hereditary Breast Cancer STAT Panel with LESTER and CHEK2 (9 genes) through [facility] (formerly [facility]).  The Genetics office will call Mrs. Live when results are received.  Post-test counseling can be scheduled at that time.  Recommendations for Mrs. Live and family members will depend on above test results.  Thank you for referring Mrs. Live for genetic counseling; please do not hesitate to contact our office if you have any questions or concerns, [phone].  Send to: Rita Boggs,    Time spent managing patient care: 60 minutes

## 2025-03-30 PROCEDURE — 84392 ASSAY OF URINE SULFATE: CPT

## 2025-03-30 PROCEDURE — 82507 ASSAY OF CITRATE: CPT

## 2025-03-30 PROCEDURE — 84300 ASSAY OF URINE SODIUM: CPT

## 2025-03-30 PROCEDURE — 82340 ASSAY OF CALCIUM IN URINE: CPT

## 2025-03-30 PROCEDURE — 83735 ASSAY OF MAGNESIUM: CPT

## 2025-03-30 PROCEDURE — 84133 ASSAY OF URINE POTASSIUM: CPT

## 2025-03-30 PROCEDURE — 83986 ASSAY PH BODY FLUID NOS: CPT

## 2025-03-30 PROCEDURE — 82436 ASSAY OF URINE CHLORIDE: CPT

## 2025-03-30 PROCEDURE — 81050 URINALYSIS VOLUME MEASURE: CPT

## 2025-03-30 PROCEDURE — 84560 ASSAY OF URINE/URIC ACID: CPT

## 2025-03-30 PROCEDURE — 83945 ASSAY OF OXALATE: CPT

## 2025-03-30 PROCEDURE — 84105 ASSAY OF URINE PHOSPHORUS: CPT

## 2025-03-31 ENCOUNTER — LAB ENCOUNTER (OUTPATIENT)
Dept: LAB | Facility: HOSPITAL | Age: 45
End: 2025-03-31
Attending: UROLOGY
Payer: COMMERCIAL

## 2025-03-31 DIAGNOSIS — N20.0 NEPHROLITHIASIS: ICD-10-CM

## 2025-04-03 ENCOUNTER — TELEPHONE (OUTPATIENT)
Age: 45
End: 2025-04-03

## 2025-04-03 NOTE — TELEPHONE ENCOUNTER
SHONDA for Randa with genetic testing results. 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 Testing was performed for [facility] Multi-Cancer Panel+RNA. Released results to her through lab's portal and will mail her a copy. Encouraged her to contact me with questions and shared contact information.

## 2025-04-05 LAB
AMMONIA, URINE: 23 MMOL/24 HR
CHLORIDE URINE: 184 MMOL/24 HR
CITRIC ACID(CITRATE): 1525 MG/24 HR
CREATININE, URINE: 1494 MG/24 HR
CREATININE/KG BODY WEIGHT, URINE: 18.8 MG/24 HR/KG
CREATININE/KG BODY WEIGHT, URINE: 18.8 MG/24 HR/KG
LC CALCIUM OXALATE SATURATION, URINE: 10.01
LC CALCIUM PHOSPHATE SATURATION, URINE: 1.42
LC CALCIUM, URINE: 331 MG/24 HR
LC CALCIUM/CREATININE RATIO, URINE: 222 MG/G CREAT
LC CALCIUM/KG BODY WEIGHT, URINE: 4.2 MG/24 HR/KG
MAGNESIUM, URINE: 81 MG/24 HR
OXALATES, URINE: 35 MG/24 HR
PH, 24 HR URINE: 6.29
PHOSPHORUS, URINE: 479 MG/24 HR
POTASSIUM, URINE: 79 MMOL/24 HR
PROTEIN CATABOLIC RATE, URINE: 0.8 G/KG/24 HR
PROTEIN CATABOLIC RATE, URINE: 0.8 G/KG/24 HR
SODIUM, URINE: 177 MMOL/24 HR
SULFATE, URINE: 25 MEQ/24 HR
UREA NITROGEN, URINE: 7.74 G/24 HR
UREA NITROGEN, URINE: 7.74 G/24 HR
URIC ACID SATURATION, URINE: 0.51
URIC ACID SATURATION, URINE: 0.51
URIC ACID, URINE: 796 MG/24 HR
URINE VOLUME (PRESERVATIVE): 1750 ML/24 HR

## 2025-04-12 ENCOUNTER — LAB ENCOUNTER (OUTPATIENT)
Dept: LAB | Facility: HOSPITAL | Age: 45
End: 2025-04-12
Attending: OBSTETRICS & GYNECOLOGY
Payer: COMMERCIAL

## 2025-04-12 DIAGNOSIS — N20.0 NEPHROLITHIASIS: ICD-10-CM

## 2025-04-12 DIAGNOSIS — E55.9 VITAMIN D DEFICIENCY: ICD-10-CM

## 2025-04-12 DIAGNOSIS — Z00.00 ENCOUNTER FOR ROUTINE ADULT HEALTH EXAMINATION WITHOUT ABNORMAL FINDINGS: ICD-10-CM

## 2025-04-12 DIAGNOSIS — E61.1 IRON DEFICIENCY: ICD-10-CM

## 2025-04-12 LAB
ALBUMIN SERPL-MCNC: 4.4 G/DL (ref 3.2–4.8)
ALBUMIN/GLOB SERPL: 2.1 {RATIO} (ref 1–2)
ALP LIVER SERPL-CCNC: 41 U/L (ref 37–98)
ALT SERPL-CCNC: 14 U/L (ref 10–49)
ANION GAP SERPL CALC-SCNC: 7 MMOL/L (ref 0–18)
AST SERPL-CCNC: 14 U/L (ref ?–34)
BASOPHILS # BLD AUTO: 0.04 X10(3) UL (ref 0–0.2)
BASOPHILS NFR BLD AUTO: 0.6 %
BILIRUB SERPL-MCNC: 0.4 MG/DL (ref 0.3–1.2)
BUN BLD-MCNC: 12 MG/DL (ref 9–23)
BUN/CREAT SERPL: 16.2 (ref 10–20)
CALCIUM BLD-MCNC: 9.1 MG/DL (ref 8.7–10.4)
CHLORIDE SERPL-SCNC: 105 MMOL/L (ref 98–112)
CHOLEST SERPL-MCNC: 237 MG/DL (ref ?–200)
CO2 SERPL-SCNC: 25 MMOL/L (ref 21–32)
CREAT BLD-MCNC: 0.74 MG/DL (ref 0.55–1.02)
DEPRECATED HBV CORE AB SER IA-ACNC: 22 NG/ML (ref 50–306)
DEPRECATED RDW RBC AUTO: 46.5 FL (ref 35.1–46.3)
EGFRCR SERPLBLD CKD-EPI 2021: 103 ML/MIN/1.73M2 (ref 60–?)
EOSINOPHIL # BLD AUTO: 0.12 X10(3) UL (ref 0–0.7)
EOSINOPHIL NFR BLD AUTO: 1.8 %
ERYTHROCYTE [DISTWIDTH] IN BLOOD BY AUTOMATED COUNT: 14.6 % (ref 11–15)
EST. AVERAGE GLUCOSE BLD GHB EST-MCNC: 111 MG/DL (ref 68–126)
FASTING PATIENT LIPID ANSWER: YES
FASTING STATUS PATIENT QL REPORTED: YES
GLOBULIN PLAS-MCNC: 2.1 G/DL (ref 2–3.5)
GLUCOSE BLD-MCNC: 97 MG/DL (ref 70–99)
HBA1C MFR BLD: 5.5 % (ref ?–5.7)
HCT VFR BLD AUTO: 37.6 % (ref 35–48)
HDLC SERPL-MCNC: 83 MG/DL (ref 40–59)
HGB BLD-MCNC: 12.1 G/DL (ref 12–16)
IMM GRANULOCYTES # BLD AUTO: 0.02 X10(3) UL (ref 0–1)
IMM GRANULOCYTES NFR BLD: 0.3 %
IRON SATN MFR SERPL: 18 % (ref 15–50)
IRON SERPL-MCNC: 71 UG/DL (ref 50–170)
LDLC SERPL CALC-MCNC: 124 MG/DL (ref ?–100)
LYMPHOCYTES # BLD AUTO: 2.48 X10(3) UL (ref 1–4)
LYMPHOCYTES NFR BLD AUTO: 37.8 %
MCH RBC QN AUTO: 28.6 PG (ref 26–34)
MCHC RBC AUTO-ENTMCNC: 32.2 G/DL (ref 31–37)
MCV RBC AUTO: 88.9 FL (ref 80–100)
MONOCYTES # BLD AUTO: 0.54 X10(3) UL (ref 0.1–1)
MONOCYTES NFR BLD AUTO: 8.2 %
NEUTROPHILS # BLD AUTO: 3.36 X10 (3) UL (ref 1.5–7.7)
NEUTROPHILS # BLD AUTO: 3.36 X10(3) UL (ref 1.5–7.7)
NEUTROPHILS NFR BLD AUTO: 51.3 %
NONHDLC SERPL-MCNC: 154 MG/DL (ref ?–130)
OSMOLALITY SERPL CALC.SUM OF ELEC: 284 MOSM/KG (ref 275–295)
PLATELET # BLD AUTO: 314 10(3)UL (ref 150–450)
POTASSIUM SERPL-SCNC: 4.6 MMOL/L (ref 3.5–5.1)
PROT SERPL-MCNC: 6.5 G/DL (ref 5.7–8.2)
RBC # BLD AUTO: 4.23 X10(6)UL (ref 3.8–5.3)
SODIUM SERPL-SCNC: 137 MMOL/L (ref 136–145)
TOTAL IRON BINDING CAPACITY: 392 UG/DL (ref 250–425)
TRANSFERRIN SERPL-MCNC: 332 MG/DL (ref 250–380)
TRIGL SERPL-MCNC: 177 MG/DL (ref 30–149)
VIT D+METAB SERPL-MCNC: 28 NG/ML (ref 30–100)
VLDLC SERPL CALC-MCNC: 31 MG/DL (ref 0–30)
WBC # BLD AUTO: 6.6 X10(3) UL (ref 4–11)

## 2025-04-12 PROCEDURE — 82728 ASSAY OF FERRITIN: CPT | Performed by: FAMILY MEDICINE

## 2025-04-12 PROCEDURE — 83540 ASSAY OF IRON: CPT | Performed by: FAMILY MEDICINE

## 2025-04-12 PROCEDURE — 83036 HEMOGLOBIN GLYCOSYLATED A1C: CPT | Performed by: FAMILY MEDICINE

## 2025-04-12 PROCEDURE — 80061 LIPID PANEL: CPT | Performed by: FAMILY MEDICINE

## 2025-04-12 PROCEDURE — 82306 VITAMIN D 25 HYDROXY: CPT | Performed by: FAMILY MEDICINE

## 2025-04-12 PROCEDURE — 85025 COMPLETE CBC W/AUTO DIFF WBC: CPT | Performed by: FAMILY MEDICINE

## 2025-04-12 PROCEDURE — 80053 COMPREHEN METABOLIC PANEL: CPT | Performed by: FAMILY MEDICINE

## 2025-04-12 PROCEDURE — 84466 ASSAY OF TRANSFERRIN: CPT | Performed by: FAMILY MEDICINE

## 2025-04-28 ENCOUNTER — OFFICE VISIT (OUTPATIENT)
Facility: CLINIC | Age: 45
End: 2025-04-28
Payer: COMMERCIAL

## 2025-04-28 ENCOUNTER — OFFICE VISIT (OUTPATIENT)
Dept: SURGERY | Facility: CLINIC | Age: 45
End: 2025-04-28
Payer: COMMERCIAL

## 2025-04-28 VITALS
BODY MASS INDEX: 35 KG/M2 | RESPIRATION RATE: 16 BRPM | TEMPERATURE: 97 F | HEART RATE: 79 BPM | WEIGHT: 183 LBS | SYSTOLIC BLOOD PRESSURE: 133 MMHG | DIASTOLIC BLOOD PRESSURE: 83 MMHG | OXYGEN SATURATION: 99 %

## 2025-04-28 DIAGNOSIS — Z80.3 FAMILY HISTORY OF BREAST CANCER: Primary | ICD-10-CM

## 2025-04-28 DIAGNOSIS — R92.8 ABNORMAL MAMMOGRAM: ICD-10-CM

## 2025-04-28 DIAGNOSIS — Z91.89 AT HIGH RISK FOR BREAST CANCER: ICD-10-CM

## 2025-04-28 DIAGNOSIS — R92.30 DENSE BREAST TISSUE: ICD-10-CM

## 2025-04-28 DIAGNOSIS — N20.0 NEPHROLITHIASIS: Primary | ICD-10-CM

## 2025-04-28 PROCEDURE — 99213 OFFICE O/P EST LOW 20 MIN: CPT

## 2025-04-28 PROCEDURE — 99214 OFFICE O/P EST MOD 30 MIN: CPT | Performed by: UROLOGY

## 2025-04-28 PROCEDURE — 3079F DIAST BP 80-89 MM HG: CPT

## 2025-04-28 PROCEDURE — 3075F SYST BP GE 130 - 139MM HG: CPT

## 2025-04-28 NOTE — PROGRESS NOTES
Meredith Cochran MD  Department of Urology  [address] [place] [address]., [address]  [place] [address]    T: [phone]  F: [phone]    To: Rita Boggs DO   [address] [facility] [address]  [facility] [address]    Re: Annabella Live   MRN: [id number]  : 3/3/1980    Dear Rita Boggs DO,    Today I had the pleasure of seeing Annabella Live in my clinic. As you know, Ms. Live is a pleasant 45 year old year old female who I am seeing for followup. Patient was last seen in this department on 2025.    Briefly, patient has followed with me for nephrolithiasis since 2022.  In May 2023 she underwent left ureteroscopy laser lithotripsy.  Please note the stone was severely impacted.  Stone analysis did return calcium oxalate dihydrate 100%.  She had a known stone on the right side which was being monitored.     She developed a stone on the right side that required ureteroscopy on 2023.  All of the stones were treated and her stent was placed on the string.  Her stent was removed by nursing.     She had a renal bladder ultrasound that demonstrated no abnormalities 16 weeks later.     She also underwent 24-hour urine testing but the collection was is slight undercollection and therefore she repeated her 24-hour urine testing.  Some of the values are still pending.     24 Hour Metabolic Urine Study Results     Creatinine:-Pending  Normal 18-20 mg/kilogram/day (female)  Normal 20-22 mg/kilogram/day (male)  Cystinuria present? No  Volume: 2000 mL/day-slightly low  Goal >2500  Calcium: 240 mg/day  Goal <250  Oxalate:-21  Goal <40  Citrate:-1017  Goal >450  Sodium: 120 mmol/day   Goal <150  pH: 6.5  Goal 5.8 - 6.2 (>6 for UA stones, >7 for cystine stone)  Uric acid: 388 mg/day  Goal <750     Last stone analysis: Calcium oxalate dihydrate, hydroxyapatite  Serum calcium: 9.3  PTH: 58.3  Serum uric acid: No value     Plan at last visit on 2023 was to return to clinic in 1 year with renal bladder  ultrasound prior.  We also recommended stone prevention mechanisms.  She ultimately decided to start potassium citrate.  She is on 20 mEq twice a day.  She has not completed a repeat 24 urine test.     I saw her on 1/21/2025 and plan was to repeat the 24-hour urine test after being on potassium citrate 20 mEq twice a day.    She completed the 24-hour urine testing on March 30, 2025 which is summarized below      24 Hour Metabolic Urine Study Results     Creatinine: 1494 mg/day   Normal 18-20 mg/kilogram/day (female)   Normal 20-22 mg/kilogram/day (male)   Cystinuria present? No   Volume: 1750 mL/day - LOW   Goal >2500   Calcium: 331 mg/day - high   Goal <250   Oxalate: 35 mg/day   Goal <40   Citrate: 1525 mg/day - excellent   Goal >450   Sodium: 177 mmol/day - high    Goal <150   pH: 6.2   Goal 5.8 - 6.2 (>6 for UA stones, >7 for cystine stone)   Uric acid: 796 mg/day - high   Goal <750     Last stone analysis: Calcium oxalate dihydrate, hydroxyapatite   Serum calcium: 9.3   PTH: 58.3   Serum uric acid: No value      PAST MEDICAL HISTORY:  Past Medical History[1]     PAST SURGICAL HISTORY:  Past Surgical History[2]      ALLERGIES:  Allergies[3]      MEDICATIONS:  Current Outpatient Medications   Medication Instructions    acidophilus-pectin Oral Cap 1 capsule, Daily    albuterol 108 (90 Base) MCG/ACT Inhalation Aero Soln 2 puffs, Inhalation, Every 6 hours PRN    BIOTIN OR 1 capsule, Daily    Blood Glucose Monitoring Suppl (ONETOUCH VERIO FLEX SYSTEM) w/Device Does not apply Kit 1 kit, Does not apply, 2 times daily, May substitute per insurance formulary    Budesonide (PULMICORT FLEXHALER) 90 MCG/ACT Inhalation Aerosol Powder, Breath Activated 1 puff, Inhalation, 2 times daily PRN    buPROPion ER (WELLBUTRIN XL) 150 mg, Oral, Daily    clobetasol 0.05 % External Solution 1 mL, Topical, Daily as needed    Drospirenone-Ethinyl Estradiol (BETO) 3-0.02 MG Oral Tab 1 tablet, Oral, Daily    montelukast (SINGULAIR) 10 mg,  Oral, Nightly    Phentermine HCl (ADIPEX-P) 37.5 mg, Oral, Before breakfast    potassium citrate 10 MEQ (1080 MG) Oral Tab CR 20 mEq, Oral, 2 times daily    prenatal vitamin w/DHA 27-0.8-228 MG Oral Cap 1 capsule, Daily    SUMAtriptan (IMITREX) 50 mg, Oral, Every 2 hour PRN        FAMILY HISTORY:  Family History[4]     SOCIAL HISTORY:  Short Social Hx on File[5]       PHYSICAL EXAMINATION:  There were no vitals filed for this visit.  CONSTITUTIONAL: No apparent distress, cooperative and communicative  NEUROLOGIC: Alert and oriented   HEAD: Normocephalic, atraumatic   EYES: Sclera non-icteric   ENT: Hearing intact, moist mucous membranes   NECK: No obvious goiter or masses   RESPIRATORY: Normal respiratory effort, Nonlabored breathing on room air  SKIN: No evident rashes   ABDOMEN: Soft, nontender, nondistended, no rebound tenderness, no guarding, no masses      REVIEW OF SYSTEMS:    A comprehensive 10-point review of systems was completed.  Pertinent positives and negatives are noted in the the HPI.       LABORATORY DATA:  4 Hour Metabolic Urine Study Results     Creatinine: 1494 mg/day   Normal 18-20 mg/kilogram/day (female)   Normal 20-22 mg/kilogram/day (male)   Cystinuria present? No   Volume: 1750 mL/day - LOW   Goal >2500   Calcium: 331 mg/day - high   Goal <250   Oxalate: 35 mg/day   Goal <40   Citrate: 1525 mg/day - excellent   Goal >450   Sodium: 177 mmol/day - high    Goal <150   pH: 6.2   Goal 5.8 - 6.2 (>6 for UA stones, >7 for cystine stone)   Uric acid: 796 mg/day - high   Goal <750     Last stone analysis: Calcium oxalate dihydrate, hydroxyapatite   Serum calcium: 9.3   PTH: 58.3   Serum uric acid: No value         IMAGING REVIEW:  Narrative   PROCEDURE: MRI BREAST (W+WO) W/CAD BILAT (CPT=77049)     COMPARISON: [facility], MRI BREAST (W+WO) W/CAD BILAT (CPT=77049), 7/07/2024, 3:25 PM.     INDICATIONS: R92.8 Abnormal MRI, breast Z80.3 Family history of breast cancer Z91.89 At high risk  for breast cancer R92.30 Dense breast tissue on mammogram, unspecified typ*     BREAST COMPOSITION: Category C - The breasts are heterogeneously dense, which may obscure small masses.     TECHNIQUE: Breast MRI was performed using dynamic intravenous gadolinium infusion, thin sections, and a dedicated breast coil.  Contrast enhancement analysis was assisted by computer-aided detection.       ENHANCEMENT PATTERN:   None or minimal, with <25% of glandular tissue demonstrating enhancement.    FINDINGS:     LEFT BREAST: There is no suspicious mass, abnormal enhancement or significant abnormality. There are a few small foci of progressive enhancement scattered throughout the breast and these are judged to be benign.  Specifically, previously seen non-mass  enhancement in the inferior posterior central breast is not appreciated on today's study.     RIGHT BREAST: There is no suspicious mass, abnormal enhancement or significant abnormality. There are a few small foci of progressive enhancement scattered throughout the breast and these are judged to be benign.  Specifically, two enhancing foci in the  inferior posterior central right breast are not appreciated on today's study.     AXILLAE: No abnormalities are seen in either axillary region.               Impression   CONCLUSION:  There is no MRI evidence of malignancy in either breast.  Annual screening breast MRI in conjunction with screening mammography at alternating six month intervals in recommended due to patient's high risk status.     BI-RADS CATEGORY:  DIAGNOSTIC CATEGORY 2 - BENIGN FINDING:       RECOMMENDATIONS:  ROUTINE MAMMOGRAM AND CLINICAL EVALUATION IN 12 MONTHS.       PLEASE NOTE: A NORMAL MRI DOES NOT EXCLUDE THE POSSIBILITY OF BREAST CANCER.  A CLINICALLY SUSPICIOUS PALPABLE LUMP SHOULD BE BIOPSIED.                         Dictated by (CST): Rosalino Garnett DO on 3/25/2025 at 2:52 PM      Finalized by (CST): Rosalino Garnett DO on 3/25/2025 at  3:02 PM       Narrative   PROCEDURE: US KIDNEY/BLADDER (CPT=76770)     COMPARISON: [facility],  KIDNEY/BLADDER (IAX=[id number]), 9/01/2023,     INDICATIONS: Nephrolithiasis     TECHNIQUE: Ultrasound examination was performed to visualize the kidneys and bladder.       FINDINGS:  RIGHT KIDNEY: Measures 10.2 cm.   No hydronephrosis, mass, calculus or perirenal fluid.    LEFT KIDNEY: Measures 11 cm.   No hydronephrosis, mass, calculus or perirenal fluid.    BLADDER: Bladder volume 116 mL. No visible wall thickening, mass, or calculus.       CONCLUSION:  1. Normal bilateral renal sonogram.  No significant change  2. Normal bladder sonogram.              DICTATED BY (CST): DAYANA JARVIS MD ON 12/02/2024 AT 3:47 PM      FINALIZED BY (CST): DAYANA JARVIS MD ON 12/02/2024 AT 3:48 PM                 OTHER RELEVANT DATA:   none     IMPRESSION:  History of nephrolithiasis currently on potassium citrate 20 mEq twice a day with follow-up renal bladder ultrasound demonstrating no nephrolithiasis. Repeat 24h urine with low volume, high citrate, high calcium and sodium as well as uric acid.     Patient was counseled on stone prevention methods including hydration (until urine is clear), limiting salt and red meat/meat intake, eating a normal calcium diet, and drinking lemon with water. We also talked about reasons to go to the emergency room - namely acute flank pain associated with fevers, chills, nausea or vomiting.    Patient also with gene (MET) that apparently increases the risk of kidney cancer-we will look for any kidney masses on her ultrasound as well     PLAN:  Diet control  Repeat 24 urine testing and ultrasound in 6 months  Return to clinic in 6 months or sooner    Thank you for referring this very pleasant patient to my clinic. If you have any questions or concerns, please do not hesitate to contact me.    Sincerely,  Meredith Cochran MD    30 minutes were spent on this patient at  this visit obtaining a history, reviewing medical records, developing a treatment plan, counseling and discussing treatment strategy with patient, coordination of care and documentation.     The  Century Cures Act makes medical notes available to patients in the interest of transparency.  However, please be advised that this is a medical document.  It is intended as a peer to peer communication.  It is written in medical language and may contain abbreviations or verbiage that are technical and unfamiliar.  It may appear blunt or direct.  Medical documents are intended to carry relevant information, facts as evident, and the clinical opinion of the practitioner.         [1]   Past Medical History:   Acne    Allergic rhinitis    Anesthesia complication    Anxiety    Asthma (HCC)    BRCA gene mutation negative in female    Negative for 70 gene panel+RNA aside from VUS in MET. Results in media tab    Calculus of kidney    Environmental allergies    Esophageal reflux    Starting having heartburn    Fatty liver    Gestational diabetes (HCC)    High cholesterol    History of anemia    History of migraine    Hyperlipidemia    Kidney stone    Migraines    Obesity    PONV (postoperative nausea and vomiting)    Recurrent kidney stones    Visual impairment   [2]   Past Surgical History:  Procedure Laterality Date    Colonoscopy N/A 2023    Procedure: COLONOSCOPY;  Surgeon: Fausto Cosme MD;  Location: [facility] ENDO    Cystoscopy,insert ureteral stent  2023      22    Other surgical history  2024    hysteroscopic currettage    Diamondhead teeth removed      at age 18    [3] No Known Allergies  [4]   Family History  Problem Relation Age of Onset    Diabetes Father     Glaucoma Father     Bipolar Disorder Mother     Breast Cancer Maternal Grandmother 35        dx metastatic at 60    Other (bone cancer) Maternal Grandmother     Cancer Maternal Grandmother 35        breast; 2nd dx 60    No Known Problems  Maternal Grandfather     Diabetes Paternal Grandmother     Heart Disease Paternal Grandfather     Blindness Paternal Grandfather     No Known Problems Brother          of meningitis    Heart Disease Maternal Aunt 40    Heart Disease Maternal Aunt 40    Cancer Maternal Aunt         ocular and breast    Breast Cancer Maternal Aunt     Dementia Maternal Aunt     Other (Other) Maternal Aunt         eye cancer     Heart Disease Maternal Aunt     Diabetes Paternal Aunt     Cancer Paternal Cousin 35        breast    Breast Cancer Paternal Cousin 38        twin sisters (unsure of BRCA status)    Cancer Paternal Cousin 35        breast    Breast Cancer Paternal Cousin 38        twin sisters (unsure of BRCA status)    Ovarian Cancer Neg     Colon Cancer Neg    [5]   Social History  Socioeconomic History    Marital status:    Occupational History    Occupation: [employer]   Tobacco Use    Smoking status: Never     Passive exposure: Never    Smokeless tobacco: Never   Vaping Use    Vaping status: Never Used   Substance and Sexual Activity    Alcohol use: Not Currently    Drug use: Never    Sexual activity: Yes     Partners: Male     Birth control/protection: Vasectomy   Other Topics Concern    Blood Transfusions No    Caffeine Concern No    Stress Concern No    Weight Concern Yes    Special Diet No    Exercise No    Seat Belt No    Grew up on a farm No    History of tanning No    Outdoor occupation Yes    Breast feeding No    Reaction to local anesthetic No    Pt has a pacemaker No    Pt has a defibrillator No   Social History Narrative    Live with  (Bryan)    No h/o abuse

## 2025-04-30 ENCOUNTER — TELEPHONE (OUTPATIENT)
Dept: OBGYN CLINIC | Facility: CLINIC | Age: 45
End: 2025-04-30

## 2025-04-30 NOTE — TELEPHONE ENCOUNTER
Annabella calling, her brother and nephew were born w/o spleens. She is asking if we have a noted documentation that it is present. She spoke to [facility], they directed her back to our office. RN reviewed [facility] ultrasound reports, and there is no mention of the spleen. Patient informed will call [facility] and request their providers review imagines to see if Spleen present and let patient know.     Spoke with RN with [facility], spoke to ultrasound tech. Hard to see spleen in fetus. They do not focus on area during anatomy scan. Recommendations would be for Annabella to reach out to ped for ultrasound of spleen currently.     Annabella called and informed of recommendations. States understanding.

## 2025-04-30 NOTE — PROGRESS NOTES
High Risk Breast Cancer Screening [facility] Prevention [facility]    History of Present Illness:  This is the first visit for this 45 year old woman, referred by Dr. Plascencia, who presents for breast cancer risk evaluation related to her family history, which is detailed below.  She has complaints of occasional right breast pain.  She denies specific breast lumps, nipple discharge, skin changes or other problems.  She has not had a prior history of breast disease or breast biopsy.  Most recent imaging was a high risk screening MRI on 2025 which showed no evidence of malignancy bilaterally.  She underwent a screening mammogram in  and was called back for additional views for a focal asymmetry in the right breast.  On additional views she was found to have a small hypoechoic mass at the 9 o'clock position 7 cm from the nipple measuring 5 mm which appears to be probably benign.  A 6-month follow-up mammogram and ultrasound was recommended.  She is here today for further recommendations.  Past Medical History:  Past Medical History[1]    Past Surgical History:    Past Surgical History[2]    Gynecological History:  Pt is a   Pt was 41 years old at time of first pregnancy.    She has cumulative breastfeeding history of 13 months.  She achieved menarche at age 15 and LMP 2025  She denies any history of hormone replacement therapy  She has history of oral contraceptive use for 4 months, currently taking   She denies infertility treatment to achieve pregnancy.    Medications:    Current Medications[3]    Allergies:    Allergies[4]    Family History:  Family History[5]  She is not of Ashkenazi Buddhist ancestry.    Social History:  History   Alcohol Use Not Currently       History   Smoking Status    Never   Smokeless Tobacco    Never     Ms. Annabella Live is  with 1 children. She has 2 siblings. She is currently Homemaker      Review of Systems:  General: The patient denies, fever, chills, night  sweats, fatigue, generalized weakness, change in appetite or weight loss.    HEENT:     The patient denies eye irritation, cataracts, redness, glaucoma, yellowing of the eyes, change in vision, color blindness, or +wears contacts/glasses. The patient denies hearing loss, ringing in the ears, ear drainage, earaches, nasal congestion, nose bleeds, snoring, pain in mouth/throat, hoarseness, change in voice, facial trauma.    Respiratory:  The patient denies chronic cough, phlegm, hemoptysis, pleurisy/chest pain, pneumonia, asthma, wheezing, difficulty in breathing with exertion, emphysema, chronic bronchitis, shortness of breath or abnormal sound when breathing.     Cardiovascular:  There is no history of chest pain, chest pressure/discomfort, palpitations, irregular heartbeat, fainting or near-fainting, difficulty breathing when lying flat, SOB/Coughing at night, swelling of the legs or chest pain while walking.    Breasts:  See history of present illness    Gastrointestinal:     There is no history of difficulty or pain with swallowing, reflux symptoms, vomiting, dark or bloody stools, constipation, yellowing of the skin, indigestion, nausea, change in bowel habits, diarrhea, abdominal pain or vomiting blood.     Genitourinary:  The patient denies frequent urination, needing to get up at night to urinate, urinary hesitancy or retaining urine, painful urination, urinary incontinence, decreased urine stream, blood in the urine or vaginal/penile discharge.    Skin:    The patient denies rash, itching, skin lesions, dry skin, change in skin color or change in moles.     Hematologic/Lymphatic:  The patient denies easily bruising or bleeding or persistent swollen glands or lymph nodes.     Musculoskeletal:  The patient denies muscle aches/pain, joint pain, stiff joints, neck pain, back pain or bone pain.    Neuropsychiatric:  There is no history of +migraines or severe headaches, seizure/epilepsy, speech problems,  coordination problems, trembling/tremors, fainting/black outs, dizziness, memory problems, loss of sensation/numbness, problems walking, weakness, tingling or burning in hands/feet. There is no history of abusive relationship, bipolar disorder, sleep disturbance, anxiety, depression or feeling of despair.    Endocrine:    There is no history of poor/slow wound healing, weight loss/gain, fertility or hormone problems, cold intolerance, thyroid disease.     Allergic/Immunologic:  There is no history of hives, hay fever, angioedema or anaphylaxis.    /83 (BP Location: Left arm, Patient Position: Sitting, Cuff Size: adult)   Pulse 79   Temp 97 °F (36.1 °C) (Temporal)   Resp 16   Wt 83 kg (183 lb)   LMP 02/07/2025 (Approximate)   SpO2 99%   BMI 34.58 kg/m²     Physical Exam:  The patient is an alert, oriented, well-nourished and  well-developed woman who appears her stated age. Her speech patterns and movements are normal. Her affect is appropriate.    HEENT: The head is normocephalic. The neck is supple. The thyroid is not enlarged and is without palpable masses/nodules. There are no palpable masses. The trachea is in the midline. Conjunctiva are clear, non-icteric.    Chest: The chest expands symmetrically. The lungs are clear to auscultation.    Heart: The rhythm is regular.  There are no murmurs, rubs, gallops or thrills.    Breasts:  Her breasts are symmetrical with a cup size 38DD.  Right breast:: The skin, nipple ,and areola appear normal. There is no skin dimpling with movement of the pectoralis. There is no nipple retraction. No nipple discharge can be elicited. The parenchyma is mildly nodular. There are no dominant masses in the breast. The axillary tail is normal.  Left breast:   The skin, nipple, and areola appear normal. There is no skin dimpling with movement of the pectoralis. There is no nipple retraction. No nipple discharge can be elicited. The parenchyma is mildly nodular. There are no  dominant masses in the breast. The axillary tail is normal.    Abdomen:  The abdomen is soft, flat and non tender. The liver is not enlarged. There are no palpable masses.    Lymph Nodes:  The supraclavicular, axillary and cervical regions are free of significant lymphadenopathy.    Back: There is no vertebral column tenderness.    Skin: The skin appears normal. There are no suspicious appearing rashes or lesions.    Extremities: The extremities are without deformity, cyanosis or edema.      Assessment:  45 year old year old female with an increased risk for breast cancer based on multiple risk assessment models.    Discussion and Plan:  The patient has no clinical or radiographic evidence of malignancy on exam today. I explained her breast cancer risk estimates to her and answered questions she had pertaining to her level of risk.  The patient's current five-year risk for breast cancer is elevated when compared to her peer group. We discussed factors that would further increase her risk for breast cancer over time to include age, future breast biopsy, as well as additional family members that may be diagnosed with breast cancer.     We then turned our discussion towards genetic counseling.  Patient has undergone genetic counseling is found to be negative.    We then talked about surveillance and I recommended semiannual breast exams as well as continuing with annual mammography.  At this point, I recommend annual breast MRI, as Annabella Live does have a lifetime risk of breast cancer >20% as per ACS recommendations.       With regard to risk-reducing interventions, we discussed lifestyle modifications including attention to diet, exercise, weight control, moderation in alcohol use, and avoidance of long-term hormone replacement therapy in the future.  We also discussed the benefit of a cumulative time of eighteen months or more of breastfeeding.    The patient was given ample opportunity for questions, and  those questions were answered to her satisfaction.  She will be due for a bilateral diagnostic mammogram in 2025.  Will plan for a high risk MRI in 2026.  She should follow-up annually in the high-risk clinic.  I will be in touch with her after the results of her imaging are available.. She has been encouraged to contact the office with any questions/concerns prior to her next appointment.    I discussed Ms. Annabella Live's breast cancer risk evaluation with her over a 30 minute encounter, more than 50% of which was devoted to the discussion of management options.        [1]   Past Medical History:   Acne    Allergic rhinitis    Anesthesia complication    Anxiety    Asthma (HCC)    BRCA gene mutation negative in female    Negative for 70 gene panel+RNA aside from VUS in MET. Results in media tab    Calculus of kidney    Environmental allergies    Esophageal reflux    Starting having heartburn    Fatty liver    Gestational diabetes (HCC)    High cholesterol    History of anemia    History of migraine    Hyperlipidemia    Kidney stone    Migraines    Obesity    PONV (postoperative nausea and vomiting)    Recurrent kidney stones    Visual impairment   [2]   Past Surgical History:  Procedure Laterality Date    Colonoscopy N/A 2023    Procedure: COLONOSCOPY;  Surgeon: Fausto Cosme MD;  Location: [facility] ENDO    Cystoscopy,insert ureteral stent  2023      22    Other surgical history  2024    hysteroscopic currettage    [place] teeth removed      at age 18    [3]    potassium citrate 10 MEQ (1080 MG) Oral Tab CR Take 2 tablets (20 mEq total) by mouth 2 (two) times daily. 240 tablet 0    Drospirenone-Ethinyl Estradiol (BETO) 3-0.02 MG Oral Tab Take 1 tablet by mouth daily. 84 tablet 1    PHENTERMINE HCL 37.5 MG Oral Tab TAKE 1 TABLET(37.5 MG) BY MOUTH EVERY MORNING BEFORE BREAKFAST 30 tablet 0    buPROPion  MG Oral Tablet 24 Hr Take 1 tablet (150 mg total) by mouth  daily. 30 tablet 0    SUMAtriptan 50 MG Oral Tab Take 1 tablet (50 mg total) by mouth every 2 (two) hours as needed for Migraine (max 2 doses per day). 9 tablet 2    BIOTIN OR Take 1 capsule by mouth in the morning.      clobetasol 0.05 % External Solution Apply 1 mL topically daily as needed. 60 mL 3    acidophilus-pectin Oral Cap Take 1 capsule by mouth in the morning.      Budesonide (PULMICORT FLEXHALER) 90 MCG/ACT Inhalation Aerosol Powder, Breath Activated Inhale 1 puff into the lungs 2 (two) times daily as needed. 2 each 2    montelukast 10 MG Oral Tab Take 1 tablet (10 mg total) by mouth nightly. 90 tablet 1    albuterol 108 (90 Base) MCG/ACT Inhalation Aero Soln Inhale 2 puffs into the lungs every 6 (six) hours as needed for Wheezing or Shortness of Breath. 1 each 0    prenatal vitamin w/DHA 27-0.8-228 MG Oral Cap Take 1 capsule by mouth in the morning.      Blood Glucose Monitoring Suppl (ONETOUCH VERIO FLEX SYSTEM) w/Device Does not apply Kit 1 kit by Does not apply route 2 (two) times daily. May substitute per insurance formulary 1 kit 0   [4] No Known Allergies  [5]   Family History  Problem Relation Age of Onset    Diabetes Father     Glaucoma Father     Bipolar Disorder Mother     Breast Cancer Maternal Grandmother 35        dx metastatic at 60    Other (bone cancer) Maternal Grandmother     Cancer Maternal Grandmother 35        breast; 2nd dx 60    No Known Problems Maternal Grandfather     Diabetes Paternal Grandmother     Heart Disease Paternal Grandfather     Blindness Paternal Grandfather     No Known Problems Brother          of meningitis    Heart Disease Maternal Aunt 40    Heart Disease Maternal Aunt 40    Cancer Maternal Aunt         ocular and breast    Breast Cancer Maternal Aunt     Dementia Maternal Aunt     Other (Other) Maternal Aunt         eye cancer     Heart Disease Maternal Aunt     Diabetes Paternal Aunt     Cancer Paternal Cousin 35        breast    Breast Cancer Paternal  Cousin 38        twin sisters (unsure of BRCA status)    Cancer Paternal Cousin 35        breast    Breast Cancer Paternal Cousin 38        twin sisters (unsure of BRCA status)    Ovarian Cancer Neg     Colon Cancer Neg

## 2025-04-30 NOTE — TELEPHONE ENCOUNTER
Patient calling to speak with nurse to ask and ensure that her son was born with a spleen. Patients mother is concerned due to family history nephew and brother being born without a spleen. Please call at [phone],thanks.

## 2025-05-31 DIAGNOSIS — N20.0 NEPHROLITHIASIS: ICD-10-CM

## 2025-06-02 RX ORDER — POTASSIUM CITRATE 1080 MG/1
20 TABLET, EXTENDED RELEASE ORAL 2 TIMES DAILY
Qty: 240 TABLET | Refills: 0 | Status: SHIPPED | OUTPATIENT
Start: 2025-06-02

## 2025-07-09 ENCOUNTER — TELEPHONE (OUTPATIENT)
Facility: CLINIC | Age: 45
End: 2025-07-09

## 2025-07-09 NOTE — TELEPHONE ENCOUNTER
Incoming call from Maryjane PAUL,representative from [facility].     Calling to inform patient is in calendar year plan. Patient can be seen for physical and do any labs under physical.     Maryjane PAUL.   [facility]   Reference number : [id number]  Phone number: [phone]

## 2025-08-10 ENCOUNTER — PATIENT MESSAGE (OUTPATIENT)
Dept: GASTROENTEROLOGY | Facility: CLINIC | Age: 45
End: 2025-08-10

## 2025-08-13 ENCOUNTER — OFFICE VISIT (OUTPATIENT)
Dept: GASTROENTEROLOGY | Facility: CLINIC | Age: 45
End: 2025-08-13

## 2025-08-13 ENCOUNTER — TELEPHONE (OUTPATIENT)
Dept: GASTROENTEROLOGY | Facility: CLINIC | Age: 45
End: 2025-08-13

## 2025-08-13 VITALS
SYSTOLIC BLOOD PRESSURE: 118 MMHG | DIASTOLIC BLOOD PRESSURE: 86 MMHG | HEIGHT: 61 IN | WEIGHT: 182 LBS | BODY MASS INDEX: 34.36 KG/M2

## 2025-08-13 DIAGNOSIS — R79.0 LOW FERRITIN: ICD-10-CM

## 2025-08-13 DIAGNOSIS — K59.00 CONSTIPATION, UNSPECIFIED CONSTIPATION TYPE: Primary | ICD-10-CM

## 2025-08-13 DIAGNOSIS — Z86.0100 HX OF COLONIC POLYPS: Primary | ICD-10-CM

## 2025-08-13 DIAGNOSIS — R10.9 ABDOMINAL PAIN, UNSPECIFIED ABDOMINAL LOCATION: ICD-10-CM

## 2025-08-13 PROCEDURE — 99214 OFFICE O/P EST MOD 30 MIN: CPT | Performed by: INTERNAL MEDICINE

## 2025-08-13 PROCEDURE — 3074F SYST BP LT 130 MM HG: CPT | Performed by: INTERNAL MEDICINE

## 2025-08-13 PROCEDURE — 3008F BODY MASS INDEX DOCD: CPT | Performed by: INTERNAL MEDICINE

## 2025-08-13 PROCEDURE — 3079F DIAST BP 80-89 MM HG: CPT | Performed by: INTERNAL MEDICINE

## 2025-08-13 RX ORDER — LUBIPROSTONE 8 UG/1
8 CAPSULE ORAL 2 TIMES DAILY WITH MEALS
Qty: 60 CAPSULE | Refills: 5 | Status: SHIPPED | OUTPATIENT
Start: 2025-08-13

## 2025-08-15 ENCOUNTER — TELEPHONE (OUTPATIENT)
Dept: SURGERY | Facility: CLINIC | Age: 45
End: 2025-08-15

## 2025-08-15 DIAGNOSIS — N20.0 NEPHROLITHIASIS: ICD-10-CM

## 2025-08-15 RX ORDER — POTASSIUM CITRATE 1080 MG/1
20 TABLET, EXTENDED RELEASE ORAL 2 TIMES DAILY
Qty: 240 TABLET | Refills: 2 | Status: SHIPPED | OUTPATIENT
Start: 2025-08-15

## (undated) DEVICE — SOFT TISSUE SHAVER MINI: Brand: TRUCLEAR

## (undated) DEVICE — SLEEVE KENDALL SCD EXPRESS MED

## (undated) DEVICE — UROLOGY DRAIN BAG

## (undated) DEVICE — 60 ML SYRINGE REGULAR TIP: Brand: MONOJECT

## (undated) DEVICE — STERILE WATER 1000ML BTL

## (undated) DEVICE — SOLUTION IRRIG 3000ML 0.9% NACL FLX CONT

## (undated) DEVICE — KIT ENDO ORCAPOD 160/180/190

## (undated) DEVICE — SNARE OPTMZ PLPCTM TRP

## (undated) DEVICE — CYSTO PACK: Brand: MEDLINE INDUSTRIES, INC.

## (undated) DEVICE — NITINOL STONE RETRIEVAL BASKET: Brand: ZERO TIP

## (undated) DEVICE — 3M(TM) TEGADERM(TM) TRANSPARENT FILM DRESSING FRAME STYLE 1628: Brand: 3M™ TEGADERM™

## (undated) DEVICE — YANKAUER SUCTION INSTRUMENT NO CONTROL VENT, BULB TIP, CLEAR: Brand: YANKAUER

## (undated) DEVICE — STERILE SURGICAL LUBRICANT, METAL TUBE: Brand: SURGILUBE

## (undated) DEVICE — SOLO FLEX HYBRID GUIDEWIRE .03

## (undated) DEVICE — HYSTEROSCOPY: Brand: MEDLINE INDUSTRIES, INC.

## (undated) DEVICE — 9534HP TRANSPARENT DRSG W/FRAME: Brand: 3M™ TEGADERM™

## (undated) DEVICE — FLEXOR, URETERAL ACCESS SHEATH WITH AQ, HYDROPHILIC COATING: Brand: FLEXOR

## (undated) DEVICE — TIGERTAIL 5F FLXTIP 70CM

## (undated) DEVICE — HYDROGEL COATED URETERAL DILATOR: Brand: NOTTINGHAM ONE-STEP

## (undated) DEVICE — MEDI-VAC NON-CONDUCTIVE SUCTION TUBING: Brand: CARDINAL HEALTH

## (undated) DEVICE — Device: Brand: DUAL NARE NASAL CANNULAE FEMALE LUER CON 7FT O2 TUBE

## (undated) DEVICE — ENDOSCOPIC VALVE WITH ADAPTER.: Brand: SURSEAL® II

## (undated) DEVICE — URETERAL ACCESS SHEATH SET: Brand: NAVIGATOR HD

## (undated) DEVICE — ADHESIVE MASTISOL 2/3ML

## (undated) DEVICE — GAMMEX® PI HYBRID SIZE 8, STERILE POWDER-FREE SURGICAL GLOVE, POLYISOPRENE AND NEOPRENE BLEND: Brand: GAMMEX

## (undated) DEVICE — KIT CLEAN ENDOKIT 1.1OZ GOWNX2

## (undated) DEVICE — DUAL LUMEN URETERAL CATHETER

## (undated) DEVICE — MOSES 200 FIBER

## (undated) DEVICE — GAMMEX® PI HYBRID SIZE 6.5, STERILE POWDER-FREE SURGICAL GLOVE, POLYISOPRENE AND NEOPRENE BLEND: Brand: GAMMEX

## (undated) DEVICE — SOLUTION  .9 3000ML

## (undated) DEVICE — MEDI-VAC NON-CONDUCTIVE SUCTION TUBING 6MM X 1.8M (6FT.) L: Brand: CARDINAL HEALTH

## (undated) DEVICE — SNARE CAPTI HEX STIFF SMALL

## (undated) DEVICE — FORCEP RADIAL JAW 4

## (undated) DEVICE — SNAP KOVER: Brand: UNBRANDED

## (undated) DEVICE — SOL NACL IRRIG 0.9% 1000ML BTL

## (undated) DEVICE — GAMMEX® PI HYBRID SIZE 6, STERILE POWDER-FREE SURGICAL GLOVE, POLYISOPRENE AND NEOPRENE BLEND: Brand: GAMMEX

## (undated) DEVICE — STERILE LATEX POWDER-FREE SURGICAL GLOVESWITH NITRILE COATING: Brand: PROTEXIS

## (undated) DEVICE — VIAL ISOVUE 300 10X100ML

## (undated) DEVICE — KIT HYSTEROSCOPIC PROC INCL INFLO OUTFLO TB

## (undated) DEVICE — CLIP LGT 11MM OPEN 2.8MM 235CM

## (undated) DEVICE — CANISTER SUCT 3000CC HI FLO DISP FOR FLD MGMT

## (undated) DEVICE — ELITE HYSTEROSCOPE SEAL: Brand: TRUCLEAR

## (undated) DEVICE — 3M™ STERI-STRIP™ REINFORCED ADHESIVE SKIN CLOSURES, R1547, 1/2 IN X 4 IN (12 MM X 100 MM), 6 STRIPS/ENVELOPE: Brand: 3M™ STERI-STRIP™

## (undated) DEVICE — CATH URET CONE TIP 8FR 138008

## (undated) DEVICE — CONMED SCOPE SAVER BITE BLOCK, 20X27 MM: Brand: SCOPE SAVER

## (undated) DEVICE — ZIPWIRE GUIDEWIRE 035X150 STR

## (undated) NOTE — LETTER
ASTHMA ACTION PLAN for Annabella Live     : 3/3/1980     Date: 24  Doctor:  Rita Boggs DO  Phone for doctor or clinic: [facility], [address] [address] [phone]      ACT Score: 25    ACT Goal: 20 or greater    Call your provider if you require your rescue/quick reliever medication more than 2-3 times in a 24 hour period.    If you require your rescue inhaler/medication more than 2-3 times weekly, your asthma may not be under proper control and you should seek medical attention.    *Quick Relievers are Xopenex and Albuterol*    You can use the colors of a traffic light to help learn about your asthma medicines.  Therapy Range       1. Green - Go! % of Personal Best Peak Flow   Use controller medicine.   Breathing is good  No cough or wheeze  Can work and play Medicine How much to take When to take it    Medications       Steroid Inhalants Instructions     Budesonide (PULMICORT FLEXHALER) 90 MCG/ACT Inhalation Aerosol Powder, Breath Activated Inhale 1 puff into the lungs 2 (two) times daily as needed.       Leukotriene Modulators Instructions     montelukast 10 MG Oral Tab Take 1 tablet (10 mg total) by mouth nightly.       Sympathomimetics Instructions     albuterol 108 (90 Base) MCG/ACT Inhalation Aero Soln Inhale 2 puffs into the lungs every 6 (six) hours as needed for Wheezing or Shortness of Breath.                    2. Yellow - Caution. 50-79% Personal Best Peak Flow  Use reliever medicine to keep an asthma attack from getting bad.   Cough  Quick Relievers  Wheezing  Tight Chest  Wake up at night Medicine How much to take When to take it    If symptoms are not improving in 24-48 hrs, call office for further instructions  Medications       Steroid Inhalants Instructions     Budesonide (PULMICORT FLEXHALER) 90 MCG/ACT Inhalation Aerosol Powder, Breath Activated Inhale 1 puff into the lungs 2 (two) times daily as needed.        Leukotriene Modulators Instructions     montelukast 10 MG Oral Tab Take 1 tablet (10 mg total) by mouth nightly.       Sympathomimetics Instructions     albuterol 108 (90 Base) MCG/ACT Inhalation Aero Soln Inhale 2 puffs into the lungs every 6 (six) hours as needed for Wheezing or Shortness of Breath.                    3. Red - Stop! Danger! <50% Personal Best Peak Flow  Continue Controller Medications But ADD:   Medicine not helping  Breathing is hard and fast  Nose opens wide  Can't walk  Ribs show  Can't talk well Medicine How much to take When to take it    If your symptoms do not improve in ONE hour -  go to the emergency room or call 911 immediately! If symptoms improve, call office for appointment immediately.    Albuterol inhaler 2 puffs every 20 minutes for three treatments       Don't forget:  Rinse mouth after using inhaler  Use spacer for inhaler  Remember to get your Flu vaccine every fall!    [x] Asthma Action Plan reviewed with the caregiver and patient, and a copy of the plan was given to the patient/caregiver.   [] Asthma Action Plan reviewed with the caregiver and patient on the phone, and copy mailed to patient/caregiver or sent via [employer].     Signatures:   Provider  Rita Boggs, DO Patient  Annabella Live Caretaker

## (undated) NOTE — Clinical Note
Continue care with Dr. Quintana President glucoses 4x/day & send her blood sugar log to [facility] every week for review  Monthly growth and biophysical profile ultrasound  Weekly NST at    32 wks / twice weekly at  34  wks      Hgb A1C q trimester  Insulin as above  As

## (undated) NOTE — Clinical Note
Hello, I met with Omar Higginbotham in clinic today for weight loss/management. I have recommended intensive lifestyle/behavioral modifications for weight loss. In addition, I have recommended she meet with our dietician and RTC in 3 months or as needed. Please let me know if you have any questions or concerns. Thank you very much for referring your patient to our clinic.    Take good care, GAL Hernandez

## (undated) NOTE — LETTER
[facility]  Administration of Anesthesia  1. Lakeisha Paniagua, or _________________________________ acting on her behalf, (Patient) (Dependent/Representative) request to receive anesthesia for my pending procedure/operation/treatment. A physician (anesthesiologist) alone or an anesthesiologist working with a nurse anesthetist may administer my anesthesia. 2. I understand that my anesthesiologist is not an employee or agent of the hospital, but is an independent medical practitioner who has been permitted to use its facilities for the care and treatment of his/her patients. 3. I acknowledge that a physician from [facility] or their designate(s), recommended anesthesia for me using her/his medical judgment. The type(s) of anesthesia I may receive include:                a) General Anesthesia, b) Spinal/Epidural Anesthesia, c) Regional Anesthesia or d) Monitored Anesthesia Care. 4. If my spinal, regional or monitored anesthesia care (local) is not satisfactory for my comfort, or if my medical condition requires, I consent to the administration of general anesthesia. 5. I am aware that the practice of anesthesiology is not an exact science and that some foreseeable risks or consequences may occur. Some common risks/consequences include sore throat and hoarseness, nausea and vomiting, muscle soreness, backache, damage to the mouth/teeth/vocal cords and eye injury. I understand that more rare but serious potential risks of anesthesia include blood pressure changes, drug reactions, cardiac arrest, brain damage, paralysis or death. These risks apply to whether I have general, spinal/epidural, regional or monitored anesthesia care. 6. OBSTETRIC PATIENTS: Specific risks/consequences of spinal/epidural anesthesia may include itching, low blood pressure, difficulty urinating, slowing of the baby's heart rate and headache.  Rare risks include infections, high spinal block, spinal bleeding, seizure, cardiac arrest and death. 7. AWARENESS: I understand that it is possible (but unlikely) to have explicit memory of events from the operating room while under general anesthesia. 8. ELECTROCONVULSIVE THERAPY PATIENTS: This consent serves for all treatments in a single course of therapy. 9. I understand that I must inform my anesthesiologist when I last ate and/or drank to minimize the risk of anesthesia. 10. If I am pregnant, or may pregnant, I understand that elective surgery should be postponed until after the baby is born. Anesthetics cross the placenta and may temporarily anesthetize the baby. Although fetal complications of anesthesia during pregnancy are rare, they may include birth defects, premature labor, brain damage and death. 11. I certify that I informed the anesthesiologist, to the best of my ability, about medication I take including blood thinners, anticoagulants, herbal remedies, narcotics and recreational drugs (e.g. cocaine, marijuana, PCP). Failure to inform my anesthesiologist about these medicines may increase my risk of anesthetic complications. The nature and purpose of my anesthetic management was explained to me. I had the opportunity to ask questions and the answers and information provided meet my satisfaction.   I retain the right to withdraw this consent at any time prior to the administration of said anesthetic.    ___________________________________________________           _____________________________________________________  Patient Signature                                                                                      Witness Signature                ___________________________________________________           _____________________________________________________  Date/Time                                                                                               Responsible person in case of minor/ unconscious pt /Relationship    My signature below affirms that prior to the time of the procedure, I have explained to the patient and/or his/her guardian, the risks and benefits of undergoing anesthesia, as well as any reasonable alternatives.     ___________________________________________________            _____________________________________________________  Physician Signature                            Date/Time  Patient Name: Rasheeda Moore     : 3/3/1980     Printed: 2022      Medical Record #: [id number]                              Page 1 of 1    ----------ANESTHESIA CONSENT----------

## (undated) NOTE — LETTER
03/03/25        Annabella Live  [address]    [address]  [facility] [address]      Dear Annabella,    Our records indicate that you have outstanding lab work and or testing that was ordered for you and has not yet been completed:  Supersaturation Profile Urine     To provide you with the best possible care, please complete these orders at your earliest convenience. If you have recently completed these orders please disregard this letter.     If you have any questions please call the office at [phone].    Thank you,       Urology Staff

## (undated) NOTE — LETTER
VACCINE ADMINISTRATION RECORD  PARENT / GUARDIAN APPROVAL  Date: 2021  Vaccine administered to: To Singh     : 3/3/1980    MRN: [id number]    A copy of the appropriate Centers for Disease Control and Prevention Vaccine Information state

## (undated) NOTE — LETTER
AGREEMENT FOR TREATMENT WITH Rh IMMUNE GLOBULIN      To:    Nurse (provider) and [address] [place] [address]     Date: November 18, 2021   Time: 1:28 PM  I authorize the administration of Rh Immune Globulin for    Renell Kidney (my

## (undated) NOTE — LETTER
AUTHORIZATION FOR SURGICAL OPERATION OR OTHER PROCEDURE    1. I hereby authorize Dr. Matilde Miller, and [address] [place] [address] staff assigned to my case to perform the following operation and/or procedure at the [address] [place] [address]:    _______________________________________________________________________________________________    Vyas Geovanna  _______________________________________________________________________________________________    2. My physician has explained the nature and purpose of the operation or other procedure, possible alternative methods of treatment, the risks involved, and the possibility of complication to me. I acknowledge that no guarantee has been made as to the result that may be obtained. 3.  I recognize that, during the course of this operation, or other procedure, unforseen conditions may necessitate additional or different procedure than those listed above. I, therefore, further authorize and request that the above named physician, his/her physician assistants or designees perform such procedures as are, in his/her professional opinion, necessary and desirable. 4.  Any tissue or organs removed in the operation or other procedure may be disposed of by and at the discretion of the [address] [place] [address] and [facility]. 5.  I understand that in the event of a medical emergency, I will be transported by local paramedics to [facility] or other hospital emergency department. 6.  I certify that I have read and fully understand the above consent to operation and/or other procedure. 7.  I acknowledge that my physician has explained sedation/analgesia administration to me including the risks and benefits. I consent to the administration of sedation/analgesia as may be necessary or desirable in the judgement of my physician. Witness signature: ___________________________________________________ Date:  ______/______/_____                    Time:  ________ A. M.  P.M. Patient Name:  ______________________________________________________  (please print)      Patient signature:  ___________________________________________________             Relationship to Patient:           []  Parent    Responsible person                          []  Spouse  In case of minor or                    [] Other  _____________   Incompetent name:  __________________________________________________                               (please print)      _____________      Responsible person  In case of minor or  Incompetent signature:  _______________________________________________    Statement of Physician  My signature below affirms that prior to the time of the procedure, I have explained to the patient and/or his/her guardian, the risks and benefits involved in the proposed treatment and any reasonable alternative to the proposed treatment. I have also explained the risks and benefits involved in the refusal of the proposed treatment and have answered the patient's questions.                         Date:  ______/______/_______  Provider                      Signature:  __________________________________________________________       Time:  ___________ A.M    P.M.

## (undated) NOTE — LETTER
AUTHORIZATION FOR SURGICAL OPERATION OR OTHER PROCEDURE    1. I hereby authorize Dr. Radha Win, and [employer] staff assigned to my case to perform the following operation and/or procedure at the [facility]:    Endometrial Biopsy    2. My physician has explained the nature and purpose of the operation or other procedure, possible alternative methods of treatment, the risks involved, and the possibility of complication to me. I acknowledge that no guarantee has been made as to the result that may be obtained. 3.  I recognize that, during the course of this operation, or other procedure, unforseen conditions may necessitate additional or different procedure than those listed above. I, therefore, further authorize and request that the above named physician, his/her physician assistants or designees perform such procedures as are, in his/her professional opinion, necessary and desirable. 4.  Any tissue or organs removed in the operation or other procedure may be disposed of by and at the discretion of the [facility] and [facility]. 5.  I understand that in the event of a medical emergency, I will be transported by local paramedics to [facility] or other hospital emergency department. 6.  I certify that I have read and fully understand the above consent to operation and/or other procedure. 7.  I acknowledge that my physician has explained sedation/analgesia administration to me including the risks and benefits. I consent to the administration of sedation/analgesia as may be necessary or desirable in the judgement of my physician. Witness signature: ___________________________________________________ Date:  ______/______/_____                    Time:  ________ A. M.  P.M.        Patient Name:  ______________________________________________________  (please print)      Patient signature: ___________________________________________________             Relationship to Patient:           []  Parent    Responsible person                          []  Spouse  In case of minor or                    [] Other  _____________   Incompetent name:  __________________________________________________                               (please print)      _____________      Responsible person  In case of minor or  Incompetent signature:  _______________________________________________    Statement of Physician  My signature below affirms that prior to the time of the procedure, I have explained to the patient and/or his/her guardian, the risks and benefits involved in the proposed treatment and any reasonable alternative to the proposed treatment. I have also explained the risks and benefits involved in the refusal of the proposed treatment and have answered the patient's questions.                         Date:  ______/______/_______  Provider                      Signature:  __________________________________________________________       Time:  ___________ A.M    P.M.

## (undated) NOTE — LETTER
10/11/2022  [address]  [address] [facility] [address]    We take each of our patient's health very seriously and the key to maintaining your health is an annual wellness physical.  Review of your medical records shows that it is time for your mammogram.  Please contact central scheduling at your earliest convenience to schedule this appointment at [phone].   Thank Deanna Saldivar MD

## (undated) NOTE — LETTER
Patient Name: Cristian Hirsch  : 3/3/1980  MRN: [id number]  Patient Address:  [facility]   [address] [place] [place] [address]      COVID-19 [address] [address] is committed to the safety and well-being of our [facility] monoclonal antibody therapy. These treatments, when available and appropriate, should be given as soon as possible after diagnosis.       Seek Further Care      If you are awaiting test results or are confirmed positive for COVID-19, and your symptoms worse counters, tabletops, and doorknobs. Use household cleaning sprays or wipes according to the label instructions.          Post-Discharge Follow-up  Please call your primary care provider within two days of your discharge to arrange for a telehealth follow-up [email or web address]. pdf  Centers for Disease Control & Prevention (CDC)  10 things you can do to manage your health at home, [email or web address]. pdf  ht

## (undated) NOTE — LETTER
If You Are Rh Negative – Routine Administration    If you’re Rh negative, ask your health care provider about getting treated with RhoGam or Rhophylac. Even if you miscarry or don’t deliver the baby, you will still need treatment.  The health of any baby yo directed      Location, date and time:  11/18/21